# Patient Record
Sex: MALE | Race: WHITE | NOT HISPANIC OR LATINO | Employment: UNEMPLOYED | URBAN - METROPOLITAN AREA
[De-identification: names, ages, dates, MRNs, and addresses within clinical notes are randomized per-mention and may not be internally consistent; named-entity substitution may affect disease eponyms.]

---

## 2017-01-09 ENCOUNTER — RECORDS - HEALTHEAST (OUTPATIENT)
Dept: LAB | Facility: HOSPITAL | Age: 52
End: 2017-01-09

## 2017-01-09 ENCOUNTER — RECORDS - HEALTHEAST (OUTPATIENT)
Dept: ADMINISTRATIVE | Facility: OTHER | Age: 52
End: 2017-01-09

## 2017-01-16 LAB
ACHR BIND IGG+IGM SER IA-SCNC: 1.13 NMOL/L
ACHR MOD AB/ACHR TOTAL SFR SER: 82 %
IMMUNOLOGIST REVIEW: ABNORMAL
STRIA MUS AB TITR SER: ABNORMAL TITER

## 2017-01-28 ENCOUNTER — COMMUNICATION - HEALTHEAST (OUTPATIENT)
Dept: FAMILY MEDICINE | Facility: CLINIC | Age: 52
End: 2017-01-28

## 2017-01-28 DIAGNOSIS — E78.5 HYPERLIPIDEMIA: ICD-10-CM

## 2017-04-20 ENCOUNTER — COMMUNICATION - HEALTHEAST (OUTPATIENT)
Dept: FAMILY MEDICINE | Facility: CLINIC | Age: 52
End: 2017-04-20

## 2017-04-20 DIAGNOSIS — E11.9 TYPE 2 DIABETES MELLITUS (H): ICD-10-CM

## 2017-07-21 ENCOUNTER — COMMUNICATION - HEALTHEAST (OUTPATIENT)
Dept: FAMILY MEDICINE | Facility: CLINIC | Age: 52
End: 2017-07-21

## 2017-07-21 DIAGNOSIS — E78.5 HYPERLIPIDEMIA: ICD-10-CM

## 2017-07-22 ENCOUNTER — COMMUNICATION - HEALTHEAST (OUTPATIENT)
Dept: FAMILY MEDICINE | Facility: CLINIC | Age: 52
End: 2017-07-22

## 2017-07-22 DIAGNOSIS — E11.9 TYPE 2 DIABETES MELLITUS (H): ICD-10-CM

## 2017-08-19 ENCOUNTER — COMMUNICATION - HEALTHEAST (OUTPATIENT)
Dept: FAMILY MEDICINE | Facility: CLINIC | Age: 52
End: 2017-08-19

## 2017-08-19 DIAGNOSIS — E11.9 DIABETES (H): ICD-10-CM

## 2017-08-20 ENCOUNTER — COMMUNICATION - HEALTHEAST (OUTPATIENT)
Dept: FAMILY MEDICINE | Facility: CLINIC | Age: 52
End: 2017-08-20

## 2017-08-20 DIAGNOSIS — E11.9 TYPE 2 DIABETES MELLITUS (H): ICD-10-CM

## 2017-08-28 ENCOUNTER — RECORDS - HEALTHEAST (OUTPATIENT)
Dept: ADMINISTRATIVE | Facility: OTHER | Age: 52
End: 2017-08-28

## 2017-08-28 ENCOUNTER — OFFICE VISIT - HEALTHEAST (OUTPATIENT)
Dept: FAMILY MEDICINE | Facility: CLINIC | Age: 52
End: 2017-08-28

## 2017-08-28 DIAGNOSIS — E78.5 HYPERLIPIDEMIA: ICD-10-CM

## 2017-08-28 DIAGNOSIS — Z12.11 COLON CANCER SCREENING: ICD-10-CM

## 2017-08-28 DIAGNOSIS — E11.9 TYPE 2 DIABETES MELLITUS (H): ICD-10-CM

## 2017-08-28 DIAGNOSIS — G70.00 MYASTHENIA GRAVIS (H): ICD-10-CM

## 2017-08-28 LAB — HBA1C MFR BLD: 6.6 % (ref 3.5–6)

## 2017-08-28 ASSESSMENT — MIFFLIN-ST. JEOR: SCORE: 1885.19

## 2017-09-20 ENCOUNTER — COMMUNICATION - HEALTHEAST (OUTPATIENT)
Dept: FAMILY MEDICINE | Facility: CLINIC | Age: 52
End: 2017-09-20

## 2017-09-20 DIAGNOSIS — E11.9 DIABETES (H): ICD-10-CM

## 2017-10-18 ENCOUNTER — COMMUNICATION - HEALTHEAST (OUTPATIENT)
Dept: FAMILY MEDICINE | Facility: CLINIC | Age: 52
End: 2017-10-18

## 2017-10-18 DIAGNOSIS — E78.5 HYPERLIPIDEMIA: ICD-10-CM

## 2017-12-21 ENCOUNTER — COMMUNICATION - HEALTHEAST (OUTPATIENT)
Dept: FAMILY MEDICINE | Facility: CLINIC | Age: 52
End: 2017-12-21

## 2017-12-21 DIAGNOSIS — E11.9 TYPE 2 DIABETES MELLITUS (H): ICD-10-CM

## 2017-12-28 ENCOUNTER — COMMUNICATION - HEALTHEAST (OUTPATIENT)
Dept: FAMILY MEDICINE | Facility: CLINIC | Age: 52
End: 2017-12-28

## 2017-12-29 ENCOUNTER — COMMUNICATION - HEALTHEAST (OUTPATIENT)
Dept: FAMILY MEDICINE | Facility: CLINIC | Age: 52
End: 2017-12-29

## 2017-12-29 DIAGNOSIS — E11.9 TYPE 2 DIABETES MELLITUS (H): ICD-10-CM

## 2018-01-24 ENCOUNTER — COMMUNICATION - HEALTHEAST (OUTPATIENT)
Dept: FAMILY MEDICINE | Facility: CLINIC | Age: 53
End: 2018-01-24

## 2018-01-24 DIAGNOSIS — E11.9 TYPE 2 DIABETES MELLITUS (H): ICD-10-CM

## 2018-02-16 ENCOUNTER — COMMUNICATION - HEALTHEAST (OUTPATIENT)
Dept: FAMILY MEDICINE | Facility: CLINIC | Age: 53
End: 2018-02-16

## 2018-02-16 DIAGNOSIS — E11.9 DIABETES (H): ICD-10-CM

## 2018-03-05 ENCOUNTER — COMMUNICATION - HEALTHEAST (OUTPATIENT)
Dept: FAMILY MEDICINE | Facility: CLINIC | Age: 53
End: 2018-03-05

## 2018-03-05 ENCOUNTER — OFFICE VISIT - HEALTHEAST (OUTPATIENT)
Dept: FAMILY MEDICINE | Facility: CLINIC | Age: 53
End: 2018-03-05

## 2018-03-05 DIAGNOSIS — E11.9 TYPE 2 DIABETES MELLITUS (H): ICD-10-CM

## 2018-03-05 DIAGNOSIS — E78.00 PURE HYPERCHOLESTEROLEMIA: ICD-10-CM

## 2018-03-05 DIAGNOSIS — Z00.00 HEALTH CARE MAINTENANCE: ICD-10-CM

## 2018-03-05 DIAGNOSIS — G70.00 MYASTHENIA GRAVIS (H): ICD-10-CM

## 2018-03-05 LAB
ALT SERPL W P-5'-P-CCNC: 43 U/L (ref 0–45)
HBA1C MFR BLD: 6.5 % (ref 3.5–6)
LDLC SERPL CALC-MCNC: 34 MG/DL

## 2018-05-29 ENCOUNTER — RECORDS - HEALTHEAST (OUTPATIENT)
Dept: LAB | Facility: HOSPITAL | Age: 53
End: 2018-05-29

## 2018-05-29 LAB
ALBUMIN SERPL-MCNC: 3.6 G/DL (ref 3.5–5)
ALP SERPL-CCNC: 100 U/L (ref 45–120)
ALT SERPL W P-5'-P-CCNC: 46 U/L (ref 0–45)
ANION GAP SERPL CALCULATED.3IONS-SCNC: 9 MMOL/L (ref 5–18)
AST SERPL W P-5'-P-CCNC: 45 U/L (ref 0–40)
BASOPHILS # BLD AUTO: 0 THOU/UL (ref 0–0.2)
BASOPHILS NFR BLD AUTO: 0 % (ref 0–2)
BILIRUB SERPL-MCNC: 1.6 MG/DL (ref 0–1)
BUN SERPL-MCNC: 11 MG/DL (ref 8–22)
CALCIUM SERPL-MCNC: 9.3 MG/DL (ref 8.5–10.5)
CHLORIDE BLD-SCNC: 107 MMOL/L (ref 98–107)
CO2 SERPL-SCNC: 24 MMOL/L (ref 22–31)
CREAT SERPL-MCNC: 0.62 MG/DL (ref 0.7–1.3)
EOSINOPHIL # BLD AUTO: 0.1 THOU/UL (ref 0–0.4)
EOSINOPHIL NFR BLD AUTO: 2 % (ref 0–6)
ERYTHROCYTE [DISTWIDTH] IN BLOOD BY AUTOMATED COUNT: 13.7 % (ref 11–14.5)
GFR SERPL CREATININE-BSD FRML MDRD: >60 ML/MIN/1.73M2
GLUCOSE BLD-MCNC: 180 MG/DL (ref 70–125)
HCT VFR BLD AUTO: 38 % (ref 40–54)
HGB BLD-MCNC: 13.9 G/DL (ref 14–18)
LYMPHOCYTES # BLD AUTO: 0.6 THOU/UL (ref 0.8–4.4)
LYMPHOCYTES NFR BLD AUTO: 10 % (ref 20–40)
MCH RBC QN AUTO: 36 PG (ref 27–34)
MCHC RBC AUTO-ENTMCNC: 36.6 G/DL (ref 32–36)
MCV RBC AUTO: 98 FL (ref 80–100)
MONOCYTES # BLD AUTO: 0.2 THOU/UL (ref 0–0.9)
MONOCYTES NFR BLD AUTO: 4 % (ref 2–10)
NEUTROPHILS # BLD AUTO: 4.5 THOU/UL (ref 2–7.7)
NEUTROPHILS NFR BLD AUTO: 84 % (ref 50–70)
PLATELET # BLD AUTO: 173 THOU/UL (ref 140–440)
PMV BLD AUTO: 9.6 FL (ref 8.5–12.5)
POTASSIUM BLD-SCNC: 4.2 MMOL/L (ref 3.5–5)
PROT SERPL-MCNC: 7.2 G/DL (ref 6–8)
RBC # BLD AUTO: 3.86 MILL/UL (ref 4.4–6.2)
SODIUM SERPL-SCNC: 140 MMOL/L (ref 136–145)
WBC: 5.4 THOU/UL (ref 4–11)

## 2018-07-18 ENCOUNTER — COMMUNICATION - HEALTHEAST (OUTPATIENT)
Dept: FAMILY MEDICINE | Facility: CLINIC | Age: 53
End: 2018-07-18

## 2018-07-18 DIAGNOSIS — E78.5 HYPERLIPIDEMIA: ICD-10-CM

## 2018-11-27 ENCOUNTER — COMMUNICATION - HEALTHEAST (OUTPATIENT)
Dept: FAMILY MEDICINE | Facility: CLINIC | Age: 53
End: 2018-11-27

## 2018-11-27 ENCOUNTER — OFFICE VISIT - HEALTHEAST (OUTPATIENT)
Dept: FAMILY MEDICINE | Facility: CLINIC | Age: 53
End: 2018-11-27

## 2018-11-27 DIAGNOSIS — E66.811 CLASS 1 OBESITY WITHOUT SERIOUS COMORBIDITY IN ADULT, UNSPECIFIED BMI, UNSPECIFIED OBESITY TYPE: ICD-10-CM

## 2018-11-27 DIAGNOSIS — E11.9 TYPE 2 DIABETES MELLITUS WITHOUT COMPLICATION, WITHOUT LONG-TERM CURRENT USE OF INSULIN (H): ICD-10-CM

## 2018-11-27 DIAGNOSIS — Z00.00 HEALTHCARE MAINTENANCE: ICD-10-CM

## 2018-11-27 DIAGNOSIS — R21 RASH AND NONSPECIFIC SKIN ERUPTION: ICD-10-CM

## 2018-11-27 DIAGNOSIS — G70.00 MYASTHENIA GRAVIS (H): ICD-10-CM

## 2018-11-27 DIAGNOSIS — Z12.11 COLON CANCER SCREENING: ICD-10-CM

## 2018-11-27 DIAGNOSIS — E78.00 PURE HYPERCHOLESTEROLEMIA: ICD-10-CM

## 2018-11-27 LAB
CREAT UR-MCNC: 233.6 MG/DL
HBA1C MFR BLD: 5.6 % (ref 3.5–6)
MICROALBUMIN UR-MCNC: 1.24 MG/DL (ref 0–1.99)
MICROALBUMIN/CREAT UR: 5.3 MG/G

## 2018-12-17 ENCOUNTER — RECORDS - HEALTHEAST (OUTPATIENT)
Dept: LAB | Facility: HOSPITAL | Age: 53
End: 2018-12-17

## 2018-12-17 LAB
ALBUMIN SERPL-MCNC: 3.6 G/DL (ref 3.5–5)
ALP SERPL-CCNC: 96 U/L (ref 45–120)
ALT SERPL W P-5'-P-CCNC: 55 U/L (ref 0–45)
ANION GAP SERPL CALCULATED.3IONS-SCNC: 5 MMOL/L (ref 5–18)
AST SERPL W P-5'-P-CCNC: 52 U/L (ref 0–40)
BASOPHILS # BLD AUTO: 0 THOU/UL (ref 0–0.2)
BASOPHILS NFR BLD AUTO: 1 % (ref 0–2)
BILIRUB SERPL-MCNC: 0.9 MG/DL (ref 0–1)
BUN SERPL-MCNC: 13 MG/DL (ref 8–22)
CALCIUM SERPL-MCNC: 9.1 MG/DL (ref 8.5–10.5)
CHLORIDE BLD-SCNC: 107 MMOL/L (ref 98–107)
CO2 SERPL-SCNC: 26 MMOL/L (ref 22–31)
CREAT SERPL-MCNC: 0.73 MG/DL (ref 0.7–1.3)
EOSINOPHIL # BLD AUTO: 0.1 THOU/UL (ref 0–0.4)
EOSINOPHIL NFR BLD AUTO: 3 % (ref 0–6)
ERYTHROCYTE [DISTWIDTH] IN BLOOD BY AUTOMATED COUNT: 13.7 % (ref 11–14.5)
GFR SERPL CREATININE-BSD FRML MDRD: >60 ML/MIN/1.73M2
GLUCOSE BLD-MCNC: 174 MG/DL (ref 70–125)
HCT VFR BLD AUTO: 37.5 % (ref 40–54)
HGB BLD-MCNC: 13.5 G/DL (ref 14–18)
LYMPHOCYTES # BLD AUTO: 0.7 THOU/UL (ref 0.8–4.4)
LYMPHOCYTES NFR BLD AUTO: 16 % (ref 20–40)
MCH RBC QN AUTO: 35.2 PG (ref 27–34)
MCHC RBC AUTO-ENTMCNC: 36 G/DL (ref 32–36)
MCV RBC AUTO: 98 FL (ref 80–100)
MONOCYTES # BLD AUTO: 0.3 THOU/UL (ref 0–0.9)
MONOCYTES NFR BLD AUTO: 8 % (ref 2–10)
NEUTROPHILS # BLD AUTO: 2.9 THOU/UL (ref 2–7.7)
NEUTROPHILS NFR BLD AUTO: 72 % (ref 50–70)
PLATELET # BLD AUTO: 173 THOU/UL (ref 140–440)
PMV BLD AUTO: 9 FL (ref 8.5–12.5)
POTASSIUM BLD-SCNC: 4.3 MMOL/L (ref 3.5–5)
PROT SERPL-MCNC: 7.1 G/DL (ref 6–8)
RBC # BLD AUTO: 3.83 MILL/UL (ref 4.4–6.2)
SODIUM SERPL-SCNC: 138 MMOL/L (ref 136–145)
WBC: 4 THOU/UL (ref 4–11)

## 2019-01-02 ENCOUNTER — COMMUNICATION - HEALTHEAST (OUTPATIENT)
Dept: FAMILY MEDICINE | Facility: CLINIC | Age: 54
End: 2019-01-02

## 2019-01-07 ENCOUNTER — OFFICE VISIT - HEALTHEAST (OUTPATIENT)
Dept: FAMILY MEDICINE | Facility: CLINIC | Age: 54
End: 2019-01-07

## 2019-01-07 DIAGNOSIS — R20.2 NUMBNESS AND TINGLING: ICD-10-CM

## 2019-01-07 DIAGNOSIS — R20.0 NUMBNESS AND TINGLING: ICD-10-CM

## 2019-01-07 LAB
ALBUMIN SERPL-MCNC: 3.8 G/DL (ref 3.5–5)
ALP SERPL-CCNC: 99 U/L (ref 45–120)
ALT SERPL W P-5'-P-CCNC: 36 U/L (ref 0–45)
ANION GAP SERPL CALCULATED.3IONS-SCNC: 10 MMOL/L (ref 5–18)
AST SERPL W P-5'-P-CCNC: 35 U/L (ref 0–40)
BASOPHILS # BLD AUTO: 0 THOU/UL (ref 0–0.2)
BASOPHILS NFR BLD AUTO: 0 % (ref 0–2)
BILIRUB SERPL-MCNC: 0.9 MG/DL (ref 0–1)
BUN SERPL-MCNC: 19 MG/DL (ref 8–22)
CALCIUM SERPL-MCNC: 9.1 MG/DL (ref 8.5–10.5)
CHLORIDE BLD-SCNC: 105 MMOL/L (ref 98–107)
CK SERPL-CCNC: 101 U/L (ref 30–190)
CO2 SERPL-SCNC: 25 MMOL/L (ref 22–31)
CREAT SERPL-MCNC: 0.69 MG/DL (ref 0.7–1.3)
EOSINOPHIL # BLD AUTO: 0.1 THOU/UL (ref 0–0.4)
EOSINOPHIL NFR BLD AUTO: 2 % (ref 0–6)
ERYTHROCYTE [DISTWIDTH] IN BLOOD BY AUTOMATED COUNT: 12.2 % (ref 11–14.5)
FOLATE SERPL-MCNC: 6.5 NG/ML
GFR SERPL CREATININE-BSD FRML MDRD: >60 ML/MIN/1.73M2
GLUCOSE BLD-MCNC: 147 MG/DL (ref 70–125)
HCT VFR BLD AUTO: 40.6 % (ref 40–54)
HGB BLD-MCNC: 13.8 G/DL (ref 14–18)
LYMPHOCYTES # BLD AUTO: 0.5 THOU/UL (ref 0.8–4.4)
LYMPHOCYTES NFR BLD AUTO: 10 % (ref 20–40)
MCH RBC QN AUTO: 34.9 PG (ref 27–34)
MCHC RBC AUTO-ENTMCNC: 34 G/DL (ref 32–36)
MCV RBC AUTO: 102 FL (ref 80–100)
MONOCYTES # BLD AUTO: 0.2 THOU/UL (ref 0–0.9)
MONOCYTES NFR BLD AUTO: 5 % (ref 2–10)
NEUTROPHILS # BLD AUTO: 4 THOU/UL (ref 2–7.7)
NEUTROPHILS NFR BLD AUTO: 83 % (ref 50–70)
PLATELET # BLD AUTO: 150 THOU/UL (ref 140–440)
PMV BLD AUTO: 6.8 FL (ref 7–10)
POTASSIUM BLD-SCNC: 4.4 MMOL/L (ref 3.5–5)
PROT SERPL-MCNC: 7.1 G/DL (ref 6–8)
RBC # BLD AUTO: 3.96 MILL/UL (ref 4.4–6.2)
SODIUM SERPL-SCNC: 140 MMOL/L (ref 136–145)
TSH SERPL DL<=0.005 MIU/L-ACNC: 2.7 UIU/ML (ref 0.3–5)
VIT B12 SERPL-MCNC: 369 PG/ML (ref 213–816)
WBC: 4.9 THOU/UL (ref 4–11)

## 2019-01-07 ASSESSMENT — MIFFLIN-ST. JEOR: SCORE: 1805.35

## 2019-01-17 ENCOUNTER — COMMUNICATION - HEALTHEAST (OUTPATIENT)
Dept: FAMILY MEDICINE | Facility: CLINIC | Age: 54
End: 2019-01-17

## 2019-01-28 ENCOUNTER — HOSPITAL ENCOUNTER (OUTPATIENT)
Dept: MRI IMAGING | Facility: HOSPITAL | Age: 54
Discharge: HOME | End: 2019-01-28
Attending: FAMILY MEDICINE

## 2019-01-28 DIAGNOSIS — R20.0 NUMBNESS AND TINGLING: ICD-10-CM

## 2019-01-28 DIAGNOSIS — R20.2 NUMBNESS AND TINGLING: ICD-10-CM

## 2019-03-08 ENCOUNTER — COMMUNICATION - HEALTHEAST (OUTPATIENT)
Dept: FAMILY MEDICINE | Facility: CLINIC | Age: 54
End: 2019-03-08

## 2019-03-08 DIAGNOSIS — E11.9 TYPE 2 DIABETES MELLITUS (H): ICD-10-CM

## 2019-05-23 ENCOUNTER — COMMUNICATION - HEALTHEAST (OUTPATIENT)
Dept: FAMILY MEDICINE | Facility: CLINIC | Age: 54
End: 2019-05-23

## 2019-05-23 DIAGNOSIS — E11.9 TYPE 2 DIABETES MELLITUS (H): ICD-10-CM

## 2019-06-26 ENCOUNTER — COMMUNICATION - HEALTHEAST (OUTPATIENT)
Dept: FAMILY MEDICINE | Facility: CLINIC | Age: 54
End: 2019-06-26

## 2019-06-26 DIAGNOSIS — E78.00 PURE HYPERCHOLESTEROLEMIA: ICD-10-CM

## 2019-11-05 ENCOUNTER — OFFICE VISIT - HEALTHEAST (OUTPATIENT)
Dept: FAMILY MEDICINE | Facility: CLINIC | Age: 54
End: 2019-11-05

## 2019-11-05 DIAGNOSIS — G70.00 MYASTHENIA GRAVIS (H): ICD-10-CM

## 2019-11-05 DIAGNOSIS — R59.1 LYMPHADENOPATHY: ICD-10-CM

## 2019-11-05 DIAGNOSIS — E11.9 TYPE 2 DIABETES MELLITUS WITHOUT COMPLICATION, WITHOUT LONG-TERM CURRENT USE OF INSULIN (H): ICD-10-CM

## 2019-11-05 LAB
ALT SERPL W P-5'-P-CCNC: 39 U/L (ref 0–45)
ANION GAP SERPL CALCULATED.3IONS-SCNC: 5 MMOL/L (ref 5–18)
BASOPHILS # BLD AUTO: 0 THOU/UL (ref 0–0.2)
BASOPHILS NFR BLD AUTO: 0 % (ref 0–2)
BUN SERPL-MCNC: 15 MG/DL (ref 8–22)
CALCIUM SERPL-MCNC: 9.2 MG/DL (ref 8.5–10.5)
CHLORIDE BLD-SCNC: 108 MMOL/L (ref 98–107)
CO2 SERPL-SCNC: 29 MMOL/L (ref 22–31)
CREAT SERPL-MCNC: 0.68 MG/DL (ref 0.7–1.3)
CREAT UR-MCNC: 174.2 MG/DL
EOSINOPHIL # BLD AUTO: 0.1 THOU/UL (ref 0–0.4)
EOSINOPHIL NFR BLD AUTO: 2 % (ref 0–6)
ERYTHROCYTE [DISTWIDTH] IN BLOOD BY AUTOMATED COUNT: 15.4 % (ref 11–14.5)
GFR SERPL CREATININE-BSD FRML MDRD: >60 ML/MIN/1.73M2
GLUCOSE BLD-MCNC: 146 MG/DL (ref 70–125)
HBA1C MFR BLD: 6.1 % (ref 3.5–6)
HCT VFR BLD AUTO: 36.5 % (ref 40–54)
HGB BLD-MCNC: 12.5 G/DL (ref 14–18)
LDLC SERPL CALC-MCNC: 31 MG/DL
LYMPHOCYTES # BLD AUTO: 0.4 THOU/UL (ref 0.8–4.4)
LYMPHOCYTES NFR BLD AUTO: 10 % (ref 20–40)
MCH RBC QN AUTO: 36.9 PG (ref 27–34)
MCHC RBC AUTO-ENTMCNC: 34.2 G/DL (ref 32–36)
MCV RBC AUTO: 108 FL (ref 80–100)
MICROALBUMIN UR-MCNC: 0.8 MG/DL (ref 0–1.99)
MICROALBUMIN/CREAT UR: 4.6 MG/G
MONOCYTES # BLD AUTO: 0.2 THOU/UL (ref 0–0.9)
MONOCYTES NFR BLD AUTO: 4 % (ref 2–10)
NEUTROPHILS # BLD AUTO: 3.3 THOU/UL (ref 2–7.7)
NEUTROPHILS NFR BLD AUTO: 84 % (ref 50–70)
PLATELET # BLD AUTO: 140 THOU/UL (ref 140–440)
PMV BLD AUTO: 10 FL (ref 8.5–12.5)
POTASSIUM BLD-SCNC: 4.5 MMOL/L (ref 3.5–5)
RBC # BLD AUTO: 3.39 MILL/UL (ref 4.4–6.2)
SODIUM SERPL-SCNC: 142 MMOL/L (ref 136–145)
WBC: 3.9 THOU/UL (ref 4–11)

## 2019-11-05 ASSESSMENT — MIFFLIN-ST. JEOR: SCORE: 1783.81

## 2019-11-07 ENCOUNTER — COMMUNICATION - HEALTHEAST (OUTPATIENT)
Dept: FAMILY MEDICINE | Facility: CLINIC | Age: 54
End: 2019-11-07

## 2019-11-07 DIAGNOSIS — E11.9 TYPE 2 DIABETES MELLITUS (H): ICD-10-CM

## 2019-11-11 ENCOUNTER — HOSPITAL ENCOUNTER (OUTPATIENT)
Dept: CT IMAGING | Facility: HOSPITAL | Age: 54
Discharge: HOME | End: 2019-11-11
Attending: FAMILY MEDICINE

## 2019-11-11 DIAGNOSIS — R59.1 LYMPHADENOPATHY: ICD-10-CM

## 2019-11-12 ENCOUNTER — RECORDS - HEALTHEAST (OUTPATIENT)
Dept: ADMINISTRATIVE | Facility: OTHER | Age: 54
End: 2019-11-12

## 2019-11-21 ENCOUNTER — COMMUNICATION - HEALTHEAST (OUTPATIENT)
Dept: FAMILY MEDICINE | Facility: CLINIC | Age: 54
End: 2019-11-21

## 2019-11-22 ENCOUNTER — COMMUNICATION - HEALTHEAST (OUTPATIENT)
Dept: SCHEDULING | Facility: CLINIC | Age: 54
End: 2019-11-22

## 2019-11-22 ENCOUNTER — RECORDS - HEALTHEAST (OUTPATIENT)
Dept: ADMINISTRATIVE | Facility: OTHER | Age: 54
End: 2019-11-22

## 2019-11-26 ENCOUNTER — OFFICE VISIT - HEALTHEAST (OUTPATIENT)
Dept: FAMILY MEDICINE | Facility: CLINIC | Age: 54
End: 2019-11-26

## 2019-11-26 DIAGNOSIS — C14.0 MALIGNANT NEOPLASM OF PHARYNX (H): ICD-10-CM

## 2019-11-26 DIAGNOSIS — Z01.818 PREOP EXAMINATION: ICD-10-CM

## 2019-11-26 LAB
ATRIAL RATE - MUSE: 70 BPM
DIASTOLIC BLOOD PRESSURE - MUSE: NORMAL
INTERPRETATION ECG - MUSE: NORMAL
P AXIS - MUSE: 43 DEGREES
PR INTERVAL - MUSE: 152 MS
QRS DURATION - MUSE: 132 MS
QT - MUSE: 442 MS
QTC - MUSE: 477 MS
R AXIS - MUSE: -30 DEGREES
SYSTOLIC BLOOD PRESSURE - MUSE: NORMAL
T AXIS - MUSE: -2 DEGREES
VENTRICULAR RATE- MUSE: 70 BPM

## 2019-11-26 ASSESSMENT — MIFFLIN-ST. JEOR: SCORE: 1756.59

## 2019-11-27 ENCOUNTER — COMMUNICATION - HEALTHEAST (OUTPATIENT)
Dept: FAMILY MEDICINE | Facility: CLINIC | Age: 54
End: 2019-11-27

## 2019-11-27 DIAGNOSIS — E11.9 TYPE 2 DIABETES MELLITUS (H): ICD-10-CM

## 2019-12-02 ENCOUNTER — RECORDS - HEALTHEAST (OUTPATIENT)
Dept: ADMINISTRATIVE | Facility: OTHER | Age: 54
End: 2019-12-02

## 2019-12-02 LAB — RETINOPATHY: NEGATIVE

## 2019-12-04 ENCOUNTER — RECORDS - HEALTHEAST (OUTPATIENT)
Dept: HEALTH INFORMATION MANAGEMENT | Facility: CLINIC | Age: 54
End: 2019-12-04

## 2019-12-05 ENCOUNTER — HOSPITAL ENCOUNTER (OUTPATIENT)
Dept: PET IMAGING | Facility: HOSPITAL | Age: 54
Discharge: HOME | End: 2019-12-05
Attending: OTOLARYNGOLOGY

## 2019-12-05 DIAGNOSIS — E11.9 TYPE 2 DIABETES MELLITUS WITHOUT COMPLICATIONS (H): ICD-10-CM

## 2019-12-05 DIAGNOSIS — R22.1 LOCALIZED SWELLING, MASS AND LUMP, NECK: ICD-10-CM

## 2019-12-05 DIAGNOSIS — C77.0 SECONDARY AND UNSPECIFIED MALIGNANT NEOPLASM OF LYMPH NODES OF HEAD, FACE AND NECK (H): ICD-10-CM

## 2019-12-05 DIAGNOSIS — E66.09 OTHER OBESITY DUE TO EXCESS CALORIES: ICD-10-CM

## 2019-12-05 DIAGNOSIS — G70.00 MYASTHENIA GRAVIS (H): ICD-10-CM

## 2019-12-05 LAB — GLUCOSE BLDC GLUCOMTR-MCNC: 147 MG/DL (ref 70–139)

## 2019-12-10 ENCOUNTER — RECORDS - HEALTHEAST (OUTPATIENT)
Dept: ADMINISTRATIVE | Facility: OTHER | Age: 54
End: 2019-12-10

## 2019-12-11 ENCOUNTER — RECORDS - HEALTHEAST (OUTPATIENT)
Dept: ADMINISTRATIVE | Facility: OTHER | Age: 54
End: 2019-12-11

## 2019-12-13 ENCOUNTER — RECORDS - HEALTHEAST (OUTPATIENT)
Dept: ADMINISTRATIVE | Facility: OTHER | Age: 54
End: 2019-12-13

## 2019-12-23 ENCOUNTER — HOSPITAL ENCOUNTER (OUTPATIENT)
Dept: INTERVENTIONAL RADIOLOGY/VASCULAR | Facility: HOSPITAL | Age: 54
Discharge: HOME | End: 2019-12-23
Attending: INTERNAL MEDICINE | Admitting: RADIOLOGY

## 2019-12-23 DIAGNOSIS — C02.9 TONGUE CANCER (H): ICD-10-CM

## 2019-12-23 ASSESSMENT — MIFFLIN-ST. JEOR: SCORE: 1711.23

## 2019-12-30 ENCOUNTER — RECORDS - HEALTHEAST (OUTPATIENT)
Dept: ADMINISTRATIVE | Facility: OTHER | Age: 54
End: 2019-12-30

## 2019-12-30 ENCOUNTER — COMMUNICATION - HEALTHEAST (OUTPATIENT)
Dept: FAMILY MEDICINE | Facility: CLINIC | Age: 54
End: 2019-12-30

## 2019-12-30 DIAGNOSIS — E11.9 TYPE 2 DIABETES MELLITUS WITHOUT COMPLICATION, WITHOUT LONG-TERM CURRENT USE OF INSULIN (H): ICD-10-CM

## 2020-01-06 ENCOUNTER — RECORDS - HEALTHEAST (OUTPATIENT)
Dept: ADMINISTRATIVE | Facility: OTHER | Age: 55
End: 2020-01-06

## 2020-01-13 ENCOUNTER — RECORDS - HEALTHEAST (OUTPATIENT)
Dept: ADMINISTRATIVE | Facility: OTHER | Age: 55
End: 2020-01-13

## 2020-01-24 ENCOUNTER — AMBULATORY - HEALTHEAST (OUTPATIENT)
Dept: LAB | Facility: HOSPITAL | Age: 55
End: 2020-01-24

## 2020-01-27 ENCOUNTER — HOME INFUSION (PRE-WILLOW HOME INFUSION) (OUTPATIENT)
Dept: PHARMACY | Facility: CLINIC | Age: 55
End: 2020-01-27

## 2020-01-28 NOTE — PROGRESS NOTES
This is a recent snapshot of the patient's [facility] medical record.  For current drug dose and complete information and questions, call [phone] or In [facility] pool, fv home infusion ([id number])  CSN Number:  [id number]

## 2020-01-29 ENCOUNTER — COMMUNICATION - HEALTHEAST (OUTPATIENT)
Dept: CARE COORDINATION | Facility: CLINIC | Age: 55
End: 2020-01-29

## 2020-01-29 ENCOUNTER — AMBULATORY - HEALTHEAST (OUTPATIENT)
Dept: NURSING | Facility: CLINIC | Age: 55
End: 2020-01-29

## 2020-01-29 DIAGNOSIS — C14.0 MALIGNANT NEOPLASM OF PHARYNX (H): ICD-10-CM

## 2020-01-29 DIAGNOSIS — E11.9 TYPE 2 DIABETES MELLITUS WITHOUT COMPLICATION, WITHOUT LONG-TERM CURRENT USE OF INSULIN (H): ICD-10-CM

## 2020-01-29 DIAGNOSIS — G70.00 MYASTHENIA GRAVIS (H): ICD-10-CM

## 2020-01-30 ENCOUNTER — COMMUNICATION - HEALTHEAST (OUTPATIENT)
Dept: NURSING | Facility: CLINIC | Age: 55
End: 2020-01-30

## 2020-01-30 ENCOUNTER — AMBULATORY - HEALTHEAST (OUTPATIENT)
Dept: PHARMACY | Facility: CLINIC | Age: 55
End: 2020-01-30

## 2020-01-30 DIAGNOSIS — R13.12 OROPHARYNGEAL DYSPHAGIA: ICD-10-CM

## 2020-01-30 DIAGNOSIS — K21.9 GASTROESOPHAGEAL REFLUX DISEASE, ESOPHAGITIS PRESENCE NOT SPECIFIED: ICD-10-CM

## 2020-01-30 DIAGNOSIS — G70.00 MYASTHENIA GRAVIS (H): ICD-10-CM

## 2020-01-30 DIAGNOSIS — D69.6 THROMBOCYTOPENIA (H): ICD-10-CM

## 2020-01-30 DIAGNOSIS — T45.1X5A CHEMOTHERAPY-INDUCED NEUTROPENIA (H): ICD-10-CM

## 2020-01-30 DIAGNOSIS — E11.9 TYPE 2 DIABETES MELLITUS WITHOUT COMPLICATION, WITHOUT LONG-TERM CURRENT USE OF INSULIN (H): ICD-10-CM

## 2020-01-30 DIAGNOSIS — D70.1 CHEMOTHERAPY-INDUCED NEUTROPENIA (H): ICD-10-CM

## 2020-01-30 DIAGNOSIS — C14.0 MALIGNANT NEOPLASM OF PHARYNX (H): ICD-10-CM

## 2020-02-03 ENCOUNTER — RECORDS - HEALTHEAST (OUTPATIENT)
Dept: ADMINISTRATIVE | Facility: OTHER | Age: 55
End: 2020-02-03

## 2020-02-05 ENCOUNTER — RECORDS - HEALTHEAST (OUTPATIENT)
Dept: ADMINISTRATIVE | Facility: OTHER | Age: 55
End: 2020-02-05

## 2020-02-07 ENCOUNTER — OFFICE VISIT - HEALTHEAST (OUTPATIENT)
Dept: FAMILY MEDICINE | Facility: CLINIC | Age: 55
End: 2020-02-07

## 2020-02-07 ENCOUNTER — HOSPITAL ENCOUNTER (OUTPATIENT)
Dept: INTERVENTIONAL RADIOLOGY/VASCULAR | Facility: HOSPITAL | Age: 55
Discharge: HOME | End: 2020-02-07
Attending: FAMILY MEDICINE | Admitting: RADIOLOGY

## 2020-02-07 ENCOUNTER — COMMUNICATION - HEALTHEAST (OUTPATIENT)
Dept: FAMILY MEDICINE | Facility: CLINIC | Age: 55
End: 2020-02-07

## 2020-02-07 ENCOUNTER — HOSPITAL ENCOUNTER (OUTPATIENT)
Dept: INTERVENTIONAL RADIOLOGY/VASCULAR | Facility: HOSPITAL | Age: 55
Discharge: HOME | End: 2020-02-07
Attending: NURSE PRACTITIONER

## 2020-02-07 DIAGNOSIS — R13.12 OROPHARYNGEAL DYSPHAGIA: ICD-10-CM

## 2020-02-07 DIAGNOSIS — Z93.1 COMPLAINT ASSOCIATED WITH GASTRIC TUBE (H): ICD-10-CM

## 2020-02-07 DIAGNOSIS — R68.89 COMPLAINT ASSOCIATED WITH GASTRIC TUBE (H): ICD-10-CM

## 2020-02-07 DIAGNOSIS — E78.00 PURE HYPERCHOLESTEROLEMIA: ICD-10-CM

## 2020-02-07 ASSESSMENT — MIFFLIN-ST. JEOR: SCORE: 1627.32

## 2020-02-10 ENCOUNTER — RECORDS - HEALTHEAST (OUTPATIENT)
Dept: ADMINISTRATIVE | Facility: OTHER | Age: 55
End: 2020-02-10

## 2020-02-12 ENCOUNTER — RECORDS - HEALTHEAST (OUTPATIENT)
Dept: ADMINISTRATIVE | Facility: OTHER | Age: 55
End: 2020-02-12

## 2020-02-14 ENCOUNTER — OFFICE VISIT - HEALTHEAST (OUTPATIENT)
Dept: FAMILY MEDICINE | Facility: CLINIC | Age: 55
End: 2020-02-14

## 2020-02-14 DIAGNOSIS — G70.00 MYASTHENIA GRAVIS (H): ICD-10-CM

## 2020-02-14 DIAGNOSIS — K12.1 STOMATITIS AND MUCOSITIS: ICD-10-CM

## 2020-02-14 DIAGNOSIS — E11.9 TYPE 2 DIABETES MELLITUS WITHOUT COMPLICATION, WITHOUT LONG-TERM CURRENT USE OF INSULIN (H): ICD-10-CM

## 2020-02-14 DIAGNOSIS — K12.30 STOMATITIS AND MUCOSITIS: ICD-10-CM

## 2020-02-14 DIAGNOSIS — C14.0 MALIGNANT NEOPLASM OF PHARYNX (H): ICD-10-CM

## 2020-02-14 ASSESSMENT — MIFFLIN-ST. JEOR: SCORE: 1643.19

## 2020-02-18 ENCOUNTER — RECORDS - HEALTHEAST (OUTPATIENT)
Dept: ADMINISTRATIVE | Facility: OTHER | Age: 55
End: 2020-02-18

## 2020-02-19 ENCOUNTER — RECORDS - HEALTHEAST (OUTPATIENT)
Dept: ADMINISTRATIVE | Facility: OTHER | Age: 55
End: 2020-02-19

## 2020-02-21 ENCOUNTER — RECORDS - HEALTHEAST (OUTPATIENT)
Dept: ADMINISTRATIVE | Facility: OTHER | Age: 55
End: 2020-02-21

## 2020-02-28 ENCOUNTER — AMBULATORY - HEALTHEAST (OUTPATIENT)
Dept: INFUSION THERAPY | Facility: HOSPITAL | Age: 55
End: 2020-02-28

## 2020-03-02 ENCOUNTER — INFUSION - HEALTHEAST (OUTPATIENT)
Dept: INFUSION THERAPY | Facility: HOSPITAL | Age: 55
End: 2020-03-02

## 2020-03-02 DIAGNOSIS — C14.0 MALIGNANT NEOPLASM OF PHARYNX (H): ICD-10-CM

## 2020-03-02 DIAGNOSIS — D64.9 ANEMIA: ICD-10-CM

## 2020-03-02 LAB
ABO/RH(D): NORMAL
ANTIBODY SCREEN: NEGATIVE

## 2020-03-04 LAB
BLD PROD TYP BPU: NORMAL
BLD PROD TYP BPU: NORMAL
BLOOD TYPE: 1700
BLOOD TYPE: 1700
CODING SYSTEM: NORMAL
CODING SYSTEM: NORMAL
COMPONENT (HISTORICAL CONVERSION): NORMAL
COMPONENT (HISTORICAL CONVERSION): NORMAL
CROSSMATCH: NORMAL
CROSSMATCH: NORMAL
ISSUE DATE AND TIME: NORMAL
ISSUE DATE AND TIME: NORMAL
STATUS (HISTORICAL CONVERSION): NORMAL
STATUS (HISTORICAL CONVERSION): NORMAL
UNIT ABO/RH (HISTORICAL CONVERSION): NORMAL
UNIT ABO/RH (HISTORICAL CONVERSION): NORMAL
UNIT NUMBER: NORMAL
UNIT NUMBER: NORMAL

## 2020-03-06 ENCOUNTER — RECORDS - HEALTHEAST (OUTPATIENT)
Dept: ADMINISTRATIVE | Facility: OTHER | Age: 55
End: 2020-03-06

## 2020-03-09 ENCOUNTER — HOME INFUSION (PRE-WILLOW HOME INFUSION) (OUTPATIENT)
Dept: PHARMACY | Facility: CLINIC | Age: 55
End: 2020-03-09

## 2020-03-10 NOTE — PROGRESS NOTES
This is a recent snapshot of the patient's [facility] medical record.  For current drug dose and complete information and questions, call [phone] or In [facility] pool, fv home infusion ([id number])  CSN Number:  [id number]

## 2020-03-18 ENCOUNTER — AMBULATORY - HEALTHEAST (OUTPATIENT)
Dept: INFUSION THERAPY | Facility: HOSPITAL | Age: 55
End: 2020-03-18

## 2020-03-24 ENCOUNTER — INFUSION - HEALTHEAST (OUTPATIENT)
Dept: INFUSION THERAPY | Facility: HOSPITAL | Age: 55
End: 2020-03-24

## 2020-03-24 DIAGNOSIS — C14.0 MALIGNANT NEOPLASM OF PHARYNX (H): ICD-10-CM

## 2020-03-24 LAB
ABO/RH(D): NORMAL
ANTIBODY SCREEN: NEGATIVE

## 2020-03-25 LAB
BLD PROD TYP BPU: NORMAL
BLOOD TYPE: 1700
CODING SYSTEM: NORMAL
COMPONENT (HISTORICAL CONVERSION): NORMAL
CROSSMATCH: NORMAL
ISSUE DATE AND TIME: NORMAL
STATUS (HISTORICAL CONVERSION): NORMAL
UNIT ABO/RH (HISTORICAL CONVERSION): NORMAL
UNIT NUMBER: NORMAL

## 2020-05-06 ENCOUNTER — RECORDS - HEALTHEAST (OUTPATIENT)
Dept: ADMINISTRATIVE | Facility: OTHER | Age: 55
End: 2020-05-06

## 2020-05-20 ENCOUNTER — HOME INFUSION (PRE-WILLOW HOME INFUSION) (OUTPATIENT)
Dept: PHARMACY | Facility: CLINIC | Age: 55
End: 2020-05-20

## 2020-05-21 ENCOUNTER — RECORDS - HEALTHEAST (OUTPATIENT)
Dept: ADMINISTRATIVE | Facility: OTHER | Age: 55
End: 2020-05-21

## 2020-05-21 NOTE — PROGRESS NOTES
This is a recent snapshot of the patient's [facility] medical record.  For current drug dose and complete information and questions, call [phone] or In Basket pool, fv home infusion ([id number])  CSN Number:  [id number]

## 2020-05-26 ENCOUNTER — RECORDS - HEALTHEAST (OUTPATIENT)
Dept: ADMINISTRATIVE | Facility: OTHER | Age: 55
End: 2020-05-26

## 2020-06-21 ENCOUNTER — RECORDS - HEALTHEAST (OUTPATIENT)
Dept: ADMINISTRATIVE | Facility: OTHER | Age: 55
End: 2020-06-21

## 2020-06-23 ENCOUNTER — AMBULATORY - HEALTHEAST (OUTPATIENT)
Dept: INTERVENTIONAL RADIOLOGY/VASCULAR | Facility: HOSPITAL | Age: 55
End: 2020-06-23

## 2020-06-23 ENCOUNTER — RECORDS - HEALTHEAST (OUTPATIENT)
Dept: ADMINISTRATIVE | Facility: OTHER | Age: 55
End: 2020-06-23

## 2020-06-23 DIAGNOSIS — Z11.59 ENCOUNTER FOR SCREENING FOR OTHER VIRAL DISEASES: ICD-10-CM

## 2020-06-24 ENCOUNTER — AMBULATORY - HEALTHEAST (OUTPATIENT)
Dept: FAMILY MEDICINE | Facility: CLINIC | Age: 55
End: 2020-06-24

## 2020-06-24 DIAGNOSIS — Z11.59 ENCOUNTER FOR SCREENING FOR OTHER VIRAL DISEASES: ICD-10-CM

## 2020-06-25 ENCOUNTER — COMMUNICATION - HEALTHEAST (OUTPATIENT)
Dept: FAMILY MEDICINE | Facility: CLINIC | Age: 55
End: 2020-06-25

## 2020-06-26 ENCOUNTER — HOSPITAL ENCOUNTER (OUTPATIENT)
Dept: INTERVENTIONAL RADIOLOGY/VASCULAR | Facility: HOSPITAL | Age: 55
Discharge: HOME | End: 2020-06-26
Attending: RADIOLOGY

## 2020-06-26 DIAGNOSIS — C01 PRIMARY MALIGNANT NEOPLASM OF BASE OF TONGUE (H): ICD-10-CM

## 2020-07-09 ENCOUNTER — HOSPITAL ENCOUNTER (OUTPATIENT)
Dept: INTERVENTIONAL RADIOLOGY/VASCULAR | Facility: HOSPITAL | Age: 55
Discharge: HOME | End: 2020-07-09
Admitting: PHYSICIAN ASSISTANT

## 2020-07-09 DIAGNOSIS — E46 MALNUTRITION, UNSPECIFIED TYPE (H): ICD-10-CM

## 2020-08-12 ENCOUNTER — RECORDS - HEALTHEAST (OUTPATIENT)
Dept: ADMINISTRATIVE | Facility: OTHER | Age: 55
End: 2020-08-12

## 2020-08-12 ENCOUNTER — AMBULATORY - HEALTHEAST (OUTPATIENT)
Dept: INTERVENTIONAL RADIOLOGY/VASCULAR | Facility: HOSPITAL | Age: 55
End: 2020-08-12

## 2020-08-12 DIAGNOSIS — Z11.59 ENCOUNTER FOR SCREENING FOR OTHER VIRAL DISEASES: ICD-10-CM

## 2020-08-13 ENCOUNTER — AMBULATORY - HEALTHEAST (OUTPATIENT)
Dept: FAMILY MEDICINE | Facility: CLINIC | Age: 55
End: 2020-08-13

## 2020-08-13 ENCOUNTER — RECORDS - HEALTHEAST (OUTPATIENT)
Dept: ADMINISTRATIVE | Facility: OTHER | Age: 55
End: 2020-08-13

## 2020-08-13 DIAGNOSIS — Z11.59 ENCOUNTER FOR SCREENING FOR OTHER VIRAL DISEASES: ICD-10-CM

## 2020-08-14 ENCOUNTER — RECORDS - HEALTHEAST (OUTPATIENT)
Dept: ADMINISTRATIVE | Facility: OTHER | Age: 55
End: 2020-08-14

## 2020-08-15 ENCOUNTER — COMMUNICATION - HEALTHEAST (OUTPATIENT)
Dept: SCHEDULING | Facility: CLINIC | Age: 55
End: 2020-08-15

## 2020-08-17 ENCOUNTER — HOSPITAL ENCOUNTER (OUTPATIENT)
Dept: INTERVENTIONAL RADIOLOGY/VASCULAR | Facility: HOSPITAL | Age: 55
Discharge: HOME | End: 2020-08-17
Attending: INTERNAL MEDICINE | Admitting: RADIOLOGY

## 2020-08-17 DIAGNOSIS — C01 PRIMARY MALIGNANT NEOPLASM OF BASE OF TONGUE (H): ICD-10-CM

## 2020-08-17 DIAGNOSIS — C14.0 MALIGNANT NEOPLASM OF PHARYNX (H): ICD-10-CM

## 2020-09-02 ENCOUNTER — OFFICE VISIT - HEALTHEAST (OUTPATIENT)
Dept: FAMILY MEDICINE | Facility: CLINIC | Age: 55
End: 2020-09-02

## 2020-09-02 DIAGNOSIS — C14.0 MALIGNANT NEOPLASM OF PHARYNX (H): ICD-10-CM

## 2020-09-02 DIAGNOSIS — E11.9 TYPE 2 DIABETES MELLITUS WITHOUT COMPLICATION, WITHOUT LONG-TERM CURRENT USE OF INSULIN (H): ICD-10-CM

## 2020-09-02 DIAGNOSIS — G70.00 MYASTHENIA GRAVIS (H): ICD-10-CM

## 2020-09-02 LAB — HBA1C MFR BLD: 4.7 %

## 2020-09-02 ASSESSMENT — MIFFLIN-ST. JEOR: SCORE: 1561.54

## 2020-09-29 ENCOUNTER — RECORDS - HEALTHEAST (OUTPATIENT)
Dept: ADMINISTRATIVE | Facility: OTHER | Age: 55
End: 2020-09-29

## 2020-10-12 DIAGNOSIS — G70.00 MYASTHENIA GRAVIS (H): Primary | ICD-10-CM

## 2020-10-12 PROBLEM — R25.1 PHYSIOLOGICAL TREMOR: Status: ACTIVE | Noted: 2020-10-12

## 2020-10-12 PROBLEM — R13.10 DYSPHAGIA: Status: ACTIVE | Noted: 2020-01-18

## 2020-10-12 PROBLEM — E86.0 DEHYDRATION: Status: ACTIVE | Noted: 2020-01-18

## 2020-10-12 PROBLEM — R21 RASH AND NONSPECIFIC SKIN ERUPTION: Status: ACTIVE | Noted: 2018-11-27

## 2020-10-12 PROBLEM — E83.42 HYPOMAGNESEMIA: Status: ACTIVE | Noted: 2020-01-25

## 2020-10-12 PROBLEM — G25.0 ESSENTIAL TREMOR: Status: ACTIVE | Noted: 2020-10-12

## 2020-10-12 RX ORDER — PYRIDOSTIGMINE BROMIDE 60 MG/1
60 TABLET ORAL DAILY
COMMUNITY
Start: 2020-02-27 | End: 2020-10-12

## 2020-10-12 NOTE — TELEPHONE ENCOUNTER
Medication T'd for review and signature  Overdue for 6 month follow up   No future appt scheduled   Dominic Mcdonald CMA on 10/12/2020 at 1:12 PM

## 2020-10-13 RX ORDER — PYRIDOSTIGMINE BROMIDE 60 MG/1
60 TABLET ORAL DAILY
Qty: 60 TABLET | Refills: 1 | Status: SHIPPED | OUTPATIENT
Start: 2020-10-13 | End: 2021-01-19

## 2020-11-06 ENCOUNTER — COMMUNICATION - HEALTHEAST (OUTPATIENT)
Dept: FAMILY MEDICINE | Facility: CLINIC | Age: 55
End: 2020-11-06

## 2020-11-06 DIAGNOSIS — E78.00 PURE HYPERCHOLESTEROLEMIA: ICD-10-CM

## 2020-11-06 DIAGNOSIS — E11.9 TYPE 2 DIABETES MELLITUS (H): ICD-10-CM

## 2020-11-13 ENCOUNTER — RECORDS - HEALTHEAST (OUTPATIENT)
Dept: ADMINISTRATIVE | Facility: OTHER | Age: 55
End: 2020-11-13

## 2020-12-14 ENCOUNTER — RECORDS - HEALTHEAST (OUTPATIENT)
Dept: ADMINISTRATIVE | Facility: OTHER | Age: 55
End: 2020-12-14

## 2020-12-14 LAB — RETINOPATHY: NEGATIVE

## 2020-12-16 ENCOUNTER — RECORDS - HEALTHEAST (OUTPATIENT)
Dept: HEALTH INFORMATION MANAGEMENT | Facility: CLINIC | Age: 55
End: 2020-12-16

## 2021-01-19 ENCOUNTER — VIRTUAL VISIT (OUTPATIENT)
Dept: NEUROLOGY | Facility: CLINIC | Age: 56
End: 2021-01-19
Payer: COMMERCIAL

## 2021-01-19 ENCOUNTER — RECORDS - HEALTHEAST (OUTPATIENT)
Dept: ADMINISTRATIVE | Facility: OTHER | Age: 56
End: 2021-01-19

## 2021-01-19 VITALS — BODY MASS INDEX: 26.07 KG/M2 | HEIGHT: 68 IN | WEIGHT: 172 LBS

## 2021-01-19 DIAGNOSIS — G70.00 MYASTHENIA GRAVIS (H): ICD-10-CM

## 2021-01-19 PROCEDURE — 99213 OFFICE O/P EST LOW 20 MIN: CPT | Mod: GT | Performed by: PSYCHIATRY & NEUROLOGY

## 2021-01-19 RX ORDER — PYRIDOSTIGMINE BROMIDE 60 MG/1
60 TABLET ORAL 2 TIMES DAILY
Qty: 180 TABLET | Refills: 2 | Status: SHIPPED | OUTPATIENT
Start: 2021-01-19 | End: 2021-03-01

## 2021-01-19 RX ORDER — GLIPIZIDE 2.5 MG/1
2.5 TABLET, EXTENDED RELEASE ORAL DAILY
COMMUNITY
Start: 2019-11-07 | End: 2022-01-11

## 2021-01-19 RX ORDER — ATORVASTATIN CALCIUM 10 MG/1
1 TABLET, FILM COATED ORAL AT BEDTIME
COMMUNITY
Start: 2019-11-09 | End: 2022-04-14

## 2021-01-19 ASSESSMENT — MIFFLIN-ST. JEOR: SCORE: 1584.69

## 2021-01-19 NOTE — PROGRESS NOTES
[facility]  [place]    Cj Morales MRN# [id number]   Age: 55 year old YOB: 1965               Assessment and Plan:   Assessment:   Myasthenia  Essential tremor        Plan:     I renewed the pyridostigmine prescription for 2 tablets/day.  This way, Cj has the flexibility to go up to twice a day if needed depending on symptoms.  If he needs to go higher than that he can give us a call for further increase in the pyridostigmine prescription.  If we are running into more symptoms then we will need to talk about other immunomodulatory therapy (in lieu of azathioprine).  With his significant weight loss with the cancer he no longer has hyperglycemia.  I will plan to see him back again in 6 months.             Chief Complaint/HPI:     I saw Cj for a video visit today.  He is doing well.  He is finished with chemotherapy, radiation and surgery for the throat cancer.  He is doing well, his voice is good.  He is swallowing fine.  When he first gets up in the morning he might feel a little bit of pressure in his eyes, but otherwise is not having the myasthenia symptoms.  That pressure sensation goes away once he gets moving.  He still takes a Mestinon tablet in the evening before bed as part of his routine.  He has been off of chemotherapy since February 2020.  His diabetes medications are being cut back as his blood sugars have been under much better control since he is lost weight (due to the cancer and treatment).            Past Medical History:    has a past medical history of Diabetes (H) and MG (myasthenia gravis) (H).          Past Surgical History:    has no past surgical history on file.          Social History:     Social History     Tobacco Use     Smoking status: Never Smoker     Smokeless tobacco: Never Used   Substance Use Topics     Alcohol use: Yes     Comment: rare             Family History:     Family History   Problem Relation Age of Onset      Dementia Mother      Hypertension Mother      Tremor Mother      Cancer Father      Diabetes Father      Myocardial Infarction Father      Hyperlipidemia Father      Sleep Apnea Father                 Allergies:     Allergies   Allergen Reactions     Penicillins Hives and Rash     Pen V K                Medications:     Current Outpatient Medications:      atorvastatin (LIPITOR) 40 MG tablet, Take 1 tablet by mouth At Bedtime, Disp: , Rfl:      glipiZIDE (GLUCOTROL XL) 2.5 MG 24 hr tablet, Take 2.5 mg by mouth daily, Disp: , Rfl:      metFORMIN (GLUCOPHAGE) 500 MG tablet, Take 500 mg by mouth 2 times daily (with meals), Disp: , Rfl:      pyridostigmine (MESTINON) 60 MG tablet, Take 1 tablet (60 mg) by mouth daily, Disp: 60 tablet, Rfl: 1                Physical Exam:   Awake and alert with no aphasia no dysarthria  Speech is clear and coherent today  Cranial nerves are fine, no ptosis, no diplopia  I do not see any focal or lateralized weakness or coordination difficulties in the arms  There is very slight tremor in the right hand with posture and action       Video-Visit Details    Type of service:  Video Visit    Video Start Time: 10:33 AM  Video End Time: 10:45 AM    Originating Location (pt. Location): Home  Distant Location (provider location):  [facility]UR[facility] [place]   Platform used for Video Visit: Peyton Stephens MD

## 2021-01-19 NOTE — LETTER
1/19/2021         RE: Cj Morales  [address] [place] [address]        Dear Colleague,    Thank you for referring your patient, Cj Morales, to the [facility] [place]. Please see a copy of my visit note below.    [facility]  [place]    Cj Morales MRN# [id number]   Age: 55 year old YOB: 1965               Assessment and Plan:   Assessment:   Myasthenia  Essential tremor        Plan:     I renewed the pyridostigmine prescription for 2 tablets/day.  This way, Cj has the flexibility to go up to twice a day if needed depending on symptoms.  If he needs to go higher than that he can give us a call for further increase in the pyridostigmine prescription.  If we are running into more symptoms then we will need to talk about other immunomodulatory therapy (in lieu of azathioprine).  With his significant weight loss with the cancer he no longer has hyperglycemia.  I will plan to see him back again in 6 months.             Chief Complaint/HPI:     I saw Cj for a video visit today.  He is doing well.  He is finished with chemotherapy, radiation and surgery for the throat cancer.  He is doing well, his voice is good.  He is swallowing fine.  When he first gets up in the morning he might feel a little bit of pressure in his eyes, but otherwise is not having the myasthenia symptoms.  That pressure sensation goes away once he gets moving.  He still takes a Mestinon tablet in the evening before bed as part of his routine.  He has been off of chemotherapy since February 2020.  His diabetes medications are being cut back as his blood sugars have been under much better control since he is lost weight (due to the cancer and treatment).            Past Medical History:    has a past medical history of Diabetes (H) and MG (myasthenia gravis) (H).          Past Surgical History:    has no past surgical history on  file.          Social History:     Social History     Tobacco Use     Smoking status: Never Smoker     Smokeless tobacco: Never Used   Substance Use Topics     Alcohol use: Yes     Comment: rare             Family History:     Family History   Problem Relation Age of Onset     Dementia Mother      Hypertension Mother      Tremor Mother      Cancer Father      Diabetes Father      Myocardial Infarction Father      Hyperlipidemia Father      Sleep Apnea Father                 Allergies:     Allergies   Allergen Reactions     Penicillins Hives and Rash     Pen V K                Medications:     Current Outpatient Medications:      atorvastatin (LIPITOR) 40 MG tablet, Take 1 tablet by mouth At Bedtime, Disp: , Rfl:      glipiZIDE (GLUCOTROL XL) 2.5 MG 24 hr tablet, Take 2.5 mg by mouth daily, Disp: , Rfl:      metFORMIN (GLUCOPHAGE) 500 MG tablet, Take 500 mg by mouth 2 times daily (with meals), Disp: , Rfl:      pyridostigmine (MESTINON) 60 MG tablet, Take 1 tablet (60 mg) by mouth daily, Disp: 60 tablet, Rfl: 1                Physical Exam:   Awake and alert with no aphasia no dysarthria  Speech is clear and coherent today  Cranial nerves are fine, no ptosis, no diplopia  I do not see any focal or lateralized weakness or coordination difficulties in the arms  There is very slight tremor in the right hand with posture and action       Video-Visit Details    Type of service:  Video Visit    Video Start Time: 10:33 AM  Video End Time: 10:45 AM    Originating Location (pt. Location): Home  Distant Location (provider location):  [facility] NEUROLOGY [place]   Platform used for Video Visit: [facility]           Vinod Stephens MD             Again, thank you for allowing me to participate in the care of your patient.        Sincerely,        Vinod Stephens MD

## 2021-01-19 NOTE — NURSING NOTE
Chief Complaint   Patient presents with     Follow Up     6 month follow up-[facility] has been doing well since last visit.      Video visit Brandi Mcdonald CMA on 1/19/2021 at 10:26 AM

## 2021-01-24 ENCOUNTER — HEALTH MAINTENANCE LETTER (OUTPATIENT)
Age: 56
End: 2021-01-24

## 2021-02-04 ENCOUNTER — RECORDS - HEALTHEAST (OUTPATIENT)
Dept: ADMINISTRATIVE | Facility: OTHER | Age: 56
End: 2021-02-04

## 2021-02-09 ENCOUNTER — RECORDS - HEALTHEAST (OUTPATIENT)
Dept: ADMINISTRATIVE | Facility: OTHER | Age: 56
End: 2021-02-09

## 2021-02-09 ENCOUNTER — TRANSFERRED RECORDS (OUTPATIENT)
Dept: HEALTH INFORMATION MANAGEMENT | Facility: CLINIC | Age: 56
End: 2021-02-09

## 2021-02-27 ENCOUNTER — COMMUNICATION - HEALTHEAST (OUTPATIENT)
Dept: FAMILY MEDICINE | Facility: CLINIC | Age: 56
End: 2021-02-27

## 2021-02-27 DIAGNOSIS — E11.9 TYPE 2 DIABETES MELLITUS WITHOUT COMPLICATION, WITHOUT LONG-TERM CURRENT USE OF INSULIN (H): ICD-10-CM

## 2021-03-01 DIAGNOSIS — G70.00 MYASTHENIA GRAVIS (H): ICD-10-CM

## 2021-03-01 RX ORDER — PYRIDOSTIGMINE BROMIDE 60 MG/1
60 TABLET ORAL 2 TIMES DAILY
Qty: 180 TABLET | Refills: 0 | Status: SHIPPED | OUTPATIENT
Start: 2021-03-01 | End: 2022-02-18

## 2021-04-15 ENCOUNTER — OFFICE VISIT - HEALTHEAST (OUTPATIENT)
Dept: FAMILY MEDICINE | Facility: CLINIC | Age: 56
End: 2021-04-15

## 2021-04-15 DIAGNOSIS — E78.00 PURE HYPERCHOLESTEROLEMIA: ICD-10-CM

## 2021-04-15 DIAGNOSIS — R25.2 MUSCLE CRAMPS: ICD-10-CM

## 2021-04-15 DIAGNOSIS — E11.9 TYPE 2 DIABETES MELLITUS WITHOUT COMPLICATION, WITHOUT LONG-TERM CURRENT USE OF INSULIN (H): ICD-10-CM

## 2021-04-15 DIAGNOSIS — Z00.00 HEALTHCARE MAINTENANCE: ICD-10-CM

## 2021-04-15 LAB
ALT SERPL W P-5'-P-CCNC: 33 U/L (ref 0–45)
ANION GAP SERPL CALCULATED.3IONS-SCNC: 12 MMOL/L (ref 5–18)
BUN SERPL-MCNC: 22 MG/DL (ref 8–22)
CALCIUM SERPL-MCNC: 8.9 MG/DL (ref 8.5–10.5)
CHLORIDE BLD-SCNC: 104 MMOL/L (ref 98–107)
CHOLEST SERPL-MCNC: 101 MG/DL
CK SERPL-CCNC: 90 U/L (ref 30–190)
CO2 SERPL-SCNC: 24 MMOL/L (ref 22–31)
CREAT SERPL-MCNC: 0.83 MG/DL (ref 0.7–1.3)
CREAT UR-MCNC: 93.7 MG/DL
FASTING STATUS PATIENT QL REPORTED: YES
GFR SERPL CREATININE-BSD FRML MDRD: >60 ML/MIN/1.73M2
GLUCOSE BLD-MCNC: 101 MG/DL (ref 70–125)
HBA1C MFR BLD: 4.8 %
HDLC SERPL-MCNC: 27 MG/DL
HIV 1+2 AB+HIV1 P24 AG SERPL QL IA: NEGATIVE
LDLC SERPL CALC-MCNC: 50 MG/DL
MAGNESIUM SERPL-MCNC: 1.8 MG/DL (ref 1.8–2.6)
MICROALBUMIN UR-MCNC: <0.5 MG/DL (ref 0–1.99)
MICROALBUMIN/CREAT UR: NORMAL MG/G{CREAT}
POTASSIUM BLD-SCNC: 4.3 MMOL/L (ref 3.5–5)
SODIUM SERPL-SCNC: 140 MMOL/L (ref 136–145)
TRIGL SERPL-MCNC: 118 MG/DL

## 2021-04-15 ASSESSMENT — MIFFLIN-ST. JEOR: SCORE: 1652.26

## 2021-04-16 ENCOUNTER — COMMUNICATION - HEALTHEAST (OUTPATIENT)
Dept: FAMILY MEDICINE | Facility: CLINIC | Age: 56
End: 2021-04-16

## 2021-04-16 ENCOUNTER — RECORDS - HEALTHEAST (OUTPATIENT)
Dept: ADMINISTRATIVE | Facility: OTHER | Age: 56
End: 2021-04-16

## 2021-04-16 LAB — HCV AB SERPL QL IA: NEGATIVE

## 2021-05-16 ENCOUNTER — HEALTH MAINTENANCE LETTER (OUTPATIENT)
Age: 56
End: 2021-05-16

## 2021-05-27 VITALS
RESPIRATION RATE: 18 BRPM | HEART RATE: 80 BPM | TEMPERATURE: 98 F | SYSTOLIC BLOOD PRESSURE: 107 MMHG | OXYGEN SATURATION: 100 % | DIASTOLIC BLOOD PRESSURE: 59 MMHG

## 2021-05-27 VITALS
OXYGEN SATURATION: 98 % | DIASTOLIC BLOOD PRESSURE: 58 MMHG | TEMPERATURE: 98.2 F | SYSTOLIC BLOOD PRESSURE: 102 MMHG | RESPIRATION RATE: 18 BRPM | HEART RATE: 69 BPM

## 2021-05-29 NOTE — TELEPHONE ENCOUNTER
Refill Approved    Rx renewed per Medication Renewal Policy. Medication was last renewed on 3/5/18.    Sandy Rose, [facility] Connection Triage/Med Refill 5/24/2019     Requested Prescriptions   Pending Prescriptions Disp Refills     glipiZIDE (GLUCOTROL XL) 2.5 MG 24 hr tablet [Pharmacy Med Name: GLIPIZIDE ER 2.5 MG TABLET] 90 tablet 3     Sig: TAKE 1 TABLET (2.5 MG TOTAL) BY MOUTH DAILY.       Oral Hypoglycemics Refill Protocol Passed - 5/23/2019  1:28 AM        Passed - Visit with PCP or prescribing provider visit in last 6 months       Last office visit with prescriber/PCP: 11/27/2018 OR same dept: 1/7/2019 Silvia Lino DO OR same specialty: 1/7/2019 Silvia Lnio DO Last physical: Visit date not found Last MTM visit: Visit date not found         Next appt within 3 mo: Visit date not found  Next physical within 3 mo: Visit date not found  Prescriber OR PCP: Israel Quiroz MD  Last diagnosis associated with med order: 1. Type 2 diabetes mellitus (H)  - glipiZIDE (GLUCOTROL XL) 2.5 MG 24 hr tablet [Pharmacy Med Name: GLIPIZIDE ER 2.5 MG TABLET]; Take 1 tablet (2.5 mg total) by mouth daily.  Dispense: 90 tablet; Refill: 3     If protocol passes may refill for 12 months if within 3 months of last provider visit (or a total of 15 months).           Passed - A1C in last 6 months     Hemoglobin A1c   Date Value Ref Range Status   11/27/2018 5.6 3.5 - 6.0 % Final               Passed - Microalbumin in last year      Microalbumin, Random Urine   Date Value Ref Range Status   11/27/2018 1.24 0.00 - 1.99 mg/dL Final                  Passed - Blood pressure in last year     BP Readings from Last 1 Encounters:   01/07/19 134/66             Passed - Serum creatinine in last year     Creatinine   Date Value Ref Range Status   01/07/2019 0.69 (L) 0.70 - 1.30 mg/dL Final

## 2021-05-30 NOTE — TELEPHONE ENCOUNTER
Refill Approved    Rx renewed per Medication Renewal Policy. Medication was last renewed on 11/27/18.    Sandy Rose, [facility] Connection Triage/Med Refill 6/27/2019     Requested Prescriptions   Pending Prescriptions Disp Refills     atorvastatin (LIPITOR) 40 MG tablet [Pharmacy Med Name: ATORVASTATIN 40 MG TABLET] 90 tablet 2     Sig: TAKE 1 TABLET BY MOUTH EVERYDAY AT BEDTIME       Statins Refill Protocol (Hmg CoA Reductase Inhibitors) Passed - 6/26/2019  2:33 AM        Passed - PCP or prescribing provider visit in past 12 months      Last office visit with prescriber/PCP: 11/27/2018 Israel Quiroz MD OR same dept: 1/7/2019 Silvia Lino DO OR same specialty: 1/7/2019 Silvia Lino DO  Last physical: Visit date not found Last MTM visit: Visit date not found   Next visit within 3 mo: Visit date not found  Next physical within 3 mo: Visit date not found  Prescriber OR PCP: Israel Quiroz MD  Last diagnosis associated with med order: There are no diagnoses linked to this encounter.  If protocol passes may refill for 12 months if within 3 months of last provider visit (or a total of 15 months).

## 2021-05-31 VITALS — HEIGHT: 68 IN | BODY MASS INDEX: 36.37 KG/M2 | WEIGHT: 240 LBS

## 2021-05-31 VITALS — BODY MASS INDEX: 35.95 KG/M2 | WEIGHT: 233 LBS

## 2021-06-01 ENCOUNTER — RECORDS - HEALTHEAST (OUTPATIENT)
Dept: ADMINISTRATIVE | Facility: OTHER | Age: 56
End: 2021-06-01

## 2021-06-02 VITALS — BODY MASS INDEX: 34.45 KG/M2 | WEIGHT: 223.25 LBS

## 2021-06-02 VITALS — HEIGHT: 68 IN | WEIGHT: 223.5 LBS | BODY MASS INDEX: 33.87 KG/M2

## 2021-06-03 VITALS
HEART RATE: 72 BPM | SYSTOLIC BLOOD PRESSURE: 119 MMHG | HEIGHT: 68 IN | WEIGHT: 217 LBS | DIASTOLIC BLOOD PRESSURE: 66 MMHG | RESPIRATION RATE: 18 BRPM | BODY MASS INDEX: 32.89 KG/M2 | TEMPERATURE: 98.3 F

## 2021-06-03 VITALS — WEIGHT: 201 LBS | BODY MASS INDEX: 30.46 KG/M2 | HEIGHT: 68 IN

## 2021-06-03 VITALS
RESPIRATION RATE: 17 BRPM | OXYGEN SATURATION: 98 % | BODY MASS INDEX: 31.98 KG/M2 | HEART RATE: 76 BPM | DIASTOLIC BLOOD PRESSURE: 64 MMHG | WEIGHT: 211 LBS | HEIGHT: 68 IN | TEMPERATURE: 98.5 F | SYSTOLIC BLOOD PRESSURE: 112 MMHG

## 2021-06-03 VITALS — WEIGHT: 209 LBS | BODY MASS INDEX: 31.78 KG/M2

## 2021-06-03 NOTE — TELEPHONE ENCOUNTER
Refill Approved    Rx renewed per Medication Renewal Policy. Medication was last renewed on 3/2/16.    Marcelle Haynes, [facility] Connection Triage/Med Refill 11/28/2019     Requested Prescriptions   Pending Prescriptions Disp Refills     metFORMIN (GLUCOPHAGE) 500 MG tablet [Pharmacy Med Name: METFORMIN  MG TABLET] 360 tablet 2     Sig: TAKE 2 TABLETS (1,000 MG TOTAL) BY MOUTH 2 (TWO) TIMES A DAY WITH MEALS       Metformin Refill Protocol Passed - 11/27/2019  6:27 AM        Passed - Blood pressure in last 12 months     BP Readings from Last 1 Encounters:   11/26/19 112/64             Passed - LFT or AST or ALT in last 12 months     Albumin   Date Value Ref Range Status   01/07/2019 3.8 3.5 - 5.0 g/dL Final     Bilirubin, Total   Date Value Ref Range Status   01/07/2019 0.9 0.0 - 1.0 mg/dL Final     Alkaline Phosphatase   Date Value Ref Range Status   01/07/2019 99 45 - 120 U/L Final     AST   Date Value Ref Range Status   01/07/2019 35 0 - 40 U/L Final     ALT   Date Value Ref Range Status   11/05/2019 39 0 - 45 U/L Final     Protein, Total   Date Value Ref Range Status   01/07/2019 7.1 6.0 - 8.0 g/dL Final                Passed - GFR or Serum Creatinine in last 6 months     GFR MDRD Non Af Amer   Date Value Ref Range Status   11/05/2019 >60 >60 mL/min/1.73m2 Final     GFR MDRD Af Amer   Date Value Ref Range Status   11/05/2019 >60 >60 mL/min/1.73m2 Final             Passed - Visit with PCP or prescribing provider visit in last 6 months or next 3 months     Last office visit with prescriber/PCP: 11/26/2019 OR same dept: 11/26/2019 Israel Quiroz MD OR same specialty: 11/26/2019 Israel Quiroz MD Last physical: Visit date not found Last MTM visit: Visit date not found         Next appt within 3 mo: Visit date not found  Next physical within 3 mo: Visit date not found  Prescriber OR PCP: Israel Quiroz MD  Last diagnosis associated with med order: 1. Type 2 diabetes mellitus (H)  - metFORMIN  (GLUCOPHAGE) 500 MG tablet [Pharmacy Med Name: METFORMIN  MG TABLET]; TAKE 2 TABLETS (1,000 MG TOTAL) BY MOUTH 2 (TWO) TIMES A DAY WITH MEALS  Dispense: 360 tablet; Refill: 2     If protocol passes may refill for 12 months if within 3 months of last provider visit (or a total of 15 months).           Passed - A1C in last 6 months     Hemoglobin A1c   Date Value Ref Range Status   11/05/2019 6.1 (H) 3.5 - 6.0 % Final               Passed - Microalbumin in last year      Microalbumin, Random Urine   Date Value Ref Range Status   11/05/2019 0.80 0.00 - 1.99 mg/dL Final

## 2021-06-03 NOTE — TELEPHONE ENCOUNTER
Finally got a hold of pt. Pt will be coming in 11/26 for pre-op with you. Will discuss CT results at visit.

## 2021-06-03 NOTE — TELEPHONE ENCOUNTER
"Called pt, unable to leave voicemail due to being full, will try again later #2  \" Okay to relay message\"  "

## 2021-06-03 NOTE — TELEPHONE ENCOUNTER
"Called pt, unable to leave voicemail due to being full, will try again later #1 There is another phone message regarding CT results. Please relay that message too up return call.   \" Okay to relay message\"  "

## 2021-06-03 NOTE — TELEPHONE ENCOUNTER
"Called pt, unable to leave voicemail due to being full, will try again later #1  \" Okay to relay message\"      ----- Message from Israel Quiroz MD sent at 11/20/2019  8:13 AM CST -----  This CT scan has some concerning results-which we expected    I tried a number of times to get a hold of Albert last week.  I left a couple of messages, called both his home and cell phone.    Still I have not talked with him    Can you try to arrange a time for him and me to talk?    "

## 2021-06-03 NOTE — TELEPHONE ENCOUNTER
New Appointment Needed  What is the reason for the visit:    Pre-Op Appt Request  When is the surgery? :  11/27  Where is the surgery?:   N/A  Who is the surgeon? :  Dr. Bocanegra  What type of surgery is being done?: Unsure contact patient to clarify.  Provider Preference: PCP only  How soon do you need to be seen?: next week  Waitlist offered?: No  Okay to leave a detailed message:  Yes    Please call patient with availability as soon as able.

## 2021-06-03 NOTE — PROGRESS NOTES
Assessment/ Plan  Problem List Items Addressed This Visit        Unprioritized    Type 2 diabetes mellitus (H) - Primary     Complications of Diabetes:  none  Assessment of blood sugar control: Excellent  Lab Results   Component Value Date    HGBA1C 6.1 (H) 11/05/2019     Diabetes medication: Metformin 1000 twice daily, glipizide 2.5 extended release,   Statin medication (>40 or ^CVD Risk)-atorvastatin 40  At blood pressure goal (JNC 8 <140/90 all ages): Yes  Lab Results   Component Value Date    MICROALBUR 1.24 11/27/2018     Ace/arb indicated and taking?  No  Low dose ASA? (indicated for most men> 50, most women > 60 if any other card RF no  Up to date with yearly dilated retina eval?  Yes, per patient, due and referral made    Plan: Trial of stopping glipizide, continue metformin.  Patient is lost weight and may be able to get by without this.  Discussed checking blood sugars occasionally.  If blood sugars remain less than 1:30 in the morning, less than 150 later in the day, okay to hold glipizide.  Follow-up: 6 months           Relevant Orders    Glycosylated Hemoglobin A1c (Completed)    ALT (SGPT)    Ambulatory referral to Ophthalmology    Basic Metabolic Panel    Custom LDL Cholesterol, Direct    Microalbumin, Random Urine    Myasthenia gravis (H)     Doing well, on Imuran         Lymphadenopathy     Concerning, firm, slow-growing, until recently painless pathologic lymph node right anterior cervical region.  Immune suppressed due to myasthenia gravis with Imuran  Soft tissue CT, referral to ENT  CBC done.  Labs done before I saw patient, did not do QuantiFERON HIV etc.         Relevant Orders    HM1(CBC and Differential)    CT Soft Tissue Neck With and Without Contrast    Ambulatory referral to ENT    HM1 (CBC with Diff)        Subjective  CC:  Chief Complaint   Patient presents with     Diabetes     Adenopathy     HPI:  DMII  Diabetes complications:  none  Lab Results   Component Value Date    HGBA1C 6.1  (H) 11/05/2019     Diabetes medications reviewed- compliance: See above    Additional diabetes objectives reviewed   Statin medication (>40 or ^CVD Risk) Yes: Atorvastatin 40  Blood pressure goal (JNC 8 <140/90 all ages)-Yes  BP Readings from Last 3 Encounters:   11/05/19 119/66   01/07/19 134/66   11/27/18 102/54     Lab Results   Component Value Date    MICROALBUR 1.24 11/27/2018     Ace/ARB if indicated-No  Low dose ASA? (indicated for most men> 50, most women > 60 if any other card RF No  Yearly dilated retina evaluation -Yes    Blood Sugar Control  Testing blood sugars/ frequency?  Has the ability to check but is not currently checking      Wt Readings from Last 3 Encounters:   11/05/19 217 lb (98.4 kg)   01/07/19 (!) 223 lb 8 oz (101.4 kg)   11/27/18 (!) 223 lb 4 oz (101.3 kg)         Sore Throat  Narrative: 6 weeks of R sided throat pain  Before this, 5 months painless gland swelling on the right side  Now right ear pain  On Imuran  No fevers, chills, sweats, other concerns about infection, except current ear pain        PFSH:  Patient Active Problem List   Diagnosis     Type 2 diabetes mellitus (H)     Obesity     Hyperlipidemia     Healthcare maintenance     Myasthenia gravis (H)     Rash and nonspecific skin eruption     Colon cancer screening     Lymphadenopathy     Current medications reviewed as follows:  Current Outpatient Medications on File Prior to Visit   Medication Sig     atorvastatin (LIPITOR) 40 MG tablet TAKE 1 TABLET BY MOUTH EVERYDAY AT BEDTIME     azaTHIOprine (IMURAN) 50 mg tablet Take 50 mg by mouth daily. Use as directed     glipiZIDE (GLUCOTROL XL) 2.5 MG 24 hr tablet TAKE 1 TABLET (2.5 MG TOTAL) BY MOUTH DAILY.     metFORMIN (GLUCOPHAGE) 500 MG tablet Take 2 tablets (1,000 mg total) by mouth 2 (two) times a day with meals.     pyridostigmine (MESTINON) 60 mg tablet Take 60 mg by mouth. Use as directed     [DISCONTINUED] atorvastatin (LIPITOR) 80 MG tablet TAKE 1 TABLET (80 MG TOTAL) BY  "MOUTH DAILY.     [DISCONTINUED] metFORMIN (GLUCOPHAGE) 500 MG tablet TAKE 2 TABLETS (1,000 MG TOTAL) BY MOUTH 2 (TWO) TIMES A DAY WITH MEALS     No current facility-administered medications on file prior to visit.      Social History     Tobacco Use   Smoking Status Never Smoker   Smokeless Tobacco Never Used   Tobacco Comment    no passive exposure     Social History     Patient does not qualify to have social determinant information on file (likely too young).   Social History Narrative     Not on file     Patient Care Team:  Israel Quiroz MD as PCP - General (Family Medicine)  Israel Quiroz MD as Assigned PCP  [facility]  Full 10 system review including constitutional, respiratory, cardiac, gi, urinary, rheumatologic, neurologic, reproductive, dermatologic psychiatric is  performed  and is otherwise negative         Objective  Physical Exam  Vitals:    11/05/19 0832   BP: 119/66   Patient Site: Left Arm   Patient Position: Sitting   Cuff Size: Adult Large   Pulse: 72   Resp: 18   Temp: 98.3  F (36.8  C)   TempSrc: Oral   Weight: 217 lb (98.4 kg)   Height: 5' 8\" (1.727 m)     Body mass index is 32.99 kg/m .  Head- normocephalic atraumatic.  Normal conjunctiva, pupils equal.  No lid abnormalities apparent.  Normal auricles.  Ear canals appear normal.  TMs well visualized and both are normal.  Oral cavity without abnormality, no ulcers.  Acceptable dentition.  Pharynx is normal in appearance without erythema or exudate.  Neck exam shows a 2 to 3 mm anterior cervical node, firm, painless  Diagnostics:   Results for orders placed or performed in visit on 11/05/19   Glycosylated Hemoglobin A1c   Result Value Ref Range    Hemoglobin A1c 6.1 (H) 3.5 - 6.0 %           Please note: Voice recognition software was used in this dictation.  It may therefore contain typographical errors.            "

## 2021-06-03 NOTE — PROGRESS NOTES
[facility] PRE-OPERATIVE VISIT FOR:    Cj Morales,  1965, MRN [id number]    Upcoming surgery date:   Surgeon: Daljit  Surgery Facility: [facility]    Chief Complaint: Preop pharyngeal biopsy of mass/direct laryngoscopy    PCP: Israel Quiroz MD, [phone]    SUBJECTIVE:  History of Present Illness:   Cj Morales is a 54 y.o. male with history of right neck and pharyngeal pain and mass, gradually worsening over the last few months.    Patient presented to my office 2019 with painless lymphadenopathy on the right anterior cervical region, right-sided otalgia and throat pain.    CT scan revealed  IMPRESSION:   1.  Large right oropharyngeal mass with adjacent trans-spatial necrotic conglomerate lymph node mass as described, worrisome for head and neck carcinoma such as squamous cell cancer. No convincing remote pathologic lymphadenopathy.  2.  Dental abnormalities with periapical lucencies, heterotopic bone along the anterior mandible margin and probable dental caries.  3.  Calcified hilar lymph nodes.  4.  Results discussed with Dr. Quiroz at 0917 hours, 2019.    Subsequently, fine-needle aspiration revealed squamous cell carcinoma.  Dr. Nascimento reports that tumor markers were equivocal and has recommended direct biopsy of pharyngeal mass.    Patient has a history of myasthenia gravis and is on long-term azathioprine.  Also has type 2 diabetes which is well controlled with metformin 1000 twice daily.  Past Medical History:  Patient Active Problem List   Diagnosis     Type 2 diabetes mellitus (H)     Obesity     Hyperlipidemia     Healthcare maintenance     Myasthenia gravis (H)     Rash and nonspecific skin eruption     Colon cancer screening     Lymphadenopathy     Malignant neoplasm of pharynx (H)     No past surgical history on file.  Any history of anesthesia reaction no  Do you have difficulty breathing or chest pain after walking up a flight of stairs:  No  History of obstructive sleep apnea: No  Steroid use in the last 6 months: No  Frequent Aspirin/NSAID use: No  Prior Blood Transfusion: No  Prior Blood Transfusion Reaction: No  If for some reason prior to, during or after the procedure, if it is medically indicated, would you be willing to have a blood transfusion?:  There is no transfusion refusal.    Social History:  he  reports that he has never smoked. He has never used smokeless tobacco.    Family History:  No family history on file.    Current Medications:  Current Outpatient Medications   Medication Sig Dispense Refill     atorvastatin (LIPITOR) 40 MG tablet TAKE 1 TABLET BY MOUTH EVERYDAY AT BEDTIME 90 tablet 1     azaTHIOprine (IMURAN) 50 mg tablet Take 50 mg by mouth daily. Use as directed       glipiZIDE (GLUCOTROL XL) 2.5 MG 24 hr tablet TAKE 1 TABLET (2.5 MG TOTAL) BY MOUTH DAILY. 90 tablet 3     metFORMIN (GLUCOPHAGE) 500 MG tablet Take 2 tablets (1,000 mg total) by mouth 2 (two) times a day with meals. 120 tablet 3     pyridostigmine (MESTINON) 60 mg tablet Take 60 mg by mouth. Use as directed       No current facility-administered medications for this visit.        Allergies:  Penicillins    Review or Systems:  Constitution: normal  HEENT: normal  Pulmonary: normal  Cardiovascular: normal  GI: normal  : normal  Musculoskeletal: normal  Neuro: normal  Endocrine: normal  Psych: normal  Lymph/Heme: normal    OBJECTIVE:  Vitals:    11/26/19 0959   BP: 112/64   Pulse: 76   Resp: 17   Temp: 98.5  F (36.9  C)   SpO2: 98%     General Appearance:    Alert, cooperative, no distress, appears stated age   Head:    Normocephalic, without obvious abnormality, atraumatic   Eyes:    PERRL, conjunctiva/corneas clear, EOM's intact   Nose:   Mucosa moist, normal    Throat:   Lips, mucosa, and tongue normal; ora phaynx clear   Neck:   Supple, symmetrical, trachea midline, no adenopathy;     thyroid:  no enlargement/tenderness/nodules; no carotid    bruit or JVD    Lungs:     Clear to auscultation bilaterally, respirations unlabored    Heart:    Regular rate and rhythm, S1 and S2 normal, no murmur, rub   or gallop   Abdomen:     Soft, non-tender, bowel sounds active all four quadrants,     no masses, no organomegaly   Extremities:   Extremities normal, atraumatic, no cyanosis or edema   Pulses:   2+ and symmetric all extremities   Skin:   Skin color, texture, turgor normal, no rashes or lesions   Neurologic:   No focal deficits         Labs:  Results for orders placed or performed in visit on 11/26/19   Electrocardiogram Perform - Clinic   Result Value Ref Range    SYSTOLIC BLOOD PRESSURE      DIASTOLIC BLOOD PRESSURE      VENTRICULAR RATE 70 BPM    ATRIAL RATE 70 BPM    P-R INTERVAL 152 ms    QRS DURATION 132 ms    Q-T INTERVAL 442 ms    QTC CALCULATION (BEZET) 477 ms    P Axis 43 degrees    R AXIS -30 degrees    T AXIS -2 degrees    MUSE DIAGNOSIS       Normal sinus rhythm  Left axis deviation  Right bundle branch block  Abnormal ECG  No previous ECGs available     Personally reviewed and agree.  Previous EKGs for comparison.  On 11/5/2019, hemoglobin was 12.5, white blood cell count 3.9, platelets 140,000.  MCV was 108.  A1c on that date was 6.1.  Basic metabolic profile was normal except slightly elevated glucose at 146, chloride 108, ALT was 39.  ASSESSMENT/PLAN:  1. Preop examination  Electrocardiogram Perform - Clinic   2. Malignant neoplasm of pharynx (H)  Electrocardiogram Perform - Clinic     Low intermediate risk surgery  No known cardiac disease  Acceptable functional capacity    Acceptable risk for surgery  No special recommendations:  Pyridostigmine alone.          Israel Quiroz MD

## 2021-06-03 NOTE — TELEPHONE ENCOUNTER
Refill Approved    Rx renewed per Medication Renewal Policy. Medication was last renewed on 5/24/19.    Aline Ogden, [facility] Connection Triage/Med Refill 11/7/2019     Requested Prescriptions   Pending Prescriptions Disp Refills     glipiZIDE (GLUCOTROL XL) 2.5 MG 24 hr tablet [Pharmacy Med Name: GLIPIZIDE ER 2.5 MG TABLET] 90 tablet 1     Sig: TAKE 1 TABLET (2.5 MG TOTAL) BY MOUTH DAILY.       Oral Hypoglycemics Refill Protocol Passed - 11/7/2019  1:23 AM        Passed - Visit with PCP or prescribing provider visit in last 6 months       Last office visit with prescriber/PCP: 11/5/2019 OR same dept: 11/5/2019 Israel Quiroz MD OR same specialty: 11/5/2019 Israel Quiroz MD Last physical: Visit date not found Last MTM visit: Visit date not found         Next appt within 3 mo: Visit date not found  Next physical within 3 mo: Visit date not found  Prescriber OR PCP: Irsael Quiroz MD  Last diagnosis associated with med order: 1. Type 2 diabetes mellitus (H)  - glipiZIDE (GLUCOTROL XL) 2.5 MG 24 hr tablet [Pharmacy Med Name: GLIPIZIDE ER 2.5 MG TABLET]; TAKE 1 TABLET (2.5 MG TOTAL) BY MOUTH DAILY.  Dispense: 90 tablet; Refill: 1     If protocol passes may refill for 12 months if within 3 months of last provider visit (or a total of 15 months).           Passed - A1C in last 6 months     Hemoglobin A1c   Date Value Ref Range Status   11/05/2019 6.1 (H) 3.5 - 6.0 % Final               Passed - Microalbumin in last year      Microalbumin, Random Urine   Date Value Ref Range Status   11/05/2019 0.80 0.00 - 1.99 mg/dL Final                  Passed - Blood pressure in last year     BP Readings from Last 1 Encounters:   11/05/19 119/66             Passed - Serum creatinine in last year     Creatinine   Date Value Ref Range Status   11/05/2019 0.68 (L) 0.70 - 1.30 mg/dL Final

## 2021-06-04 ENCOUNTER — RECORDS - HEALTHEAST (OUTPATIENT)
Dept: ADMINISTRATIVE | Facility: OTHER | Age: 56
End: 2021-06-04

## 2021-06-04 VITALS
HEART RATE: 86 BPM | BODY MASS INDEX: 28.19 KG/M2 | DIASTOLIC BLOOD PRESSURE: 61 MMHG | HEIGHT: 68 IN | SYSTOLIC BLOOD PRESSURE: 99 MMHG | WEIGHT: 186 LBS | RESPIRATION RATE: 20 BRPM

## 2021-06-04 VITALS
DIASTOLIC BLOOD PRESSURE: 65 MMHG | BODY MASS INDEX: 25.46 KG/M2 | RESPIRATION RATE: 16 BRPM | HEART RATE: 84 BPM | WEIGHT: 168 LBS | SYSTOLIC BLOOD PRESSURE: 104 MMHG | HEIGHT: 68 IN

## 2021-06-04 VITALS — BODY MASS INDEX: 24.33 KG/M2 | WEIGHT: 160 LBS

## 2021-06-04 VITALS
BODY MASS INDEX: 29.19 KG/M2 | RESPIRATION RATE: 16 BRPM | TEMPERATURE: 97.6 F | SYSTOLIC BLOOD PRESSURE: 110 MMHG | HEIGHT: 67 IN | WEIGHT: 186 LBS | DIASTOLIC BLOOD PRESSURE: 48 MMHG | HEART RATE: 88 BPM

## 2021-06-04 VITALS — WEIGHT: 162 LBS | BODY MASS INDEX: 24.63 KG/M2

## 2021-06-04 NOTE — BRIEF OP NOTE
Interventional Radiology Post-Procedure Note   [facility]    Patient name: Cj Morales  Pt MRN:[id number]   Date of procedure: 12/23/2019     Procedure: Venous Port Placement    Sedation method: Moderate Sedation    Pre Procedure Diagnosis: Tongue Cancer; Need for long term central venous access  Post Procedure Diagnosis: Same    Contrast: None    Medications:  Midazolam 2 mg IV  Fentanyl 100 mcg IV  Clindamycin 900 mg IV    Sedation time: 30 min    Estimated Blood Loss: Minimal    Complications: None    Findings/Plan:  1. Successful placement of right internal jugular port.   2. Catheter tip lies at the superior cavo-atrial junction.   3. Catheter is flushed with heparin and ready for immediate use.   ?   Please see dictation in PACS or under the Imaging tab in [facility] for detailed procedure note.     Abiodun Arevalo M.D.   Vascular and Interventional Radiology   Pager: [phone]  After Hours / Scheduling: [phone]     12/23/2019  8:50 AM

## 2021-06-04 NOTE — PRE-PROCEDURE
Procedure Name: port placement  Date/Time: 12/23/2019 7:56 AM  Written consent obtained?: Yes  Risks and benefits: Risks, benefits and alternatives were discussed  Consent given by: patient  Expected level of sedation: moderate  ASA Class: Class 3- Severe systemic disease, definite functional limitations  Mallampati: Grade 3- soft palate visible, posterior pharyngeal wall not visible  Patient states understanding of procedure being performed: Yes  Patient's understanding of procedure matches consent: Yes  Procedure consent matches procedure scheduled: Yes  Appropriately NPO: yes  Lungs: lungs clear with good breath sounds bilaterally  Heart: normal heart sounds and rate  History & Physical reviewed: History and physical reviewed and no updates needed  Statement of review: I have reviewed the lab findings, diagnostic data, medications, and the plan for sedation

## 2021-06-04 NOTE — PROCEDURES
[facility]    Procedure: Port Placement  Date/Time: 12/23/2019 8:49 AM  Performed by: Abiodun Arevalo MD  Authorized by: Abiodun Arevalo MD       Universal Protocol    Site marked: NA    Prior images obtained and reviewed: Yes    Required items: required blood products, implants, devices, and special equipment available    Patient identity confirmed: verbally with patient, arm band, provided demographic data and hospital-assigned identification number    Reevaluation: Patient was reevaluated immediately before administering moderate or deep sedation or anesthesia    Confirmation checklist: relevant allergies, patient's identity using two indicators, procedure was appropriate and matched the consent or emergent situation and correct equipment/implants were available    Time out: Immediately prior to procedure a time out was called to verify the correct patient, procedure, equipment, support staff and site/side marked as required    Universal Protocol: Joint Commission Universal Protocol was followed    Preparation: Patient was prepped and draped in the usual sterile fashion    ESBL (mL): 5    Anesthesia    Local anesthesia used?: Yes    Anesthesia: local infiltration    Local anesthetic: lidocaine 1% without epinephrine    Anesthetic total (mL): 15    Sedation    Patient sedation: Yes    Sedation type: moderate (conscious) sedation    Sedation: fentanyl, midazolam and see MAR for details    Vital signs: Vital signs monitored during sedation    Post-procedure    Description of procedure: Right IJV Port Palced    Patient tolerance: Patient tolerated the procedure well with no immediate complications   Length of time physician present for 1:1 monitoring during sedation: 30

## 2021-06-05 VITALS
TEMPERATURE: 97.5 F | HEART RATE: 85 BPM | RESPIRATION RATE: 14 BRPM | BODY MASS INDEX: 28.49 KG/M2 | WEIGHT: 188 LBS | SYSTOLIC BLOOD PRESSURE: 119 MMHG | HEIGHT: 68 IN | DIASTOLIC BLOOD PRESSURE: 70 MMHG

## 2021-06-05 NOTE — PROGRESS NOTES
TCM DISCHARGE FOLLOW UP CALL    Discharge Date:  1/27/2020  Reason for hospital stay (discharge diagnosis)::  Dehydration, dysphagia  Are you feeling better, the same or worse since your discharge?:  Patient is feeling better (TF and flushes are going well. Mouth is sore, throat isn't. Is using mouthwash and lidocaine.  Is taking some p.o. )  Do you feel like you have a plan in the event of a health emergency?: Yes (wife)    As part of your discharge plan, were  home care services ordered for you?: Yes    Have you seen them yet, or are they scheduled to visit?: No    Did you receive any new medications, or was there a change to your medications?: Yes    Are you taking those medications, or do you have any established regiment?:  Pt finished cefdinir yesterday.  Do you have any follow up visits scheduled with your PCP or Specialist?:  No  I'm glad to hear you're doing well and we want you to continue to do well. Your PCP would like to see you for a follow-up visit. Can we help set that up for your today?: No    (RN) Provided patient the PCP's phone number to call if they have any questions or concerns?: No (Pt has c;thanh phone number. He plan to schedule appt through [facility] [employer] [facility])

## 2021-06-05 NOTE — TELEPHONE ENCOUNTER
Refill Approved    Rx renewed per Medication Renewal Policy. Medication was last renewed on 6/27/19.    Sandy Rose, [facility] Connection Triage/Med Refill 2/7/2020     Requested Prescriptions   Pending Prescriptions Disp Refills     atorvastatin (LIPITOR) 40 MG tablet [Pharmacy Med Name: ATORVASTATIN 40 MG TABLET] 90 tablet 1     Sig: TAKE 1 TABLET BY MOUTH EVERYDAY AT BEDTIME       Statins Refill Protocol (Hmg CoA Reductase Inhibitors) Passed - 2/7/2020  2:34 AM        Passed - PCP or prescribing provider visit in past 12 months      Last office visit with prescriber/PCP: 11/26/2019 Israel Quiroz MD OR same dept: 11/26/2019 Israel Quiroz MD OR same specialty: 11/26/2019 Israel Quiroz MD  Last physical: Visit date not found Last MTM visit: Visit date not found   Next visit within 3 mo: Visit date not found  Next physical within 3 mo: Visit date not found  Prescriber OR PCP: Israel Quiroz MD  Last diagnosis associated with med order: 1. Pure hypercholesterolemia  - atorvastatin (LIPITOR) 40 MG tablet [Pharmacy Med Name: ATORVASTATIN 40 MG TABLET]; TAKE 1 TABLET BY MOUTH EVERYDAY AT BEDTIME  Dispense: 90 tablet; Refill: 1    If protocol passes may refill for 12 months if within 3 months of last provider visit (or a total of 15 months).                         
32.5

## 2021-06-05 NOTE — PROGRESS NOTES
The Clinic Care Guide called the patient  today at the request of the PCP to discuss possible clinic care coordination enrollment. Patient declined enrollment into CCC. At this time Care guide will discontinue outreach at this time. If they would like to enroll into CCC at a later date an order can placed, and Care guide will outreach to the patient again.

## 2021-06-05 NOTE — PROGRESS NOTES
"[facility] CLINIC EXAM NOTE      Chief Complaint   Patient presents with     Concerns     feeding tube, liquid is coming out along the edge, concerns balloon isn't as inflated, a suture button is still there       ASSESSMENT & PLAN    Cj was seen today for concerns.    Diagnoses and all orders for this visit:    Oropharyngeal dysphagia  -     Cancel: IR Site Check and Evaluation; Future  -     IR Site Check and Evaluation; Future    Complaint associated with gastric tube (H):  No s/s of infection. At this time I would like IR to evaluate and see what needs to be done. Appears quite loose. We were able to get him scheduled with IR today at 1PM. Please see their note for further information. Instructed patient to f/u with PCP next week   -     IR Site Check and Evaluation; Future        HISTORY    Cj Morales is a 54 y.o.  male who presents for the following issues:    Diagnosed with oropharyngeal CA. Undergoing radiation treatment. Recently hospitalized for 10 days end of January for several concerns including neutropenia. Had oropharyngeal dysphagia and G-tube was placed by IR on 1/24. He reports he was doing well. Had home care f/u. Has not been seen in clinic yet for f/u. This morning started to notice water coming out around the G-tube. Took the gauze out and made the end of the tube flush with the skin and re taped. Made appt. Home care RN thought maybe he could be seen to inflate the balloon as this is likely the issue.   No pus-like drainage. No surrounding redness. No fevers. \"besides having cancer Im doing okay\"      MEDICATIONS    Current Outpatient Medications on File Prior to Visit   Medication Sig Dispense Refill     acetaminophen (TYLENOL) 325 mg/10.15 mL Soln Take 20.3 mL (650 mg total) by mouth every 4 (four) hours as needed.  0     alum/mag hydrox-simethicone-diphenhydramine-lidocaine (MAGIC MOUTHWASH) suspension Swish and spit 15 mL every 2 (two) hours as needed (Stomatitis). " 120 mL 0     azaTHIOprine (IMURAN) 50 mg tablet Take 3 tablets (150 mg total) by mouth 2 (two) times a day. 3 tablets twice daily  0     blood glucose test strips Use 1 each As Directed as needed. Dispense brand per patient's insurance at pharmacy discretion. 100 strip 0     glipiZIDE (GLUCOTROL XL) 2.5 MG 24 hr tablet TAKE 1 TABLET (2.5 MG TOTAL) BY MOUTH DAILY. 90 tablet 3     lidocaine-prilocaine (EMLA) cream Apply topically as needed. Port       LORazepam (ATIVAN) 0.5 MG tablet Take 0.5 mg by mouth every 4 (four) hours as needed for anxiety. Nausea       metFORMIN (GLUCOPHAGE) 500 MG tablet Take 2 tablets (1,000 mg total) by mouth 2 (two) times a day with meals. 120 tablet 3     OLANZapine (ZYPREXA) 5 MG tablet Take 5 mg by mouth at bedtime.       omeprazole (PRILOSEC) 20 MG capsule Take 20 mg by mouth daily before breakfast.       prochlorperazine (COMPAZINE) 10 MG tablet Take 10 mg by mouth every 6 (six) hours as needed for nausea.       pyridostigmine (MESTINON) 60 mg tablet Take 60 mg by mouth daily.        sodium bicarbonate 325 MG tablet 1 tablet (325 mg total) by Enteral Tube route as needed (For clogged feeding tube). OTC product 30 tablet 0     viscous lidocaine HC 2 % Soln viscous solution Take 15 mL by mouth 4 (four) times a day before meals and at bedtime. 100 mL 0     atorvastatin (LIPITOR) 40 MG tablet TAKE 1 TABLET BY MOUTH EVERYDAY AT BEDTIME 90 tablet 2     No current facility-administered medications on file prior to visit.        Pertinent past medical, surgical, social and family history reviewed and updated in [facility].    Social History     Socioeconomic History     Marital status:      Spouse name: Not on file     Number of children: Not on file     Years of education: Not on file     Highest education level: Not on file   Occupational History     Not on file   Social Needs     Financial resource strain: Not on file     Food insecurity:     Worry: Not on file     Inability: Not on file  "    Transportation needs:     Medical: Not on file     Non-medical: Not on file   Tobacco Use     Smoking status: Never Smoker     Smokeless tobacco: Never Used     Tobacco comment: no passive exposure   Substance and Sexual Activity     Alcohol use: Not Currently     Drug use: Never     Sexual activity: Not on file   Lifestyle     Physical activity:     Days per week: Not on file     Minutes per session: Not on file     Stress: Not on file   Relationships     Social connections:     Talks on phone: Not on file     Gets together: Not on file     Attends Islam service: Not on file     Active member of club or organization: Not on file     Attends meetings of clubs or organizations: Not on file     Relationship status: Not on file     Intimate partner violence:     Fear of current or ex partner: Not on file     Emotionally abused: Not on file     Physically abused: Not on file     Forced sexual activity: Not on file   Other Topics Concern     Not on file   Social History Narrative     Not on file         REVIEW OF SYSTEMS    ROS: 10 pt review of systems preformed and all negative except noted in HPI.     PHYSICAL EXAM    /48 (Patient Site: Right Arm, Patient Position: Sitting, Cuff Size: Adult Regular)   Pulse 88   Temp 97.6  F (36.4  C) (Oral)   Resp 16   Ht 5' 7\" (1.702 m)   Wt 186 lb (84.4 kg)   BMI 29.13 kg/m    GEN:  54 y.o. male sitting comfortably in no apparent distress.   HEENT: EOMI, no scleral icterus  CHEST/LUNG: No respiratory distress,  ABD:  Soft, non-tender, non distended, no guarding,  No masses. Gtube site has no surrounding erythema. There is yellowish tinged sticky milky mucous coming from the site. Appears consistent with patient's formula. G-tube appears to be quite loose and I didn't want to pull too much for fear it would come out.       At the end of the encounter, I discussed results, diagnosis, medications. Discussed red flags for immediate return to clinic/ER, as well as " indications for follow up if no improvement. Patient and/or caregiver understood and agreed to plan. Patient was stable for discharge.      Silvia Lino

## 2021-06-05 NOTE — PROGRESS NOTES
MTM Transition of Care Encounter  Assessment & Plan                                                     Post Discharge Medication Reconciliation Status: discharge medications reconciled and changed, per note/orders (see AVS)    Dysphagia with need for feeding tube: Patient is feeling better and understands what his medications are for.     Thrombocytopenia: Will recheck labs at future appointments and defer to oncology.     Pancytopenia with neutropenic fever: Will recheck labs at future appointments and defer to oncology.     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     Type 2 Diabetes:  well controlled. A1C at goal of <7%. FBG at goal  mg/dL. Reviewed to monitor for hypoglycemia due to being on glipizide and consider d/cing if low appetite and values <70.     Myasthenia gravis: Patient to continue pyridostigmine at this time. Possible that azathioprine was contributing to his immunosuppression but will defer to oncology and rheum about how long to remain off.     GERD: Stable. Recommended to continue current regimen.     Follow Up  As needed with MTM     Subjective & Objective                                                       Cj Morales is a 54 y.o. male called for a transitions of care visit. he was discharged from [facility] on 1/27/20 for dehydration.    Chief Complaint: better since home. Thrush is gone.     Medication Adherence/Access: Patient is very familiar with his medications. No adherence concerns.     Dysphagia with need for feeding tube: Patient presented with sore throat and treated for dehydration and severe malnutrition. Patient was having dysphagia due to radiation and/or oropharyngeal candidiasis. Oncology consulted and recommend feeding tube for additional nutrition. Discharged home on Magic Mouthwash, viscous lidocaine 2%, and sodium bicarbonate 325 mg PRN. Sodium bicarb is for clogged feeding tube. Has not needed yet. Reports that he does not want to  swallow the Magic Mouthwash and it only lasts 20-25 minutes. Using lido with swab in his mouth which helps a lot. Sores on tongue and throat have cleared since off chemo for a week. They will be decreasing his chemo he reports. Will be doing a genetic test of the biopsy.     Thrombocytopenia: Platelet count (1/23/2020) was 36. 1 unit of irradiated platelet was given on 1/23 none further needed no bleeding. Recommended to repeat labs at PCP. Recommended to stay off aspirin and NSAIDs.     Pancytopenia with neutropenic fever: pancytopenia thought to be mutlifactorial - malnutrition, MG, and chemotherapy-induced. ID consulted and switched patient from IV vanco + IV levaquin + tobramycin to IV cefepime and discharge on Cefdinir 300 mg two times a day x 2 days. Completed cefdinir. s/p 3 units PRBC transfusion as of 1/22/2020. Repeat Hgb late evening on 1/22/2020 was 6.7. One unit of PRBC was ordered for patient and repeat Hgb was 7.8 on morning of 12/23/2020. Monitor Hgb q 12 hours and transfuse to keep Hgb > 7. Per oncology, transfuse if platelet count < 20K or uncontrolled bleeding. Daily CBC to monitor WBC and ANC and platelet count. Neutropenic precautions no longer and off filgrastim   Last Hgb level = 7.5 on 1/27/20    Right pharyngeal SCC: Follows up with Dr Cole. Completed 3 cycles of chemotherapy. 7 cycles planned per patient. 4th cycle to start on Monday. Completed 15/35 sessions of radiation. Gets radiation M-F. Taking compazine 10 mg every 6 hours PRN nausea. Does not get nausea from the chemo. Takes olanzapine and lorazepam at bedtime for chemo to ppx and to help him sleep.     Type 2 Diabetes: Patient was on glipizide XL 2.5 mg daily and metformin 500 mg x2 (1000 mg) two times a day but the pills were difficult to swallow. Now he is taking. Held in hospital and recommended to resume at discharge and if still difficult then change and crush in feeding tube. No swallowing issues.   Reports blood sugars:  .   Last A1c checked 11/5/19 = 6.1%.   Hypoglycemia lows none  Microalbumin checked 11/5/19  Currently taking atorvastatin 40 mg daily. Denies myalgias. Last LDL checked 11/5/19 = 31.    Myasthenia gravis: Taking pyridostigmine 60 mg daily. azathioprine 50 mg x3 (150 mg) two times a day was held and oncology or rheum to determine when safe to resume. Without the aza so far fine. Wondering about too much immunosuppressant.      GERD: Taking omeprazole 20 mg daily. Taking before breakfast, tiny reflux when started chemo but better now.       PMH: reviewed in EPIC   Allergies/ADRs: reviewed in EPIC   Alcohol: reviewed in EPIC  Tobacco:   Social History     Tobacco Use   Smoking Status Never Smoker   Smokeless Tobacco Never Used   Tobacco Comment    no passive exposure     Recent Vitals:   BP Readings from Last 3 Encounters:   01/27/20 108/61   12/23/19 113/62   11/26/19 112/64      Wt Readings from Last 3 Encounters:   01/24/20 182 lb 14.4 oz (83 kg)   12/23/19 201 lb (91.2 kg)   12/05/19 209 lb (94.8 kg)     ----------------    The patient declined an after visit summary    I spent 15 minutes with this patient today;  . All changes were made via collaborative practice agreement with Israel Quiroz MD. A copy of the visit note was provided to the patient's provider.     Mimi Asif, Pharm.D., BCACP  Medication Therapy Management Pharmacist  [facility]     Current Outpatient Medications   Medication Sig Dispense Refill     acetaminophen (TYLENOL) 325 mg/10.15 mL Soln Take 20.3 mL (650 mg total) by mouth every 4 (four) hours as needed.  0     alum/mag hydrox-simethicone-diphenhydramine-lidocaine (MAGIC MOUTHWASH) suspension Swish and spit 15 mL every 2 (two) hours as needed (Stomatitis). 120 mL 0     atorvastatin (LIPITOR) 40 MG tablet TAKE 1 TABLET BY MOUTH EVERYDAY AT BEDTIME 90 tablet 1     azaTHIOprine (IMURAN) 50 mg tablet Take 3 tablets (150 mg total) by mouth 2 (two) times a  day. 3 tablets twice daily  0     blood glucose test strips Use 1 each As Directed as needed. Dispense brand per patient's insurance at pharmacy discretion. 100 strip 0     cefdinir (OMNICEF) 300 MG capsule Take 1 capsule (300 mg total) by mouth 2 times a day at 6:00 am and 4:00 pm for 2 days. 4 capsule 0     glipiZIDE (GLUCOTROL XL) 2.5 MG 24 hr tablet TAKE 1 TABLET (2.5 MG TOTAL) BY MOUTH DAILY. 90 tablet 3     lidocaine-prilocaine (EMLA) cream Apply topically as needed. Port       LORazepam (ATIVAN) 0.5 MG tablet Take 0.5 mg by mouth every 4 (four) hours as needed for anxiety. Nausea       metFORMIN (GLUCOPHAGE) 500 MG tablet Take 2 tablets (1,000 mg total) by mouth 2 (two) times a day with meals. 120 tablet 3     OLANZapine (ZYPREXA) 5 MG tablet Take 5 mg by mouth at bedtime.       omeprazole (PRILOSEC) 20 MG capsule Take 20 mg by mouth daily before breakfast.       prochlorperazine (COMPAZINE) 10 MG tablet Take 10 mg by mouth every 6 (six) hours as needed for nausea.       pyridostigmine (MESTINON) 60 mg tablet Take 60 mg by mouth daily.        sodium bicarbonate 325 MG tablet 1 tablet (325 mg total) by Enteral Tube route as needed (For clogged feeding tube). OTC product 30 tablet 0     viscous lidocaine HC 2 % Soln viscous solution Take 15 mL by mouth 4 (four) times a day before meals and at bedtime. 100 mL 0     No current facility-administered medications for this visit.

## 2021-06-05 NOTE — PROGRESS NOTES
Bone Marrow Biopsy and Aspiration Procedure Note     Informed consent was obtained and potential risks including bleeding, infection and pain were reviewed with the patient.     Posterior iliac crest(s) prepped with Betadine.     Lidocaine 1% local anesthesia infiltrated into the subcutaneous tissue.    Right PIC bone marrow biopsy and bone marrow aspirate was obtained.     The procedure was tolerated well and there were no complications.    Specimens sent for: routine.    Physician: Jae Callaway

## 2021-06-06 NOTE — PROGRESS NOTES
"Cj \"Albert\" arrived A&Ox4 ambulatory and stable, confirms he is here for blood transfusion. Seated in chair #4. Pt has neoplasm base of tongue; anemia. Had radiation to throat, reports thick secretions and unable to swallow, pt has G Tube. Pt reports little symptoms of anemia, tiredness mostly. POC/transfusion education reviewed, pt stated understanding and agreed to plan. No pre meds ordered. Consent verified.  Port accessed with ease, excellent blood return, T&S drawn and port easily flushed NS20mL  No pre meds ordered. Albert was transfused with 2u PRBC as ordered; tolerated w/o sxs transfusion reaction. Line flushed with NS until clear. Monitored for 30min post transfusion and VSS, no sxs transfuion reaction. Dc education/AVS reviewed, pt stated understanding and that his needs were met today. Port [id number], instilled with heparin 6mL 1:100 and gripper needle dc'd, site covered w gauze/paper tape. 1420 Albert cardoza'd A&Ox4 ambulatory and stable.  "

## 2021-06-06 NOTE — PROGRESS NOTES
Wt Readings from Last 3 Encounters:   02/14/20 186 lb (84.4 kg)   02/07/20 186 lb (84.4 kg)   01/24/20 182 lb 14.4 oz (83 kg)     Assessment/ Plan  Problem List Items Addressed This Visit        Unprioritized    Type 2 diabetes mellitus (H)     Blood sugars are generally 90-1 20, on metformin 1000 twice daily and glipizide XL 2.5.  Higher on days with steroid  Has lost a great deal of weight-with cancer treatment.    Discussed importance of prevention of hypoglycemia.  Would drop glipizide should blood sugars begin to go down less than 90.  Also, discussed stopping metformin should he have any renal dysfunction.  For now, no changes    Lab Results   Component Value Date    HGBA1C 6.1 (H) 11/05/2019            Myasthenia gravis (H)     Patient relates that he will follow-up with Dr. Stephens for planning of immune suppression following chemotherapy         Malignant neoplasm of pharynx (H) - Primary      Other Visit Diagnoses     Stomatitis and mucositis        Likely radiation related, recent thrush, no sign of that now though patient is using nystatin        Subjective  CC:  Chief Complaint   Patient presents with     Follow-up     HPI:  Complex recent medical history and Albert.  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  Undergoing chemotherapy plus radiation, eyes to possible resection of the tumor if it is downsized.    History before that of myasthenia gravis.  On azathioprine.  Patient had a leukopenia crisis.  Was in the hospital for a number of days, now has recovered.    On a G-tube, most of his nutrition comes through that.    Generally tolerating things well.  A little bit of discomfort in his mouth with whitish mattering has made him restart his nystatin.  History of diabetes.  Taking his metformin and glipizide  PFSH:  Patient Active Problem List   Diagnosis     Type 2 diabetes mellitus (H)     Obesity     Hyperlipidemia     Healthcare maintenance     Myasthenia gravis  (H)     Rash and nonspecific skin eruption     Colon cancer screening     Lymphadenopathy     Malignant neoplasm of pharynx (H)     Dehydration     Dysphagia     Neutropenia (H)     Hypomagnesemia     Current medications reviewed as follows:  Current Outpatient Medications on File Prior to Visit   Medication Sig     acetaminophen (TYLENOL) 325 mg/10.15 mL Soln Take 20.3 mL (650 mg total) by mouth every 4 (four) hours as needed.     alum/mag hydrox-simethicone-diphenhydramine-lidocaine (MAGIC MOUTHWASH) suspension Swish and spit 15 mL every 2 (two) hours as needed (Stomatitis).     atorvastatin (LIPITOR) 40 MG tablet TAKE 1 TABLET BY MOUTH EVERYDAY AT BEDTIME     azaTHIOprine (IMURAN) 50 mg tablet Take 3 tablets (150 mg total) by mouth 2 (two) times a day. 3 tablets twice daily     blood glucose test strips Use 1 each As Directed as needed. Dispense brand per patient's insurance at pharmacy discretion.     glipiZIDE (GLUCOTROL XL) 2.5 MG 24 hr tablet TAKE 1 TABLET (2.5 MG TOTAL) BY MOUTH DAILY.     lidocaine-prilocaine (EMLA) cream Apply topically as needed. Port     LORazepam (ATIVAN) 0.5 MG tablet Take 0.5 mg by mouth every 4 (four) hours as needed for anxiety. Nausea     metFORMIN (GLUCOPHAGE) 500 MG tablet Take 2 tablets (1,000 mg total) by mouth 2 (two) times a day with meals.     OLANZapine (ZYPREXA) 5 MG tablet Take 5 mg by mouth at bedtime.     omeprazole (PRILOSEC) 20 MG capsule Take 20 mg by mouth daily before breakfast.     prochlorperazine (COMPAZINE) 10 MG tablet Take 10 mg by mouth every 6 (six) hours as needed for nausea.     pyridostigmine (MESTINON) 60 mg tablet Take 60 mg by mouth daily.      sodium bicarbonate 325 MG tablet 1 tablet (325 mg total) by Enteral Tube route as needed (For clogged feeding tube). OTC product     viscous lidocaine HC 2 % Soln viscous solution Take 15 mL by mouth 4 (four) times a day before meals and at bedtime.     No current facility-administered medications on file prior  "to visit.      Social History     Tobacco Use   Smoking Status Never Smoker   Smokeless Tobacco Never Used   Tobacco Comment    no passive exposure     Social History     Social History Narrative     Not on file     Patient Care Team:  Israel Quiroz MD as PCP - General (Family Medicine)  Israel Quiroz MD as Assigned PCP  Mimi Asif, PharmD as Pharmacist (Pharmacist)  ROS  As above      Objective  Physical Exam  Vitals:    02/14/20 1013   BP: 99/61   Patient Site: Right Arm   Patient Position: Sitting   Cuff Size: Adult Regular   Pulse: 86   Resp: 20   Weight: 186 lb (84.4 kg)   Height: 5' 8\" (1.727 m)     Body mass index is 28.28 kg/m .  Oral cavity not normal does not appear to have thrush.  She is alert, oriented, pale, no acute distress.  Diagnostics:   None      Please note: Voice recognition software was used in this dictation.  It may therefore contain typographical errors.      "

## 2021-06-07 NOTE — PROGRESS NOTES
Pt arrived amb for his labs, and 1 unit of blood, vss Went over todays plan of care, questions, Port accessed with good blood return, t&s done double checked and labeled, Pt was infused with with 1 unit of blood leana well, vss IV was flushed with ns then heparin, Needle dcd after Pt left amb at 1155  [place]

## 2021-06-09 NOTE — PROGRESS NOTES
Pt here for g-tube removal. Tube removed without any complications. Site was clean dry. Site covered with gauze and tape.

## 2021-06-09 NOTE — PROGRESS NOTES
Pre-Procedure Unconfirmed COVID Test     Cj Morales    COVID Screening  Due to the inability to confirm the patient's COVID status (positive or negative), the patient was screened for COVID symptoms     Patient reports the following:  Fever? No   Cough? No   Shortness of breath? No   Skin rash? No    If the patient is positive for new symptoms or worsening symptoms, and the procedure is deemed necessary by the ordering provider, notify your manager/supervisor     Patient Information  Patient informed of the no visitor policy  Patient instructed to continue to self-quarantine prior to procedure  Patient informed to contact the ordering provider if the following symptoms develop prior to procedure:   Fever  Cough  Shortness of Breath  Sore throat   Runny or stuffy nose  Muscle or body aches  Headaches  Fatigue  Vomiting or diarrhea   Rash    Nadia Vaughn

## 2021-06-09 NOTE — PRE-PROCEDURE
Pre-Procedure Unconfirmed COVID Test     Cj Morales    COVID Screening  Due to the inability to confirm the patient's COVID status (positive or negative), the patient was screened for COVID symptoms     Patient reports the following:  Fever? No   Cough? No   Shortness of breath? No   Skin rash? No    If the patient is positive for new symptoms or worsening symptoms, and the procedure is deemed necessary by the ordering provider, notify your manager/supervisor     Patient Information  Patient informed of the no visitor policy  Patient instructed to continue to self-quarantine prior to procedure  Patient informed to contact the ordering provider if the following symptoms develop prior to procedure:   Fever  Cough  Shortness of Breath  Sore throat   Runny or stuffy nose  Muscle or body aches  Headaches  Fatigue  Vomiting or diarrhea   Rash    Ervin Hawkins

## 2021-06-10 NOTE — PROCEDURES
[facility]    Procedure: IR Procedure Note    Date/Time: 8/17/2020 12:27 PM  Performed by: Mikael Freitas MD  Authorized by: Mikael Freitas MD       Universal Protocol    Site marked: Yes    Prior images obtained and reviewed: Yes    Required items: required blood products, implants, devices, and special equipment available    Patient identity confirmed: verbally with patient    Reevaluation: Patient was reevaluated immediately before administering moderate or deep sedation or anesthesia    Confirmation checklist: patient's identity using two indicators, relevant allergies, procedure was appropriate and matched the consent or emergent situation and correct equipment/implants were available    Time out: Immediately prior to procedure a time out was called to verify the correct patient, procedure, equipment, support staff and site/side marked as required    Universal Protocol: Joint Commission Universal Protocol was followed    Preparation: Patient was prepped and draped in the usual sterile fashion    ESBL (mL): 3    Anesthesia    Local anesthesia used?: Yes    Local anesthetic: lidocaine 1% without epinephrine and lidocaine 1% with epinephrine    Anesthetic total (mL): 20    Sedation    Patient sedation: Yes    Sedation type: moderate (conscious) sedation    Vital signs: Vital signs monitored during sedation  Specimens: none  Complications: None  Condition: Stable  Plan: Successful removal of right IJ chest port.  OK to discharge in 1 hour.    Post-procedure    Patient tolerance: Patient tolerated the procedure well with no immediate complications   Length of time physician present for 1:1 monitoring during sedation: 15

## 2021-06-10 NOTE — PRE-PROCEDURE
Procedure Name: port removal  Date/Time: 8/17/2020 11:53 AM  Written consent obtained?: Yes  Risks and benefits: Risks, benefits and alternatives were discussed  Consent given by: patient  Expected level of sedation: moderate  ASA Class: Class 3- Severe systemic disease, definite functional limitations  Mallampati: Grade 2- soft palate, base of uvula, tonsillar pillars, and portion of posterior pharyngeal wall visible  Patient states understanding of procedure being performed: Yes  Patient's understanding of procedure matches consent: Yes  Procedure consent matches procedure scheduled: Yes  Appropriately NPO: yes  Lungs: lungs clear with good breath sounds bilaterally  Heart: normal heart sounds and rate  History & Physical reviewed: Abbreviated history and physical done prior to moderate sedation  Statement of review: I have reviewed the lab findings, diagnostic data, medications, and the plan for sedation

## 2021-06-11 NOTE — PROGRESS NOTES
Assessment/ Plan  Problem List Items Addressed This Visit        Unprioritized    Type 2 diabetes mellitus (H) - Primary     A1c 4.7 after massive weight loss associated with malignancy.   Will stop glipizide, continue to follow blood sugars if they remain excellent, cut back on metformin to 500 twice daily.  Discussed even stopping metformin should blood sugars remain unchanged.           Relevant Orders    Glycosylated Hemoglobin A1c (Completed)    Myasthenia gravis (H)     Off of azathioprine, following with neurology, feeling relatively good.         Malignant neoplasm of pharynx (H)     Dr. Bazzi,   Just had biopsy of residual mass which was noncancerous, just scar tissue.               Subjective  CC:  Chief Complaint   Patient presents with     Diabetes     HPI:  Albert presents for follow-up on type 2 diabetes.  Of course recently diagnosed with oropharyngeal cancer, felt to be related to immune suppression for myasthenia gravis.  He is gone through chemotherapy directed by [place] [facility] and radiation therapy.  No active disease is detected on most recent scan.  Reviewed note from radiation therapy earlier this month.  Reviewed note from Dr. Bazzi and recent CT dated 8/10.    Last A1c was 6.1.  Brings in blood sugars daily for the last few days.  117, 97, 132, 104, 85, 94, 97, 95, 131, 91, 98  PFSH:  Patient Active Problem List   Diagnosis     Type 2 diabetes mellitus (H)     Obesity     Hyperlipidemia     Healthcare maintenance     Myasthenia gravis (H)     Rash and nonspecific skin eruption     Colon cancer screening     Lymphadenopathy     Malignant neoplasm of pharynx (H)     Dehydration     Dysphagia     Neutropenia (H)     Hypomagnesemia     Current medications reviewed as follows:  Current Outpatient Medications on File Prior to Visit   Medication Sig     atorvastatin (LIPITOR) 40 MG tablet TAKE 1 TABLET BY MOUTH EVERYDAY AT BEDTIME     blood glucose test strips Use 1 each As Directed as  needed. Dispense brand per patient's insurance at pharmacy discretion.     metFORMIN (GLUCOPHAGE) 500 MG tablet Take 2 tablets (1,000 mg total) by mouth 2 (two) times a day with meals.     pyridostigmine (MESTINON) 60 mg tablet Take 60 mg by mouth daily.      [DISCONTINUED] azaTHIOprine (IMURAN) 50 mg tablet Take 3 tablets (150 mg total) by mouth 2 (two) times a day. 3 tablets twice daily     [DISCONTINUED] glipiZIDE (GLUCOTROL XL) 2.5 MG 24 hr tablet TAKE 1 TABLET (2.5 MG TOTAL) BY MOUTH DAILY.     [DISCONTINUED] acetaminophen (TYLENOL) 325 mg/10.15 mL Soln Take 20.3 mL (650 mg total) by mouth every 4 (four) hours as needed.     [DISCONTINUED] alum/mag hydrox-simethicone-diphenhydramine-lidocaine (MAGIC MOUTHWASH) suspension Swish and spit 15 mL every 2 (two) hours as needed (Stomatitis).     [DISCONTINUED] lidocaine-prilocaine (EMLA) cream Apply topically as needed. Port     [DISCONTINUED] LORazepam (ATIVAN) 0.5 MG tablet Take 0.5 mg by mouth every 4 (four) hours as needed for anxiety. Nausea     [DISCONTINUED] OLANZapine (ZYPREXA) 5 MG tablet Take 5 mg by mouth at bedtime.     [DISCONTINUED] omeprazole (PRILOSEC) 20 MG capsule Take 20 mg by mouth daily before breakfast.     [DISCONTINUED] prochlorperazine (COMPAZINE) 10 MG tablet Take 10 mg by mouth every 6 (six) hours as needed for nausea.     [DISCONTINUED] sodium bicarbonate 325 MG tablet 1 tablet (325 mg total) by Enteral Tube route as needed (For clogged feeding tube). OTC product     [DISCONTINUED] viscous lidocaine HC 2 % Soln viscous solution Take 15 mL by mouth 4 (four) times a day before meals and at bedtime.     No current facility-administered medications on file prior to visit.      Social History     Tobacco Use   Smoking Status Never Smoker   Smokeless Tobacco Never Used   Tobacco Comment    no passive exposure     Social History     Social History Narrative     Not on file     Patient Care Team:  Israel Quiroz MD as PCP - General (Family  "Medicine)  Israel Quiroz MD as Assigned PCP  Mimi Asif, PharmD as Pharmacist (Pharmacist)  ROS  Full 10 system review including constitutional, respiratory, cardiac, gi, urinary, rheumatologic, neurologic, reproductive, dermatologic psychiatric is  performed  and is otherwise negative         Objective  Physical Exam  Vitals:    09/02/20 1431   BP: 104/65   Patient Site: Right Arm   Patient Position: Sitting   Cuff Size: Adult Regular   Pulse: 84   Resp: 16   Weight: 168 lb (76.2 kg)   Height: 5' 8\" (1.727 m)     Body mass index is 25.54 kg/m .  Thin, no acute distress.  Chest clear, cardiac exam normal.  Extremities without edema.  Still needs to step up onto the exam table with a little bit of effort.  Diagnostics:   Results for orders placed or performed in visit on 09/02/20   Glycosylated Hemoglobin A1c   Result Value Ref Range    Hemoglobin A1c 4.7 <=5.6 %           Please note: Voice recognition software was used in this dictation.  It may therefore contain typographical errors.      "

## 2021-06-12 NOTE — PROGRESS NOTES
Assessment/ Plan  Chronic Problems  Type 2 diabetes mellitus    DMII    Assessment of blood sugar control: Excellent  Lab Results   Component Value Date    HGBA1C 7.6 (H) 2016     Diabetes medication: Metformin 1000 twice daily  Statin medication (>40 or ^CVD Risk)-yes, atorvastatin 80 mg a day  At blood pressure goal (JNC 8 <140/90 all ages): Yes,  Lab Results   Component Value Date    MICROALBUR 1.2016     Ace/arb indicated and taking?  No  Low dose ASA? (indicated for most men> 50, most women > 60 if any other card RF yes, recommended taking it daily, not using daily but occasionally  Up to date with yearly dilated retina eval?  Yes    Plan continue metformin alone  Follow-up 3-6 months      Myasthenia gravis  Follow-up with neurology    Hyperlipidemia  Atorvastatin 80 mg, encourage aspirin use.  Encourage healthy diet and exercise      4. Colon cancer screening  Discussed  - Cologuard    Body mass index is 37.03 kg/(m^2).    Subjective  CC:  Chief Complaint   Patient presents with     med check     HPI:  Both parents recently   Started having weakness and lid legging, diagnosed with myasthenia gravis.  Following with Dr. Stephens.  Currently on 2 medications.  Initially on prednisone.  This caused blood sugars to be elevated.  Now on different immune suppressants.  Also on another medication.  meds working pretty well, but symptoms come back whenever he tries to wean off of them    Seeing shane on friday  DMII    Assessment of blood sugar control: Excellent  Lab Results   Component Value Date    HGBA1C 7.6 (H) 2016     Diabetes medication: Metformin 1000 twice daily  Statin medication (>40 or ^CVD Risk)-yes, atorvastatin 80 mg a day  At blood pressure goal (JNC 8 <140/90 all ages): Yes,  Lab Results   Component Value Date    MICROALBUR 1.03 2016     Ace/arb indicated and taking?  No  Low dose ASA? (indicated for most men> 50, most women > 60 if any other card RF yes, recommended  taking it daily, not using daily but occasionally  Up to date with yearly dilated retina eval?  Yes    Plan continue metformin  Follow-up 3-6 months    Testing blood sugars/ frequency?126-140  Taking 2 times per week on average          Taking 2 meds     PFSH:  Current medications reviewed as follows:  Current Outpatient Prescriptions on File Prior to Visit   Medication Sig     atorvastatin (LIPITOR) 80 MG tablet TAKE 1 TABLET EVERY DAY. Overdue for Clinic Visit & Fasting Labs. Please Schedule.     glipiZIDE (GLUCOTROL XL) 2.5 MG 24 hr tablet Take 1 tablet (2.5 mg total) by mouth daily. May ask for further refills at his 8/28 visit     [DISCONTINUED] metFORMIN (GLUCOPHAGE) 500 MG tablet 1 by mouth twice a day for 7 days, 2 every morning and one every afternoon for 7 days, then 2 by mouth twice a day indefinitely     [DISCONTINUED] metFORMIN (GLUCOPHAGE) 500 MG tablet TAKE 2 TABLETS (1,000 MG TOTAL) BY MOUTH 2 (TWO) TIMES A DAY WITH MEALS **NEEDS APPT FOR REFILLS**     glipiZIDE (GLUCOTROL) 2.5 MG 24 hr tablet Take 1 tablet (2.5 mg total) by mouth daily.     metFORMIN (GLUCOPHAGE) 500 MG tablet Take 2 tablets (1,000 mg total) by mouth 2 (two) times a day with meals.     No current facility-administered medications on file prior to visit.      Patient Active Problem List    Diagnosis Date Noted     Myasthenia gravis 08/28/2017     Overview Note:     [place]  On some sort of immune suppressant - no pred  Also on ach reuptake inhib       Health care maintenance 03/16/2016     Hyperlipidemia 01/26/2015     Type 2 diabetes mellitus 12/29/2014     Overview Note:     Diagnosed December 2014 with routine physical.  No symptoms.       Obesity 12/29/2014     History   Smoking Status     Never Smoker   Smokeless Tobacco     Not on file     Social History     Social History Narrative     Patient Care Team:  Israel Quiroz MD as PCP - General (Family Medicine)  ROS  Full 10 system review including constitutional,  "respiratory, cardiac, gi, urinary, rheumatologic, neurologic, reproductive, dermatologic psychiatric is  performed (via questionnaire) and is negative except constipation, erectile dysfunction      Objective  Physical Exam  Vitals:    08/28/17 0901   BP: 110/66   Pulse: 84   Temp: 97  F (36.1  C)   TempSrc: Oral   Weight: (!) 240 lb (108.9 kg)   Height: 5' 7.5\" (1.715 m)     Gen- alert, oriented/ appropriately responsive  HEENT- normal cephalic, atraumatic.   Chest- Normal inspiration and expiration.  Clear to ascultation.  No chest wall deformity or scar.  CV- Heart regular rate and rhythm, normal tones, no murmurs gallops or rubs.  Ext- appear well perfused, no edema  Skin- warm and dry, no visualized rash    Diagnostics  Results for orders placed or performed in visit on 08/28/17   Glycosylated Hemoglobin A1c   Result Value Ref Range    Hemoglobin A1c 6.6 (H) 3.5 - 6.0 %         Please note: Voice recognition software was used in this dictation.  It may therefore contain typographical errors.        "

## 2021-06-15 NOTE — TELEPHONE ENCOUNTER
RN cannot approve Refill Request    RN can NOT refill this medication PCP messaged that patient is overdue for Labs. Last office visit: 9/2/2020 Israel Quiroz MD Last Physical: Visit date not found Last MTM visit: Visit date not found Last visit same specialty: 9/2/2020 Israel Quiroz MD.  Next visit within 3 mo: Visit date not found  Next physical within 3 mo: Visit date not found      Gloria Sepulveda, [facility] Triage/Med Refill 2/27/2021    Requested Prescriptions   Pending Prescriptions Disp Refills     metFORMIN (GLUCOPHAGE) 500 MG tablet [Pharmacy Med Name: METFORMIN  MG TABLET] 360 tablet 3     Sig: TAKE 2 TABLETS (1,000 MG TOTAL) BY MOUTH 2 (TWO) TIMES A DAY WITH MEALS       Metformin Refill Protocol Failed - 2/27/2021  1:45 PM        Failed - LFT or AST or ALT in last 12 months     Albumin   Date Value Ref Range Status   01/27/2020 2.8 (L) 3.5 - 5.0 g/dL Final     Bilirubin, Total   Date Value Ref Range Status   01/27/2020 0.5 0.0 - 1.0 mg/dL Final     Alkaline Phosphatase   Date Value Ref Range Status   01/27/2020 94 45 - 120 U/L Final     AST   Date Value Ref Range Status   01/27/2020 73 (H) 0 - 40 U/L Final     ALT   Date Value Ref Range Status   01/27/2020 76 (H) 0 - 45 U/L Final     Protein, Total   Date Value Ref Range Status   01/27/2020 5.8 (L) 6.0 - 8.0 g/dL Final                Failed - GFR or Serum Creatinine in last 6 months     GFR MDRD Non Af Amer   Date Value Ref Range Status   01/27/2020 >60 >60 mL/min/1.73m2 Final     GFR MDRD Af Amer   Date Value Ref Range Status   01/27/2020 >60 >60 mL/min/1.73m2 Final             Failed - Microalbumin in last year      Microalbumin, Random Urine   Date Value Ref Range Status   11/05/2019 0.80 0.00 - 1.99 mg/dL Final                  Passed - Blood pressure in last 12 months     BP Readings from Last 1 Encounters:   09/02/20 104/65             Passed - Visit with PCP or prescribing provider visit in last 6 months or next 3  months     Last office visit with prescriber/PCP: 9/2/2020 OR same dept: 9/2/2020 Israel Quiroz MD OR same specialty: 9/2/2020 Israel Quiroz MD Last physical: Visit date not found Last MTM visit: Visit date not found         Next appt within 3 mo: Visit date not found  Next physical within 3 mo: Visit date not found  Prescriber OR PCP: Israel Quiroz MD  Last diagnosis associated with med order: There are no diagnoses linked to this encounter.   If protocol passes may refill for 12 months if within 3 months of last provider visit (or a total of 15 months).           Passed - A1C in last 6 months     Hemoglobin A1c   Date Value Ref Range Status   09/02/2020 4.7 <=5.6 % Final     Comment:     Normal <5.7% Prediabete 5.7-6.4% Diabletes 6.5% or higher - adopted from ADA consensus guidelines

## 2021-06-16 NOTE — PROGRESS NOTES
Assessment/ Plan  Problem List Items Addressed This Visit        Unprioritized    Type 2 diabetes mellitus (H) - Primary     A1c 4.8 on Metformin and 5 mg glipizide.  Will stop glipizide.,  Continue 500 mg Metformin.  Like her albumin, lipid, BMP, A1c done as well.         Relevant Orders    Glycosylated Hemoglobin A1c (Completed)    Microalbumin, Random Urine    Lipid Cascade FASTING    ALT (SGPT)    Basic Metabolic Panel    Hepatitis C Antibody (Anti-HCV)    HIV Antigen/Antibody Screening Cascade    Hyperlipidemia     Patient having some toe cramps, 40 mg of atorvastatin.    Lipid and ALT checked today  Instructed the patient to hold atorvastatin for 1 week, then start 10 mg atorvastatin.    Follow-up if toe cramps do not improve         Relevant Medications    atorvastatin (LIPITOR) 10 MG tablet    Healthcare maintenance     Patient has had Covid vaccines.  Due for colorectal cancer screening which was ordered.         Muscle cramps     Holding atorvastatin and then starting lower dose as described elsewhere.  Also check BMP and magnesium         Relevant Orders    CK Total    Magnesium        Subjective  CC:  Chief Complaint   Patient presents with     Diabetes     HPI:  Toe cramps as described.  Doing well.  Appetite returning.  Cancer care going well.  Follows up with ENT tomorrow  PFSH:  Patient Active Problem List   Diagnosis     Type 2 diabetes mellitus (H)     Obesity     Hyperlipidemia     Healthcare maintenance     Myasthenia gravis (H)     Rash and nonspecific skin eruption     Colon cancer screening     Lymphadenopathy     Malignant neoplasm of pharynx (H)     Dehydration     Dysphagia     Neutropenia (H)     Hypomagnesemia     Muscle cramps     Current medications reviewed as follows:  Current Outpatient Medications on File Prior to Visit   Medication Sig     blood glucose test strips Use 1 each As Directed as needed. Dispense brand per patient's insurance at pharmacy discretion.     levothyroxine  "(SYNTHROID, LEVOTHROID) 50 MCG tablet Take 50 mcg by mouth daily.     metFORMIN (GLUCOPHAGE) 500 MG tablet TAKE 2 TABLETS (1,000 MG TOTAL) BY MOUTH 2 (TWO) TIMES A DAY WITH MEALS     pyridostigmine (MESTINON) 60 mg tablet Take 60 mg by mouth daily.      [DISCONTINUED] atorvastatin (LIPITOR) 40 MG tablet TAKE 1 TABLET BY MOUTH EVERYDAY AT BEDTIME     [DISCONTINUED] glipiZIDE (GLUCOTROL XL) 2.5 MG 24 hr tablet TAKE 1 TABLET (2.5 MG TOTAL) BY MOUTH DAILY.     No current facility-administered medications on file prior to visit.      Social History     Tobacco Use   Smoking Status Never Smoker   Smokeless Tobacco Never Used   Tobacco Comment    no passive exposure     Social History     Social History Narrative     Not on file     Patient Care Team:  Israel Quiroz MD as PCP - General (Family Medicine)  Israel Quiroz MD as Assigned PCP  Mimi Asif, PharmD as Pharmacist (Pharmacist)  ROS  As above      Objective  Physical Exam  Vitals:    04/15/21 0749   BP: 119/70   Patient Site: Right Arm   Patient Position: Sitting   Cuff Size: Adult Regular   Pulse: 85   Resp: 14   Temp: 97.5  F (36.4  C)   TempSrc: Temporal   Weight: 188 lb (85.3 kg)   Height: 5' 8\" (1.727 m)     Body mass index is 28.59 kg/m .  Chest clear, cardiac exam normal.  No gross adenopathy in neck.  Good sensation lower extremities.  Diagnostics:   Results for orders placed or performed in visit on 04/15/21   Glycosylated Hemoglobin A1c   Result Value Ref Range    Hemoglobin A1c 4.8 <=5.6 %           Please note: Voice recognition software was used in this dictation.  It may therefore contain typographical errors.      "

## 2021-06-16 NOTE — PROGRESS NOTES
Assessment/ Plan  Problem List Items Addressed This Visit        Unprioritized    Type 2 diabetes mellitus - Primary     Assessment of blood sugar control: Excellent  Lab Results   Component Value Date    HGBA1C 6.5 (H) 03/05/2018     Diabetes medication: Metformin 1000 twice daily, glipizide 2.5 daily,   Statin medication (>40 or ^CVD Risk)-atorvastatin 80  At blood pressure goal (JNC 8 <140/90 all ages): Yes  Lab Results   Component Value Date    MICROALBUR 1.03 03/21/2016     Ace/arb indicated and taking?  No  Low dose ASA? (indicated for most men> 50, most women > 60 if any other card RF yes  Up to date with yearly dilated retina eval?  Did not discuss    Plan no change, follow-up 6 months             Relevant Medications    metFORMIN (GLUCOPHAGE) 500 MG tablet    glipiZIDE (GLUCOTROL XL) 2.5 MG 24 hr tablet    Other Relevant Orders    Glycosylated Hemoglobin A1c (Completed)    Hyperlipidemia     Atorvastatin 80, mindful of diet, check direct LDL and ALT         Relevant Orders    ALT (SGPT)    LDL Cholesterol, Direct    Health care maintenance     There was a problem with Cologuard when  Ordered.  Plans to repeat in the summer         Myasthenia gravis     Followed by neurology, on azathioprine             Subjective  CC:  Chief Complaint   Patient presents with     Follow-up     Diabetes check up     HPI:  DMII    Assessment of blood sugar control: Seems to be doing well  Lab Results   Component Value Date    HGBA1C 6.6 (H) 08/28/2017     Diabetes medications reviewed- compliance: Metformin 1000 twice daily, glipizide 2.5 daily    Additional diabetes objectives reviewed/ comment: yes  Statin medication (>40 or ^CVD Risk)-atorvastatin 80  Blood pressure goal (JNC 8 <140/90 all ages) at goal without medication  Lab Results   Component Value Date    MICROALBUR 1.03 03/21/2016     Ace/ARB if indicated-not indicated  Low dose ASA? (indicated for most men> 50, most women > 60 if any other card RF -takes 81 mg  aspirin  Yearly dilated retina evaluation-did not discuss    Blood Sugar Control  Testing blood sugars/ frequency? occasionall  AM   104-140      Lifestyle   -diet-mindful of this  -exercise/activity - not as much now, but otherwise      See discussion above about cologuard-  willing to do it but not right now because he is busy at work    PFSH:  Patient Active Problem List   Diagnosis     Type 2 diabetes mellitus     Obesity     Hyperlipidemia     Health care maintenance     Myasthenia gravis     Current medications reviewed as follows:  Current Outpatient Prescriptions on File Prior to Visit   Medication Sig     atorvastatin (LIPITOR) 80 MG tablet Take 1 tablet (80 mg total) by mouth daily.     metFORMIN (GLUCOPHAGE) 500 MG tablet Take 2 tablets (1,000 mg total) by mouth 2 (two) times a day with meals.     [DISCONTINUED] glipiZIDE (GLUCOTROL XL) 2.5 MG 24 hr tablet TAKE 1 TABLET BY MOUTH DAILY     [DISCONTINUED] metFORMIN (GLUCOPHAGE) 500 MG tablet TAKE 2 TABLETS (1,000 MG TOTAL) BY MOUTH 2 (TWO) TIMES A DAY WITH MEALS     glipiZIDE (GLUCOTROL) 2.5 MG 24 hr tablet Take 1 tablet (2.5 mg total) by mouth daily.     metFORMIN (GLUCOPHAGE) 500 MG tablet TAKE 2 TABLETS (1,000 MG TOTAL) BY MOUTH 2 (TWO) TIMES A DAY WITH MEALS **NEEDS APPT FOR REFILLS**     No current facility-administered medications on file prior to visit.      History   Smoking Status     Never Smoker   Smokeless Tobacco     Never Used     Social History     Social History Narrative     Patient Care Team:  Israel Quiroz MD as PCP - General (Family Medicine)  ROS  Full 10 system review including constitutional, respiratory, cardiac, gi, urinary, rheumatologic, neurologic, reproductive, dermatologic psychiatric is  performed (via questionnaire) and is negative except change in bowel habits, muscle cramps and difficulty with erections        Objective  Physical Exam  Vitals:    03/05/18 0902   BP: 118/68   Pulse: 68   Resp: 20   Temp: 97  F (36.1   C)   TempSrc: Oral   Weight: (!) 233 lb (105.7 kg)     Body mass index is 35.95 kg/(m^2).  Gen- alert, oriented/ appropriately responsive  HEENT- normal cephalic, atraumatic.   Chest- Normal inspiration and expiration.  Clear to ascultation.  No chest wall deformity or scar.  CV- Heart regular rate and rhythm, normal tones, no murmurs gallops or rubs.  Ext- appear well perfused, no edema  Skin- warm and dry, no visualized rash  Monofilament testing tenderness negative  Diagnostics:   Results for orders placed or performed in visit on 03/05/18   Glycosylated Hemoglobin A1c   Result Value Ref Range    Hemoglobin A1c 6.5 (H) 3.5 - 6.0 %           Please note: Voice recognition software was used in this dictation.  It may therefore contain typographical errors.

## 2021-06-18 NOTE — PATIENT INSTRUCTIONS - HE
Patient Instructions by Evie Luciano RN at 3/2/2020  9:00 AM     Author: Evie Luciano RN Service: -- Author Type: Registered Nurse    Filed: 3/2/2020  2:04 PM Encounter Date: 3/2/2020 Status: Addendum    : Evie Luciano RN (Registered Nurse)    Related Notes: Original Note by Evie Luciano RN (Registered Nurse) filed at 3/2/2020  2:04 PM         Patient Education     When You Need a Blood Transfusion (Adult)  A blood transfusion may be done when you have lost blood because of an injury or during surgery. It can also be done because of diseases or conditions that affect the blood. Blood is made up of several different parts (blood products). You may receive some or all of these blood products during a transfusion. Blood for transfusion is usually donated from another person (donor). Strict measures are taken to make sure that donated blood is safe before it's given to you. This sheet helps you understand how a blood transfusion is done. Your healthcare provider will discuss your condition with you and answer your questions.    The parts of blood  Blood can be broken down into different parts that perform special roles in the body. These parts include:    Red blood cells, which carry oxygen throughout the body.    Platelets, which help stop bleeding.    Plasma (the liquid part of blood), which carries red blood cells and platelets throughout the body. Plasma also helps platelets in stopping bleeding.  Where does donated blood come from?    Volunteer donors. These are people who donate their blood to help others in need of blood. Blood donation can take place at several places, including a hospital, blood bank, or during a blood drive.    Directed donation. If you need a blood transfusion during a planned surgery, family and friends can have their blood tested for compatibility and donate blood for you before the surgery. This needs to be done at least 7 day(s) in advance. This is  because the blood must be tested for safety.    Autologous donation. This is also called self-donation. For planned surgery, you can donate your own blood starting up to 6 weeks before surgery.  Are blood transfusions safe?  Donated blood is tested and processed to make sure that the blood is safe:    The health and medical history of each donor is carefully screened. If a person is considered high-risk for infection or problems, he or she isn't accepted as a blood donor.    Donated blood is tested for infections such as hepatitis, syphilis, and HIV (the virus that causes AIDS). If the tested blood is found to be unsafe, it's destroyed.    Blood is divided into four types: A, B, AB, and O. Blood also has Rh types: positive (+) and negative (-). You can only receive blood products that are compatible with (match) your blood type. A sample of your blood is tested for compatibility with donated blood. This is done before blood products are prepared for a transfusion.  How is a blood transfusion done?  A blood transfusion takes place in a blood center, infusion center, hospital room, or operating room. Your healthcare provider will discuss the blood transfusion with you before it's done. You'll need to give permission for the blood transfusion by signing a consent form.    Two healthcare providers confirm your identity. They also confirm that they have the correct blood product(s) for you.    An intravenous (IV) line is placed in a vein if you do not already have an IV.    The blood product comes in a plastic bag that is hung on an IV pole. The blood product flows from the bag into your IV line. The IV line may be connected to a pump, which controls the transfusion rate. You may receive more than one kind of blood product through the IV.    Your vital signs (blood pressure, heart rate, respiratory rate, and temperature) are checked throughout the transfusion. This is to make sure you are not having a reaction to the  blood product.    The IV line may be removed once the transfusion is complete.  Possible risks and complications of blood transfusions  Most transfusions are problem free. In some cases, reactions occur. These can happen within seconds to minutes during the transfusion or a week to a few months after the transfusion. Call your doctor or nurse right away if you have any of the signs or symptoms in the table below during or after a transfusion:  Reaction Timing Symptoms   Allergic reaction (mild)   Within seconds to minutes during the transfusion    Up to 24 hours after the transfusion Hives or red welts on the skin, mild itching, rash, localized swelling, flushing (red face), wheezing, shortness of breath, or stridor (high-pitched noise or sound)   Anaphylactic reaction   Within seconds to minutes during the transfusion    Up to 24 hours after the transfusion Shortness of breath, flushing (red face), wheezing, labored (working hard) breathing, low blood pressure, localized swelling, chest tightness, or cramps   Febrile nonhemolytic reaction   Within minutes to hours during the transfusion    Within a few hours to 24 hours after the transfusion Fever (increase of 1  C or higher), chills, flushing (red face), nausea, headache, minor discomfort, or mild shortness of breath   Acute immune hemolytic reaction   Within minutes during the transfusion    Up to 24 hours after the transfusion Fever, red or brown urine, back pain, fast heart rate (tachycardia), abdominal pain, low blood pressure, feeling anxious, chills, chest pain, nausea, or fainting spells   Transfusion-related acute lung injury (TRALI)   Within 1 to 2 hours during the transfusion    Up to 6 hours after the transfusion Shortness of breath, trouble breathing, low blood pressure, fever, pulmonary edema   Transfusion-associated circulatory overload   Near the end of the transfusion    Within 6 hours after the transfusion Shortness of breath, fast heart rate  "(tachycardia), problems breathing when lying on back, abnormal blood pressure   Post-transfusion purpura (PUP)   Within 1 week    Up to 48 days after the transfusion Purple spots on skin; nose bleed; bleeding from the urinary tract, abdomen, colon, or rectum; fever; or chills         \"Delayed\" transfusion-related acute lung injury (TRALI)   Within 72 hours (3 days) after the transfusion \"Sudden\" onset of respiratory distress or trouble breathing   \"Delayed\" hemolytic reaction   Within 3 to 7 days    Up to weeks after the transfusion Low-grade fever, mild jaundice (yellowing of the skin and whites of the eyes), decrease in hematocrit, chills, chest pain, back pain, nausea   Date Last Reviewed: 12/1/2016 2000-2017 [employer]. [address] [place] [address], [place] [id number]. All rights reserved. This information is not intended as a substitute for professional medical care. Always follow your healthcare professional's instructions.                "

## 2021-06-18 NOTE — PATIENT INSTRUCTIONS - HE
Patient Instructions by Santa Davis RN at 3/24/2020  8:30 AM     Author: Santa Davis RN Service: -- Author Type: Registered Nurse    Filed: 3/24/2020 11:40 AM Encounter Date: 3/24/2020 Status: Signed    : Santa Davis RN (Registered Nurse)       Call with any concPatient Education     When You Need a Blood Transfusion (Adult)  A blood transfusion may be done when you have lost blood because of an injury or during surgery. It can also be done because of diseases or conditions that affect the blood. Blood is made up of several different parts (blood products). You may receive some or all of these blood products during a transfusion. Blood for transfusion is usually donated from another person (donor). Strict measures are taken to make sure that donated blood is safe before it's given to you. This sheet helps you understand how a blood transfusion is done. Your healthcare provider will discuss your condition with you and answer your questions.    The parts of blood  Blood can be broken down into different parts that perform special roles in the body. These parts include:    Red blood cells, which carry oxygen throughout the body.    Platelets, which help stop bleeding.    Plasma (the liquid part of blood), which carries red blood cells and platelets throughout the body. Plasma also helps platelets in stopping bleeding.  Where does donated blood come from?    Volunteer donors. These are people who donate their blood to help others in need of blood. Blood donation can take place at several places, including a hospital, blood bank, or during a blood drive.    Directed donation. If you need a blood transfusion during a planned surgery, family and friends can have their blood tested for compatibility and donate blood for you before the surgery. This needs to be done at least 7 day(s) in advance. This is because the blood must be tested for safety.    Autologous donation. This is also called self-donation.  For planned surgery, you can donate your own blood starting up to 6 weeks before surgery.  Are blood transfusions safe?  Donated blood is tested and processed to make sure that the blood is safe:    The health and medical history of each donor is carefully screened. If a person is considered high-risk for infection or problems, he or she isn't accepted as a blood donor.    Donated blood is tested for infections such as hepatitis, syphilis, and HIV (the virus that causes AIDS). If the tested blood is found to be unsafe, it's destroyed.    Blood is divided into four types: A, B, AB, and O. Blood also has Rh types: positive (+) and negative (-). You can only receive blood products that are compatible with (match) your blood type. A sample of your blood is tested for compatibility with donated blood. This is done before blood products are prepared for a transfusion.  How is a blood transfusion done?  A blood transfusion takes place in a blood center, infusion center, hospital room, or operating room. Your healthcare provider will discuss the blood transfusion with you before it's done. You'll need to give permission for the blood transfusion by signing a consent form.    Two healthcare providers confirm your identity. They also confirm that they have the correct blood product(s) for you.    An intravenous (IV) line is placed in a vein if you do not already have an IV.    The blood product comes in a plastic bag that is hung on an IV pole. The blood product flows from the bag into your IV line. The IV line may be connected to a pump, which controls the transfusion rate. You may receive more than one kind of blood product through the IV.    Your vital signs (blood pressure, heart rate, respiratory rate, and temperature) are checked throughout the transfusion. This is to make sure you are not having a reaction to the blood product.    The IV line may be removed once the transfusion is complete.  Possible risks and  complications of blood transfusions  Most transfusions are problem free. In some cases, reactions occur. These can happen within seconds to minutes during the transfusion or a week to a few months after the transfusion. Call your doctor or nurse right away if you have any of the signs or symptoms in the table below during or after a transfusion:  Reaction Timing Symptoms   Allergic reaction (mild)   Within seconds to minutes during the transfusion    Up to 24 hours after the transfusion Hives or red welts on the skin, mild itching, rash, localized swelling, flushing (red face), wheezing, shortness of breath, or stridor (high-pitched noise or sound)   Anaphylactic reaction   Within seconds to minutes during the transfusion    Up to 24 hours after the transfusion Shortness of breath, flushing (red face), wheezing, labored (working hard) breathing, low blood pressure, localized swelling, chest tightness, or cramps   Febrile nonhemolytic reaction   Within minutes to hours during the transfusion    Within a few hours to 24 hours after the transfusion Fever (increase of 1  C or higher), chills, flushing (red face), nausea, headache, minor discomfort, or mild shortness of breath   Acute immune hemolytic reaction   Within minutes during the transfusion    Up to 24 hours after the transfusion Fever, red or brown urine, back pain, fast heart rate (tachycardia), abdominal pain, low blood pressure, feeling anxious, chills, chest pain, nausea, or fainting spells   Transfusion-related acute lung injury (TRALI)   Within 1 to 2 hours during the transfusion    Up to 6 hours after the transfusion Shortness of breath, trouble breathing, low blood pressure, fever, pulmonary edema   Transfusion-associated circulatory overload   Near the end of the transfusion    Within 6 hours after the transfusion Shortness of breath, fast heart rate (tachycardia), problems breathing when lying on back, abnormal blood pressure   Post-transfusion  "purpura (PUP)   Within 1 week    Up to 48 days after the transfusion Purple spots on skin; nose bleed; bleeding from the urinary tract, abdomen, colon, or rectum; fever; or chills         \"Delayed\" transfusion-related acute lung injury (TRALI)   Within 72 hours (3 days) after the transfusion \"Sudden\" onset of respiratory distress or trouble breathing   \"Delayed\" hemolytic reaction   Within 3 to 7 days    Up to weeks after the transfusion Low-grade fever, mild jaundice (yellowing of the skin and whites of the eyes), decrease in hematocrit, chills, chest pain, back pain, nausea   Date Last Reviewed: 12/1/2016 2000-2017 [employer]. [address] [place] [address], [place] [address]. All rights reserved. This information is not intended as a substitute for professional medical care. Always follow your healthcare professional's instructions.         parag, alexander Davis       "

## 2021-06-19 NOTE — LETTER
Letter by Israel Quiroz MD at      Author: Israel Quiroz MD Service: -- Author Type: --    Filed:  Encounter Date: 11/7/2019 Status: Signed         Cj Morales  [address] [place] [address]           November 7, 2019         Dear Mr. Morales,    Below are the results from your recent visit:    Resulted Orders   Glycosylated Hemoglobin A1c   Result Value Ref Range    Hemoglobin A1c 6.1 (H) 3.5 - 6.0 %   ALT (SGPT)   Result Value Ref Range    ALT 39 0 - 45 U/L   Basic Metabolic Panel   Result Value Ref Range    Sodium 142 136 - 145 mmol/L    Potassium 4.5 3.5 - 5.0 mmol/L    Chloride 108 (H) 98 - 107 mmol/L    CO2 29 22 - 31 mmol/L    Anion Gap, Calculation 5 5 - 18 mmol/L    Glucose 146 (H) 70 - 125 mg/dL    Calcium 9.2 8.5 - 10.5 mg/dL    BUN 15 8 - 22 mg/dL    Creatinine 0.68 (L) 0.70 - 1.30 mg/dL    GFR MDRD Af Amer >60 >60 mL/min/1.73m2    GFR MDRD Non Af Amer >60 >60 mL/min/1.73m2    Narrative    Fasting Glucose reference range is 70-99 mg/dL per  American Diabetes Association (ADA) guidelines.   Custom LDL Cholesterol, Direct   Result Value Ref Range    Direct LDL 31 <=129 mg/dl   Microalbumin, Random Urine   Result Value Ref Range    Microalbumin, Random Urine 0.80 0.00 - 1.99 mg/dL    Creatinine, Urine 174.2 mg/dL    Microalbumin/Creatinine Ratio Random Urine 4.6 <=19.9 mg/g    Narrative    Microalbumin, Random Urine  <2.0 mg/dL . . . . . . . . Normal  3.0-30.0 mg/dL . . . . . . Microalbuminuria  >30.0 mg/dL . . . . . .  . Clinical Proteinuria    Microalbumin/Creatinine Ratio, Random Urine  <20 mg/g . . . . .. . . . Normal   mg/g . . . . . . . Microalbuminuria  >300 mg/g . . . . . . . . Clinical Proteinuria       HM1 (CBC with Diff)   Result Value Ref Range    WBC 3.9 (L) 4.0 - 11.0 thou/uL    RBC 3.39 (L) 4.40 - 6.20 mill/uL    Hemoglobin 12.5 (L) 14.0 - 18.0 g/dL    Hematocrit 36.5 (L) 40.0 - 54.0 %     (H) 80 - 100 fL    MCH 36.9 (H) 27.0 - 34.0 pg     MCHC 34.2 32.0 - 36.0 g/dL    RDW 15.4 (H) 11.0 - 14.5 %    Platelets 140 140 - 440 thou/uL    MPV 10.0 8.5 - 12.5 fL    Neutrophils % 84 (H) 50 - 70 %    Lymphocytes % 10 (L) 20 - 40 %    Monocytes % 4 2 - 10 %    Eosinophils % 2 0 - 6 %    Basophils % 0 0 - 2 %    Neutrophils Absolute 3.3 2.0 - 7.7 thou/uL    Lymphocytes Absolute 0.4 (L) 0.8 - 4.4 thou/uL    Monocytes Absolute 0.2 0.0 - 0.9 thou/uL    Eosinophils Absolute 0.1 0.0 - 0.4 thou/uL    Basophils Absolute 0.0 0.0 - 0.2 thou/uL       Albert, mildly blood cell count abnormalities are likely related to the immunosuppression.  I do not see any big problems here.      Please call with questions or contact us using MyChart.    Sincerely,        Electronically signed by Israel Quiroz MD

## 2021-06-20 NOTE — LETTER
Letter by Janna Potter RN at      Author: Janna Potter RN Service: -- Author Type: --    Filed:  Encounter Date: 6/25/2020 Status: (Other)       6/25/2020        [address]j [address] [place] [address]    This letter provides a written record that you were tested for COVID-19 on 6/24/20.     Your result was negative. This means that we didnt find the virus that causes COVID-19 in your sample. A test may show negative when you do actually have the virus. This can happen when the virus is in the early stages of infection, before you feel illness symptoms.    If you have symptoms   Stay home and away from others (self-isolate) until you meet ALL of the guidelines below:    Youve had no fever--and no medicine that reduces fever--for 3 full days (72 hours). And ?    Your other symptoms have gotten better. For example, your cough or breathing has improved. And?    At least 10 days have passed since your symptoms started.    During this time:    Stay home. Dont go to work, school or anywhere else.     Stay in your own room, including for meals. Use your own bathroom if you can.    Stay away from others in your home. No hugging, kissing or shaking hands. No visitors.    Clean high touch surfaces often (doorknobs, counters, handles, etc.). Use a household cleaning spray or wipes. You can find a full list on the EPA website at www.epa.gov/pesticide-registration/list-n-disinfectants-use-against-sars-cov-2.    Cover your mouth and nose with a mask, tissue or washcloth to avoid spreading germs.    Wash your hands and face often with soap and water.    Going back to work  Check with your employer for any guidelines to follow for going back to work.    Employers: This document serves as formal notice that your employee tested negative for COVID-19, as of the testing date shown above.

## 2021-06-21 NOTE — PROGRESS NOTES
Assessment/ Plan  Problem List Items Addressed This Visit        Unprioritized    Type 2 diabetes mellitus (H) - Primary     Complications of Diabetes:  none  Assessment of blood sugar control: Excellent, almost too excellent  Lab Results   Component Value Date    HGBA1C 5.6 11/27/2018     Diabetes medication: Metformin 1000 twice daily, glipizide XL 2.5,   Statin medication (>40 or ^CVD Risk)-atorvastatin 80  At blood pressure goal (JNC 8 <140/90 all ages): Yes  Lab Results   Component Value Date    MICROALBUR 1.03 03/21/2016     Ace/arb indicated and taking?  No, microalbumin ordered  Low dose ASA? (indicated for most men> 50, most women > 60 if any other card RF yes  Up to date with yearly dilated retina eval?  No, he is due for this but can make his own appointment.    Plan: We will contact him, plan to stop glipizide with his weight loss and low A1c  Follow-up: 6 months           Relevant Orders    Glycosylated Hemoglobin A1c (Completed)    Microalbumin, Random Urine    Obesity     Patient has lost 10 pounds since the last visit.  Praised him for this         Hyperlipidemia    Relevant Medications    atorvastatin (LIPITOR) 40 MG tablet    Healthcare maintenance     Colonoscopy ordered for colon cancer screening, normal risk         Relevant Orders    Ambulatory referral for Colonoscopy    Myasthenia gravis (H)     Condition followed by: Dr. Stephens, [facility]  Patient reports fair  control of the condition  Next visit (approx): Yearly, as I recall he sees him early next year  Comment: none continues to use azathioprine and pyridostigmine           Rash and nonspecific skin eruption     Ulcer with erythematous border left coccyx, now getting smaller.  Etiology unclear.  Discussed considerations including infection, on known trauma, drug eruption, neoplasm..  Since is getting smaller think it is okay to watch, apply antibiotic ointment.  If not resolved in 6 weeks, or stops getting  smaller or at any time gets larger, needs to be seen and probably this should be biopsied.  Patient voiced understanding         Colon cancer screening    Relevant Orders    Ambulatory referral for Colonoscopy        Subjective  CC:  Chief Complaint   Patient presents with     Follow-up     DM - No concerns      HPI:  DMII  Diabetes complications:  none  Lab Results   Component Value Date    HGBA1C 6.5 (H) 03/05/2018     Diabetes medications reviewed- compliance: Metformin 1000 twice daily, glipizide XL 2.5    Additional diabetes objectives reviewed   Statin medication (>40 or ^CVD Risk) atorvastatin 80  Blood pressure goal (JNC 8 <140/90 all ages)-yes  BP Readings from Last 3 Encounters:   11/27/18 102/54   03/05/18 118/68   08/28/17 110/66     Lab Results   Component Value Date    MICROALBUR 1.03 03/21/2016     Ace/ARB if indicated-to this point not indicated, due for microalbumin check which was ordered today  Low dose ASA? (indicated for most men> 50, most women > 60 if any other card RF yes  Yearly dilated retina evaluation -reminder    Blood Sugar Control  Testing blood sugars/ frequency? Occasionally checked and remains in good control    Hypoglycemia?  None    Comments (diet/exercise, working with diabetes educator etc):   Wt Readings from Last 3 Encounters:   11/27/18 (!) 223 lb 4 oz (101.3 kg)   03/05/18 (!) 233 lb (105.7 kg)   08/28/17 (!) 240 lb (108.9 kg)       Sore on coccyx  3 weeks ago then dissappeared  Big black eschar  No recollection of trauma  Now seems to be getting smaller              PFSH:  Patient Active Problem List   Diagnosis     Type 2 diabetes mellitus (H)     Obesity     Hyperlipidemia     Healthcare maintenance     Myasthenia gravis (H)     Rash and nonspecific skin eruption     Colon cancer screening     Current medications reviewed as follows:  Current Outpatient Medications on File Prior to Visit   Medication Sig     atorvastatin (LIPITOR) 80 MG tablet TAKE 1 TABLET (80 MG TOTAL)  BY MOUTH DAILY.     azaTHIOprine (IMURAN) 50 mg tablet Take 50 mg by mouth daily. Use as directed     glipiZIDE (GLUCOTROL XL) 2.5 MG 24 hr tablet Take 1 tablet (2.5 mg total) by mouth daily.     metFORMIN (GLUCOPHAGE) 500 MG tablet Take 2 tablets (1,000 mg total) by mouth 2 (two) times a day with meals.     pyridostigmine (MESTINON) 60 mg tablet Take 60 mg by mouth. Use as directed     glipiZIDE (GLUCOTROL) 2.5 MG 24 hr tablet Take 1 tablet (2.5 mg total) by mouth daily.     metFORMIN (GLUCOPHAGE) 500 MG tablet TAKE 2 TABLETS (1,000 MG TOTAL) BY MOUTH 2 (TWO) TIMES A DAY WITH MEALS **NEEDS APPT FOR REFILLS**     metFORMIN (GLUCOPHAGE) 500 MG tablet TAKE 2 TABLETS (1,000 MG TOTAL) BY MOUTH 2 (TWO) TIMES A DAY WITH MEALS     No current facility-administered medications on file prior to visit.      Social History     Tobacco Use   Smoking Status Never Smoker   Smokeless Tobacco Never Used     Social History     Social History Narrative     Not on file     Patient Care Team:  Israel Quiroz MD as PCP - General (Family Medicine)  ROS  Full 10 system review including constitutional, respiratory, cardiac, gi, urinary, rheumatologic, neurologic, reproductive, dermatologic psychiatric is  performed (via questionnaire) and is negative        Objective  Physical Exam  Vitals:    11/27/18 0756   BP: 102/54   Patient Site: Left Arm   Patient Position: Sitting   Cuff Size: Adult Large   Pulse: 80   Resp: 20   Weight: (!) 223 lb 4 oz (101.3 kg)     Body mass index is 34.45 kg/m .  Gen- alert, oriented x3  No acute distress     No chest wall deformity or scar.  CV- Heart regular rate and rhythm  normal tones   no murmurs   No gallops or rubs.  Ext- warm and dry   no edema  Skin-ulcerated plaque left coccyx, measuring about 2-1/2 cm in diameter, erythematous/pinkish borders, distinct edge.  Black eschar over a portion of this.  No significant induration, this is dry  Foot monofilament test performed-  Sensation intact       Diagnostics:   Results for orders placed or performed in visit on 11/27/18   Glycosylated Hemoglobin A1c   Result Value Ref Range    Hemoglobin A1c 5.6 3.5 - 6.0 %           Please note: Voice recognition software was used in this dictation.  It may therefore contain typographical errors.

## 2021-06-22 NOTE — PROGRESS NOTES
[facility] clinic EXAM note      Chief Complaint   Patient presents with     Numbness     L leg.  Onset 12/24/18       Assessment & Plan    Problem List Items Addressed This Visit     None      Visit Diagnoses     Numbness and tingling    -  Primary: unclear cause. Numbing sensation starts around left buttocks down the side of lateral and anterior thigh then to knee and all the way below the knee. A little bit in the right foot. However sensation is intact, strength intact 5/5 in lower ext in major muscle groups, patellar and achilles reflexes +2/4 bilaterally. He denies any issues with motor function or sensation too. numbness extends to rectum but no issues with bladder or bowel function. The distribution is not consistent with a nerve distribution and no know injury. Also not consistent with typical vascular symptoms.  Diabetes well controlled although could still have neuropathy but not quite consistent character of neuropathy either. Discussed starting with labs below to rule out some possible causes. He is on metformin and it could be due to B12 deficiency. Reviewed reasons to call or go to ED or return sooner including symptoms worsening, becoming painful, loss of motor function or swelling.     Relevant Orders    Vitamin B12 (Completed)    Folate, Serum (Completed)    Thyroid Stimulating Hormone (TSH) (Completed)    HM1(CBC and Differential) (Completed)    Comprehensive Metabolic Panel (Completed)    CK Total (Completed)    HM1 (CBC with Diff) (Completed)          History    Cj Morales is a 53 y.o.  male who presents for the following issues:    Numbness. feels like wet clothes on. Almost like frost bite  Toes cramp. Almost feels like big toe can't feel the other ones.   Diabetes on metformin. a1c is 6. Well controlled.     Some days worse than others.   Around the hip straight on down .  sometimes affecting the rectal area. Genital area. With wiping. Tingling numbness.   No problems with bowel  or bladder control.    Some days hard to function  Doesn't affect gait or ability to walk.  No weakness  No calf cramping or need to stop walking.  Not a smoker    At night - difficulties falling alseep. So if anything is touching it it feels odd.   Feels odd when he puts the shoe on.   almost feels like its swollen but its not swollen.   A tiny bit on the right as well but mostly on the left.   No injury. No CP, no SOB    Hx of myathesia gravis- shaking in the hands given medication. Worsening symptoms?  Hasn't fallen.     mEDICATIONS    Current Outpatient Medications on File Prior to Visit   Medication Sig Dispense Refill     azaTHIOprine (IMURAN) 50 mg tablet Take 50 mg by mouth daily. Use as directed       glipiZIDE (GLUCOTROL XL) 2.5 MG 24 hr tablet Take 1 tablet (2.5 mg total) by mouth daily. 90 tablet 3     metFORMIN (GLUCOPHAGE) 500 MG tablet Take 2 tablets (1,000 mg total) by mouth 2 (two) times a day with meals. 120 tablet 3     pyridostigmine (MESTINON) 60 mg tablet Take 60 mg by mouth. Use as directed       atorvastatin (LIPITOR) 40 MG tablet Take 1 tablet (40 mg total) by mouth at bedtime. 30 tablet 6     atorvastatin (LIPITOR) 80 MG tablet TAKE 1 TABLET (80 MG TOTAL) BY MOUTH DAILY. 90 tablet 2     No current facility-administered medications on file prior to visit.        Pertinent past medical, surgical, social and family history reviewed and updated in [facility].    Social History     Socioeconomic History     Marital status:      Spouse name: Not on file     Number of children: Not on file     Years of education: Not on file     Highest education level: Not on file   Social Needs     Financial resource strain: Not on file     Food insecurity - worry: Not on file     Food insecurity - inability: Not on file     Transportation needs - medical: Not on file     Transportation needs - non-medical: Not on file   Occupational History     Not on file   Tobacco Use     Smoking status: Never Smoker      "Smokeless tobacco: Never Used     Tobacco comment: no passive exposure   Substance and Sexual Activity     Alcohol use: Not on file     Drug use: Not on file     Sexual activity: Not on file   Other Topics Concern     Not on file   Social History Narrative     Not on file         Review of systems     Pertinent Positives and negatives in HPI.     Physical Exam    /66 (Patient Site: Left Arm, Patient Position: Sitting, Cuff Size: Adult Regular)   Pulse 71   Temp 97.7  F (36.5  C) (Oral)   Ht 5' 7.5\" (1.715 m)   Wt (!) 223 lb 8 oz (101.4 kg)   SpO2 96%   BMI 34.49 kg/m    GEN:  53 y.o. male sitting comfortably in no apparent distress.   HEENT: EOMI, no scleral icterus, buccal mucosa moist  CHEST/LUNG: No respiratory distress, good air flow to bases, CTAB   CV: RRR, S1, S2 normal; no murmurs, rubs or gallops. No edema.   MSK:  Numbing sensation starts around left buttocks down the side of lateral and anterior thigh then to knee and all the way below the knee. A little bit in the right foot. However sensation is intact, strength intact 5/5 in lower ext in major muscle groups, patellar and achilles reflexes +2/4 bilaterally.   SKIN: warm, dry, no rashes or lesions  NEURO: Gait normal, coordination intact  PSYCH:  Mood and affect appropriate      At the end of the encounter, I discussed results, diagnosis, medications. Discussed red flags for immediate return to clinic/ER, as well as indications for follow up if no improvement. Patient and/or caregiver understood and agreed to plan. Patient was stable for discharge.      Silvia Lino     Addendum:   Discussed case with Dr. Quiroz, PCP. Will start with Lumbar MRI and if normal will order EMGs of LE.     "

## 2021-06-23 NOTE — PROGRESS NOTES
Just came to try to find you- and you must be out    Lets talk next week    My general approach to w/u of these localized things.  1a- make sure there is no hyperreflexia   1b) Consider myralgia parasthetica (but below the knee and distrobution too wide)  2) if diagnostic test needed, what is the likelihood of structural back problem- if high check mri and if low EMG-    I would doubt a borderline low B12 would cause localized problem

## 2021-06-24 NOTE — TELEPHONE ENCOUNTER
Refill Approved    Rx renewed per Medication Renewal Policy. Medication was last renewed on 3/2/16.    Ov: 1/7/19    Evie Sanabria, [facility] Triage/Med Refill 3/13/2019     Requested Prescriptions   Pending Prescriptions Disp Refills     metFORMIN (GLUCOPHAGE) 500 MG tablet [Pharmacy Med Name: METFORMIN  MG TABLET] 360 tablet 3     Sig: TAKE 2 TABLETS (1,000 MG TOTAL) BY MOUTH 2 (TWO) TIMES A DAY WITH MEALS    Metformin Refill Protocol Passed - 3/8/2019  2:04 AM       Passed - Blood pressure in last 12 months    BP Readings from Last 1 Encounters:   01/07/19 134/66            Passed - LFT or AST or ALT in last 12 months    Albumin   Date Value Ref Range Status   01/07/2019 3.8 3.5 - 5.0 g/dL Final     Bilirubin, Total   Date Value Ref Range Status   01/07/2019 0.9 0.0 - 1.0 mg/dL Final     Alkaline Phosphatase   Date Value Ref Range Status   01/07/2019 99 45 - 120 U/L Final     AST   Date Value Ref Range Status   01/07/2019 35 0 - 40 U/L Final     ALT   Date Value Ref Range Status   01/07/2019 36 0 - 45 U/L Final     Protein, Total   Date Value Ref Range Status   01/07/2019 7.1 6.0 - 8.0 g/dL Final               Passed - GFR or Serum Creatinine in last 6 months    GFR MDRD Non Af Amer   Date Value Ref Range Status   01/07/2019 >60 >60 mL/min/1.73m2 Final     GFR MDRD Af Amer   Date Value Ref Range Status   01/07/2019 >60 >60 mL/min/1.73m2 Final            Passed - Visit with PCP or prescribing provider visit in last 6 months or next 3 months    Last office visit with prescriber/PCP: 11/27/2018 OR same dept: 1/7/2019 Silvia Lino DO OR same specialty: 1/7/2019 Silvia Lino DO Last physical: Visit date not found Last MTM visit: Visit date not found         Next appt within 3 mo: Visit date not found  Next physical within 3 mo: Visit date not found  Prescriber OR PCP: Israel Quiroz MD  Last diagnosis associated with med order: 1. Type 2 diabetes mellitus (H)  - metFORMIN (GLUCOPHAGE) 500 MG  tablet [Pharmacy Med Name: METFORMIN  MG TABLET]; TAKE 2 TABLETS (1,000 MG TOTAL) BY MOUTH 2 (TWO) TIMES A DAY WITH MEALS  Dispense: 360 tablet; Refill: 3     If protocol passes may refill for 12 months if within 3 months of last provider visit (or a total of 15 months).          Passed - A1C in last 6 months    Hemoglobin A1c   Date Value Ref Range Status   11/27/2018 5.6 3.5 - 6.0 % Final              Passed - Microalbumin in last year     Microalbumin, Random Urine   Date Value Ref Range Status   11/27/2018 1.24 0.00 - 1.99 mg/dL Final

## 2021-06-28 NOTE — PROGRESS NOTES
Progress Notes by Kate Montanez NP at 2/7/2020  1:30 PM     Author: Kate Montanez NP Service: Interventional Radiology Author Type: Nurse Practitioner    Filed: 2/7/2020  1:11 PM Date of Service: 2/7/2020  1:30 PM Status: Signed    : Kate Montanez NP (Nurse Practitioner)         Interventional Radiology  2/7/2020    Patient presents for G tube site check due to leaking, water flushes coming out the track.  He is s/p G tube placement 1/24/20.  Upon evaluation of the tube, the disc was noted to be not securely snug to the skin.  I gently pulled up on the tube for traction in order to snug the disc down, and the gastrostomy tube pulled completely out.  The balloon was found to be deflated.      ASSESSMENT/PLAN:   Gastrostomy tube fell out.    Will plan for urgent IR gastrostomy tube replacement with local anesthesia - hopefully we can get a tube back in the existing track.      Case discussed with our IR physician Dr. Freitas.      Procedure, risk/benefits reviewed with pt/family. All questions answered. Consent obtained. OK to proceed with above radiology procedure.     Kate Montanez CNP  Interventional Radiology

## 2021-07-01 NOTE — CONSULTS
Consults by Martina Gomez Chi, PA-C at 2020 11:30 AM     Author: Martina Gomez Chi, PA-C Service: Interventional Radiology Author Type: Physician Assistant    Filed: 2020 12:11 PM Date of Service: 2020 11:30 AM Status: Signed    : Martina Gomez Chi, PA-C (Physician Assistant)         Interventional Radiology - Consultation Note:  Inpatient - [facility]: Interventional Radiology   [phone]  2020    Reason for Consult:  [facility] Request Evaluation of Prior G tube site   Requesting Provider:  Yen Oshea PA-C    HPI:  Cj Morales is a 55 y.o. old male with history of right tongue base SCC, with history of G tube placement on 2020 s/p G Tube removal on 2020 per request of team now presents with small amount of granulation tissue around prior tube site.  Reports it initially leaked through tract but now there has been very minimal leaking. Was referred here by [facility].  He thought it was best to have it looked at.       Denies any abdominal pain, fevers chills.     IMAGIN2020:  G Tube Replacement:  EXAM: G TUBE REPLACEMENT  LOCATION: [facility]  DATE/TIME: 2020 2:08 PM     INDICATION: G-tube just pulled out, balloon was found to be deflated  COMPARISON: None.     FINDINGS: G-tube was replaced with new G-tube. Tube confirmed in satisfactory position in the stomach.     18 Japanese ENFIT G-tube     IMPRESSION:   CONCLUSION:  1.  New G-tube replaced without complication.    2020:  G Tube Placement:  PROCEDURE:   A nasogastric tube was placed into the stomach, confirmed by fluoroscopy. The upper abdomen was prepped and draped in the usual sterile fashion followed by local anesthesia with 1% Xylocaine. The stomach was insufflated with air. Using fluoroscopic   guidance for needle placement, two T-fasteners were deployed into the gastric lumen for gastropexy. An 18 gauge needle was advanced into the stomach. Under fluoroscopy, an  Amplatz Superstiff wire was advanced into the gastric lumen followed by placement   of the dilator peel-away sheath. An 18 French gastrostomy tube was then placed into the gastric lumen. The balloon was inflated with 10 mL of saline. The peel-away sheath was removed. Contrast was injected through the G-tube, and a digital spot image was   obtained.     FINDINGS:   Successful placement of new 18 French percutaneous gastrostomy tube.     IMPRESSION:   Successful placement of new 18 French percutaneous gastrostomy tube.    NPO Status:  N/A  Anticoagulation/Antiplatelets/Bleeding tendencies:  Denies   Antibiotics:  None needed    REVIEW OF SYSTEMS:  A comprehensive 10-point review of systems was performed. All systems were reviewed and negative with exception to those reported in the HPI.     PAST MEDICAL HISTORY:  Past Medical History:   Diagnosis Date   ? Cancer (H)    ? Diabetes mellitus (H)    ? Hyperlipidemia    ? Myasthenia gravis (H)        PAST SURGICAL HISTORY:  Past Surgical History:   Procedure Laterality Date   ? G TUBE REPLACEMENT  2/7/2020   ? IR GASTROSTOMY TUBE INSERTION  1/24/2020   ? IR PORT PLACEMENT >5 YEARS  12/23/2019   ? IR TUBE REMOVAL  6/26/2020   ? TONSILLECTOMY AND ADENOIDECTOMY  aged 7   ? WISDOM TOOTH EXTRACTION  age 16       ALLERGIES  Penicillins    MEDICATIONS:  Current Outpatient Medications on File Prior to Encounter   Medication Sig Dispense Refill   ? acetaminophen (TYLENOL) 325 mg/10.15 mL Soln Take 20.3 mL (650 mg total) by mouth every 4 (four) hours as needed.  0   ? alum/mag hydrox-simethicone-diphenhydramine-lidocaine (MAGIC MOUTHWASH) suspension Swish and spit 15 mL every 2 (two) hours as needed (Stomatitis). 120 mL 0   ? atorvastatin (LIPITOR) 40 MG tablet TAKE 1 TABLET BY MOUTH EVERYDAY AT BEDTIME 90 tablet 2   ? azaTHIOprine (IMURAN) 50 mg tablet Take 3 tablets (150 mg total) by mouth 2 (two) times a day. 3 tablets twice daily  0   ? blood glucose test strips Use 1 each As  Directed as needed. Dispense brand per patient's insurance at pharmacy discretion. 100 strip 0   ? glipiZIDE (GLUCOTROL XL) 2.5 MG 24 hr tablet TAKE 1 TABLET (2.5 MG TOTAL) BY MOUTH DAILY. 90 tablet 3   ? lidocaine-prilocaine (EMLA) cream Apply topically as needed. Port     ? LORazepam (ATIVAN) 0.5 MG tablet Take 0.5 mg by mouth every 4 (four) hours as needed for anxiety. Nausea     ? metFORMIN (GLUCOPHAGE) 500 MG tablet Take 2 tablets (1,000 mg total) by mouth 2 (two) times a day with meals. 120 tablet 3   ? OLANZapine (ZYPREXA) 5 MG tablet Take 5 mg by mouth at bedtime.     ? omeprazole (PRILOSEC) 20 MG capsule Take 20 mg by mouth daily before breakfast.     ? prochlorperazine (COMPAZINE) 10 MG tablet Take 10 mg by mouth every 6 (six) hours as needed for nausea.     ? pyridostigmine (MESTINON) 60 mg tablet Take 60 mg by mouth daily.      ? sodium bicarbonate 325 MG tablet 1 tablet (325 mg total) by Enteral Tube route as needed (For clogged feeding tube). OTC product 30 tablet 0   ? viscous lidocaine HC 2 % Soln viscous solution Take 15 mL by mouth 4 (four) times a day before meals and at bedtime. 100 mL 0     No current facility-administered medications on file prior to encounter.         LABS:  INR (no units)   Date Value   01/24/2020 1.22 (H)     Hemoglobin (g/dL)   Date Value   01/27/2020 7.5 (L)     Platelets (thou/uL)   Date Value   01/27/2020 48 (LL)     Creatinine (mg/dL)   Date Value   01/27/2020 0.64 (L)     Potassium (mmol/L)   Date Value   01/27/2020 3.7     EXAM:  There were no vitals taken for this visit.   General:  Stable.  In no acute distress.    Neuro:  A&O x 3. Moves all extremities equally.  Resp:  Lungs clear to auscultation bilaterally.  Cardio:  S1S2 and reg, without murmur, clicks or rubs  Abdomen:  Soft, non-distended, non-tender, positive bowel sounds.    ASSESSMENT:  Granulation tissue at prior G tube site.      PLAN:    Applied silver nitrate to area x3 and neutralized with normal  saline with DSD placed.    -okay to take dressing off next day and apply bandaide  -order placed for another site check potentially for next Tuesday   -patient is amenable to that plan.  -[facility] schedulers will call him on Monday to touch base about coming in on Tuesday    Thank you for kindly for this consultation.     15 minutes were spent on this consultation of which more than 50% of the time was spent face to face with the patient, in reviewing medical record and images and in counseling and coordinating patient's care.    [facility]  Interventional Radiology  [phone]    E/M codes for reference only:  33426

## 2021-07-02 NOTE — H&P
"H&P by Luiza Cooper PA-C at 12/23/2019  8:00 AM     Author: Luiza Cooper PA-C Service: Interventional Radiology Author Type: Physician Assistant    Filed: 12/23/2019  7:56 AM Date of Service: 12/23/2019  8:00 AM Status: Signed    : Luiza Cooper PA-C (Physician Assistant)         Interventional Radiology - Pre-Procedure Note:  12/23/2019    Procedure Requested: port placement  Requested by: Song Cole MD    History and Physical Reviewed: H&P documented within 30 days (by Israel Quiroz MD on 11/26/19).  I have personally reviewed the patient's medical history and have updated the medical record as necessary.    Brief HPI: Cj Morales is a 54 y.o. 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    NPO: midnight.  ANTICOAGULANTS: none  ANTIBIOTICS: clinda ordered for procedure.    ALLERGIES  Penicillins    LABS:  Hemoglobin (g/dL)   Date Value   11/05/2019 12.5 (L)     Platelets (thou/uL)   Date Value   11/05/2019 140     Creatinine (mg/dL)   Date Value   11/05/2019 0.68 (L)     Potassium (mmol/L)   Date Value   11/05/2019 4.5       EXAM:  /62   Pulse 72   Temp 97.8  F (36.6  C) (Oral)   Resp 16   Ht 5' 8\" (1.727 m)   Wt 201 lb (91.2 kg)   SpO2 96%   BMI 30.56 kg/m    General: Stable. In no acute distress.  Neuro: A&O x 3. Moves all extremities equally.  Resp: Lungs clear to auscultation bilaterally.  Cardio: S1S2 and reg, without murmur, clicks or rubs.  Skin: Without excoriations, ecchymosis, erythema, lesions or open sores on upper chest.  Neck: superior right neck with ping pong ball sized mass. FROM of neck      Pre-Sedation Assessment:  Mallampati Airway Classification: Class 3: soft and hard palates clearly visible  Previous reaction to anesthesia/sedation: no  Sedation plan based on assessment: Moderate  ASA Classification: ASA 3 - Patient with " moderate systemic disease with functional limitations    ASSESSMENT/PLAN:   Oropharyngeal carcinoma of the right tongue base    Port placement with sedation.    Procedure, risk/benefits, and sedation reviewed with pt/family. All questions answered. Consent obtained. OK to proceed with above radiology procedure.     Luiza Cooper  Interventional Radiology

## 2021-07-02 NOTE — H&P
H&P by Marilee Mccloud CNP at 8/17/2020 12:30 PM     Author: Marilee Mccloud CNP Service: Interventional Radiology Author Type: Nurse Practitioner    Filed: 8/17/2020 11:52 AM Date of Service: 8/17/2020 12:30 PM Status: Signed    : Marilee Mccloud CNP (Nurse Practitioner)         Interventional Radiology - History and Physical  8/17/2020    Procedure Requested: port removal -RIGHT  Requesting Provider: Song Cole MD    HPI: Cj Morales is a 55 y.o. old male H/O head and neck cancer who no longer needs port. Presents for port removal       NPO Status: [place]  Anticoagulation/Antiplatelets/Bleeding tendencies: NONe   Antibiotics: N/A    Review of Systems: A comprehensive 10-point review of systems was performed. All systems were reviewed and negative with exception to those reported in the HPI.    PMH:  Past Medical History:   Diagnosis Date   ? Cancer (H)    ? Diabetes mellitus (H)    ? Hyperlipidemia    ? Myasthenia gravis (H)        PSH:  Past Surgical History:   Procedure Laterality Date   ? G TUBE REPLACEMENT  2/7/2020   ? IR GASTROSTOMY TUBE INSERTION  1/24/2020   ? IR PORT PLACEMENT >5 YEARS  12/23/2019   ? IR TUBE REMOVAL  6/26/2020   ? TONSILLECTOMY AND ADENOIDECTOMY  aged 7   ? WISDOM TOOTH EXTRACTION  age 16       ALLERGIES  Penicillins    MEDICATIONS:  Current Outpatient Medications on File Prior to Encounter   Medication Sig Dispense Refill   ? acetaminophen (TYLENOL) 325 mg/10.15 mL Soln Take 20.3 mL (650 mg total) by mouth every 4 (four) hours as needed.  0   ? alum/mag hydrox-simethicone-diphenhydramine-lidocaine (MAGIC MOUTHWASH) suspension Swish and spit 15 mL every 2 (two) hours as needed (Stomatitis). 120 mL 0   ? atorvastatin (LIPITOR) 40 MG tablet TAKE 1 TABLET BY MOUTH EVERYDAY AT BEDTIME 90 tablet 2   ? azaTHIOprine (IMURAN) 50 mg tablet Take 3 tablets (150 mg total) by mouth 2 (two) times a day. 3 tablets twice daily  0   ? blood glucose test strips Use 1 each As  Directed as needed. Dispense brand per patient's insurance at pharmacy discretion. 100 strip 0   ? glipiZIDE (GLUCOTROL XL) 2.5 MG 24 hr tablet TAKE 1 TABLET (2.5 MG TOTAL) BY MOUTH DAILY. 90 tablet 3   ? lidocaine-prilocaine (EMLA) cream Apply topically as needed. Port     ? LORazepam (ATIVAN) 0.5 MG tablet Take 0.5 mg by mouth every 4 (four) hours as needed for anxiety. Nausea     ? metFORMIN (GLUCOPHAGE) 500 MG tablet Take 2 tablets (1,000 mg total) by mouth 2 (two) times a day with meals. 120 tablet 3   ? OLANZapine (ZYPREXA) 5 MG tablet Take 5 mg by mouth at bedtime.     ? omeprazole (PRILOSEC) 20 MG capsule Take 20 mg by mouth daily before breakfast.     ? prochlorperazine (COMPAZINE) 10 MG tablet Take 10 mg by mouth every 6 (six) hours as needed for nausea.     ? pyridostigmine (MESTINON) 60 mg tablet Take 60 mg by mouth daily.      ? sodium bicarbonate 325 MG tablet 1 tablet (325 mg total) by Enteral Tube route as needed (For clogged feeding tube). OTC product 30 tablet 0   ? viscous lidocaine HC 2 % Soln viscous solution Take 15 mL by mouth 4 (four) times a day before meals and at bedtime. 100 mL 0     No current facility-administered medications on file prior to encounter.        LABS:NONE     EXAM:  BP 95/57   Pulse 75   Temp 98.4  F (36.9  C) (Oral)   Resp 16   Wt 160 lb (72.6 kg)   SpO2 96%   BMI 24.33 kg/m      General: Stable. In no acute distress  Neuro: A&O x3.    Resp: Lungs CTA bilaterally.  Cardio: S1S2 and reg, without murmur, clicks or rubs.  Abdomen: +BS. Soft, non-distended and non-tender.      Pre-Sedation Assessment:  Mallampati Airway Classification: Class 2: upper half of tonsil fossa visible  Previous reaction to anesthesia/sedation: no  Sedation plan based on assessment: moderate   Sleep Apnea: no  Dentures: no  COPD: no  ASA Classification: ASA 3 - Patient with moderate systemic disease with functional limitations    ASSESSMENT:    Port no longer needed     PLAN:    Port removal      The procedure, risks and moderate sedation were discussed with patient, all questions answered and patient agrees to proceed with the procedure.      Marilee Mccloud, CNP  Interventional Radiology  [phone]

## 2021-07-03 NOTE — ADDENDUM NOTE
Addendum Note by Silvia Lino DO at 1/7/2019  9:00 AM     Author: Silvia Lino DO Service: -- Author Type: Physician    Filed: 1/17/2019  9:34 AM Encounter Date: 1/7/2019 Status: Signed    : Silvia Lino DO (Physician)    Addended by: SILVIA LINO on: 1/17/2019 09:34 AM        Modules accepted: Orders

## 2021-09-05 ENCOUNTER — HEALTH MAINTENANCE LETTER (OUTPATIENT)
Age: 56
End: 2021-09-05

## 2021-10-28 ENCOUNTER — TRANSFERRED RECORDS (OUTPATIENT)
Dept: HEALTH INFORMATION MANAGEMENT | Facility: CLINIC | Age: 56
End: 2021-10-28

## 2021-10-31 ENCOUNTER — HEALTH MAINTENANCE LETTER (OUTPATIENT)
Age: 56
End: 2021-10-31

## 2021-12-07 ENCOUNTER — TRANSFERRED RECORDS (OUTPATIENT)
Dept: HEALTH INFORMATION MANAGEMENT | Facility: CLINIC | Age: 56
End: 2021-12-07
Payer: COMMERCIAL

## 2021-12-08 ENCOUNTER — TRANSFERRED RECORDS (OUTPATIENT)
Dept: HEALTH INFORMATION MANAGEMENT | Facility: CLINIC | Age: 56
End: 2021-12-08
Payer: COMMERCIAL

## 2021-12-10 ENCOUNTER — TRANSFERRED RECORDS (OUTPATIENT)
Dept: HEALTH INFORMATION MANAGEMENT | Facility: CLINIC | Age: 56
End: 2021-12-10
Payer: COMMERCIAL

## 2021-12-13 ENCOUNTER — TRANSFERRED RECORDS (OUTPATIENT)
Dept: HEALTH INFORMATION MANAGEMENT | Facility: CLINIC | Age: 56
End: 2021-12-13
Payer: COMMERCIAL

## 2022-01-05 PROBLEM — R59.1 LYMPHADENOPATHY: Status: ACTIVE | Noted: 2019-11-05

## 2022-01-05 PROBLEM — G70.00 MYASTHENIA GRAVIS (H): Status: ACTIVE | Noted: 2017-08-28

## 2022-01-05 PROBLEM — D70.9 NEUTROPENIA (H): Status: ACTIVE | Noted: 2020-01-19

## 2022-01-05 PROBLEM — C14.0: Status: ACTIVE | Noted: 2019-11-12

## 2022-01-11 ENCOUNTER — OFFICE VISIT (OUTPATIENT)
Dept: FAMILY MEDICINE | Facility: CLINIC | Age: 57
End: 2022-01-11
Payer: COMMERCIAL

## 2022-01-11 ENCOUNTER — MEDICAL CORRESPONDENCE (OUTPATIENT)
Dept: HEALTH INFORMATION MANAGEMENT | Facility: CLINIC | Age: 57
End: 2022-01-11

## 2022-01-11 VITALS
DIASTOLIC BLOOD PRESSURE: 76 MMHG | TEMPERATURE: 96 F | BODY MASS INDEX: 29.57 KG/M2 | WEIGHT: 194.5 LBS | SYSTOLIC BLOOD PRESSURE: 122 MMHG | RESPIRATION RATE: 16 BRPM | HEART RATE: 72 BPM

## 2022-01-11 DIAGNOSIS — G62.0 DRUG-INDUCED POLYNEUROPATHY (H): ICD-10-CM

## 2022-01-11 DIAGNOSIS — Z00.00 HEALTHCARE MAINTENANCE: ICD-10-CM

## 2022-01-11 DIAGNOSIS — C14.0 MALIGNANT NEOPLASM OF PHARYNX (H): ICD-10-CM

## 2022-01-11 DIAGNOSIS — G70.00 MYASTHENIA GRAVIS (H): ICD-10-CM

## 2022-01-11 DIAGNOSIS — E11.9 TYPE 2 DIABETES MELLITUS WITHOUT COMPLICATION, WITHOUT LONG-TERM CURRENT USE OF INSULIN (H): Primary | ICD-10-CM

## 2022-01-11 DIAGNOSIS — Z12.11 COLON CANCER SCREENING: ICD-10-CM

## 2022-01-11 PROBLEM — D70.9 NEUTROPENIA (H): Status: RESOLVED | Noted: 2020-01-19 | Resolved: 2022-01-11

## 2022-01-11 LAB — HBA1C MFR BLD: 5.4 % (ref 0–5.6)

## 2022-01-11 PROCEDURE — 99214 OFFICE O/P EST MOD 30 MIN: CPT | Mod: 25 | Performed by: FAMILY MEDICINE

## 2022-01-11 PROCEDURE — 83036 HEMOGLOBIN GLYCOSYLATED A1C: CPT | Performed by: FAMILY MEDICINE

## 2022-01-11 PROCEDURE — 90472 IMMUNIZATION ADMIN EACH ADD: CPT | Performed by: FAMILY MEDICINE

## 2022-01-11 PROCEDURE — 90471 IMMUNIZATION ADMIN: CPT | Performed by: FAMILY MEDICINE

## 2022-01-11 PROCEDURE — 90732 PPSV23 VACC 2 YRS+ SUBQ/IM: CPT | Performed by: FAMILY MEDICINE

## 2022-01-11 PROCEDURE — 36415 COLL VENOUS BLD VENIPUNCTURE: CPT | Performed by: FAMILY MEDICINE

## 2022-01-11 PROCEDURE — 90750 HZV VACC RECOMBINANT IM: CPT | Performed by: FAMILY MEDICINE

## 2022-01-11 RX ORDER — GABAPENTIN 300 MG/1
300 CAPSULE ORAL 3 TIMES DAILY
Qty: 90 CAPSULE | Refills: 3 | Status: SHIPPED | OUTPATIENT
Start: 2022-01-11 | End: 2022-05-09

## 2022-01-11 NOTE — ASSESSMENT & PLAN NOTE
Pneumovax, Shingrix, colonoscopy referral.  Patient is going to delay this until after school in June.

## 2022-01-11 NOTE — PROGRESS NOTES
Assessment/ Plan  Healthcare maintenance  Pneumovax, Shingrix, colonoscopy referral.  Patient is going to delay this until after school in June.    Malignant neoplasm of pharynx (H)  Scans have been negative.    Myasthenia gravis (H)  Pyridostigmine-helps some with salivary gland dryness as well.    Type 2 diabetes mellitus (H)  Recheck, diabetic, then basically in remission since massive weight loss associated with malignancy.  On metformin alone    Drug-induced polyneuropathy (H)  Tingly at night, bothersome, not painful.  Trial of gabapentin, 300 mg nightly for 1 week, 300 twice daily for 1 week, then 300 3 times daily indefinitely.  Follow-up if not improving after 1 to 2 months.     Subjective  CC:  chief complaint  HPI:  56-year-old  History of type 2 diabetes.  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  He lost a lot of weight  Main problem now is tingling in his feet.  Bothers him particularly at night.  Began when he was going through chemotherapy.  Does not check blood sugars.  Taking metformin alone for diabetes.  PFSH:  Patient Active Problem List   Diagnosis     Dehydration     Dysphagia     Essential tremor     Healthcare maintenance     Hyperlipidemia     Hypomagnesemia     Lymphadenopathy     Malignant neoplasm of pharynx (H)     Myasthenia gravis (H)     Obesity     Physiological tremor     Rash and nonspecific skin eruption     Type 2 diabetes mellitus (H)     Drug-induced polyneuropathy (H)     atorvastatin (LIPITOR) 40 MG tablet, Take 1 tablet by mouth At Bedtime  metFORMIN (GLUCOPHAGE) 500 MG tablet, Take 500 mg by mouth 2 times daily (with meals)  pyridostigmine (MESTINON) 60 MG tablet, Take 1 tablet (60 mg) by mouth 2 times daily    No current facility-administered medications on file prior to visit.       History   Smoking Status     Never Smoker   Smokeless Tobacco      Never Used     Comment: no passive exposure     Social History     Social History Narrative     Not on file     Patient Care Team:  Israel Quiroz MD as PCP - General (Family Medicine)  Mimi Asif, Aryan as Pharmacist (Pharmacist)  Vinod Stephens MD as MD (Neurology)  Vinod Stephens MD as Assigned Neuroscience Provider  Israel Quiroz MD as Assigned PCP  ROS  As above      Objective  Physical Exam  Vitals:    01/11/22 1027   BP: 122/76   BP Location: Left arm   Patient Position: Sitting   Cuff Size: Adult Large   Pulse: 72   Resp: 16   Temp: (!) 96  F (35.6  C)   TempSrc: Temporal   Weight: 88.2 kg (194 lb 8 oz)     Body mass index is 29.57 kg/m .  Gen- alert, oriented/ appropriately responsive  HEENT- normal cephalic, atraumatic.   Chest- Normal inspiration and expiration.    Clear to ascultation.    No chest wall deformity or scar.  CV- Heart regular rate and rhythm  normal tones, no murmurs   No gallops or rubs.  Ext- appear well perfused, no edema  Skin- warm and dry,   no visualized rash    Diagnostics:   .[email or web address]        Please note: Voice recognition software was used in this dictation.  It may therefore contain typographical errors.

## 2022-01-11 NOTE — ASSESSMENT & PLAN NOTE
Recheck, diabetic, then basically in remission since massive weight loss associated with malignancy.  On metformin alone

## 2022-01-11 NOTE — ASSESSMENT & PLAN NOTE
Tingly at night, bothersome, not painful.  Trial of gabapentin, 300 mg nightly for 1 week, 300 twice daily for 1 week, then 300 3 times daily indefinitely.  Follow-up if not improving after 1 to 2 months.

## 2022-02-18 DIAGNOSIS — G70.00 MYASTHENIA GRAVIS (H): ICD-10-CM

## 2022-02-18 RX ORDER — PYRIDOSTIGMINE BROMIDE 60 MG/1
60 TABLET ORAL 2 TIMES DAILY
Qty: 60 TABLET | Refills: 0 | Status: SHIPPED | OUTPATIENT
Start: 2022-02-18 | End: 2022-03-14

## 2022-02-18 NOTE — TELEPHONE ENCOUNTER
Refill request for Mestinon. Pt last seen 1/19/21 by Dr. Stephens and is currently overdue for follow up. Will send in 30 day supply with zero refills. Will also send [employer] message regarding follow up need.    Althea Aguero RN on 2/18/2022 at 8:22 AM

## 2022-02-20 ENCOUNTER — HEALTH MAINTENANCE LETTER (OUTPATIENT)
Age: 57
End: 2022-02-20

## 2022-03-02 NOTE — TELEPHONE ENCOUNTER
RECORDS RECEIVED FROM: Self   REASON FOR VISIT: MG   Date of Appt: 3/23/22   NOTES (FOR ALL VISITS) STATUS DETAILS   OFFICE NOTE from other specialist Internal Dr Vinod Stephens @ [facility] Neurology:  1/19/21   MEDICATION LIST Internal    IMAGING  (FOR ALL VISITS)     MRI (HEAD, NECK, SPINE) Internal [facility]:  MRI Lumbar Spine 1/28/19

## 2022-03-14 DIAGNOSIS — G70.00 MYASTHENIA GRAVIS (H): ICD-10-CM

## 2022-03-14 RX ORDER — PYRIDOSTIGMINE BROMIDE 60 MG/1
TABLET ORAL
Qty: 60 TABLET | Refills: 0 | Status: SHIPPED | OUTPATIENT
Start: 2022-03-14 | End: 2022-03-23

## 2022-03-14 NOTE — TELEPHONE ENCOUNTER
Refill request for Mestinon. Pt last seen 1/19/21 by Dr. Stephens and is following up with Dr. Page on 3/23/22. Will send in 1 refill.     Althea Aguero RN on 3/14/2022 at 11:19 AM

## 2022-03-19 ASSESSMENT — ENCOUNTER SYMPTOMS
EYE IRRITATION: 1
MUSCLE CRAMPS: 1
MYALGIAS: 1

## 2022-03-23 ENCOUNTER — PRE VISIT (OUTPATIENT)
Dept: NEUROLOGY | Facility: CLINIC | Age: 57
End: 2022-03-23

## 2022-03-23 ENCOUNTER — OFFICE VISIT (OUTPATIENT)
Dept: NEUROLOGY | Facility: CLINIC | Age: 57
End: 2022-03-23
Payer: COMMERCIAL

## 2022-03-23 VITALS
HEART RATE: 63 BPM | BODY MASS INDEX: 31.83 KG/M2 | OXYGEN SATURATION: 99 % | HEIGHT: 68 IN | SYSTOLIC BLOOD PRESSURE: 120 MMHG | WEIGHT: 210 LBS | RESPIRATION RATE: 16 BRPM | DIASTOLIC BLOOD PRESSURE: 75 MMHG

## 2022-03-23 DIAGNOSIS — G70.00 OCULAR MYASTHENIA (H): Primary | ICD-10-CM

## 2022-03-23 DIAGNOSIS — G70.00 MYASTHENIA GRAVIS (H): ICD-10-CM

## 2022-03-23 DIAGNOSIS — K29.50 CHRONIC GASTRITIS WITHOUT BLEEDING, UNSPECIFIED GASTRITIS TYPE: ICD-10-CM

## 2022-03-23 DIAGNOSIS — G62.0 DRUG-INDUCED POLYNEUROPATHY (H): ICD-10-CM

## 2022-03-23 PROCEDURE — 99214 OFFICE O/P EST MOD 30 MIN: CPT | Performed by: PSYCHIATRY & NEUROLOGY

## 2022-03-23 RX ORDER — PYRIDOSTIGMINE BROMIDE 60 MG/1
TABLET ORAL
Qty: 180 TABLET | Refills: 1 | OUTPATIENT
Start: 2022-03-23

## 2022-03-23 RX ORDER — LEVOTHYROXINE SODIUM 100 UG/1
TABLET ORAL DAILY
COMMUNITY
Start: 2022-03-20 | End: 2023-02-14

## 2022-03-23 RX ORDER — PYRIDOSTIGMINE BROMIDE 60 MG/1
60 TABLET ORAL 3 TIMES DAILY
Qty: 60 TABLET | Refills: 0 | Status: SHIPPED | OUTPATIENT
Start: 2022-03-23 | End: 2022-04-08

## 2022-03-23 RX ORDER — PREDNISONE 10 MG/1
TABLET ORAL
Qty: 60 TABLET | Refills: 1 | Status: SHIPPED | OUTPATIENT
Start: 2022-03-23 | End: 2022-05-09

## 2022-03-23 ASSESSMENT — PAIN SCALES - GENERAL: PAINLEVEL: NO PAIN (0)

## 2022-03-23 NOTE — PROGRESS NOTES
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 His current treatment consists only of mestinon 60 mg BID. There has been worsening of ocular symptoms over the past two weeks; his MG is currently not adequately controlled and current symptoms are concerning for a flare-up. It is not clear at this point whether neck flexion weakness and discomfort with swallowing/mild dysphagia are secondary to radiation in the head/neck region for the tongue cancer or are part of his myasthenia presentation, but in the absence of any other generalized myasthenia symptoms, I suspect this is still ocular MG. He will likely need to be put back on azathioprine in the future, but we will focus on treating this current flare-up for now.    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 Mr. Morales also describes leg cramps that are most bothersome at night - this could also be from neuropathy but could also be a side effect of mestinon. Cramps can improve with gabapentin, so we can further optimize this medication.     Plan:  - Increase mestinon to 60 mg TID   - Begin 10 mg prednisone daily in the morning for 1 week. If tolerated, increase to 20 mg daily and remain on this dose until follow-up apt with Dr. Page.   - Omeprazole while on prednisone (script sent to pharmacy)   - Increase  "gabapentin to 600 mg TID for leg cramps (patient provided slow taper up schedule in AVS).     RTC in 6 weeks.     Patient was seen and discussed with Dr. Page.     Hernandez Dawson MD  PGY-3 Neurology Resident  [facility]        ATTENDING ADDENDUM: Patient seen and examined with resident Dr Dawson at the Neuromuscular Clinic on 3/23/2022. Agree with her assessment and plan.     Billing MDM level 4 (moderate): 1) Problems assessed: One chronic condition with progression, exacerbation, or side effects of treatment (MG) and 2) Risk: prescription drug management, see above.   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     MG symptoms began in 2017 with lid lag (R>L) and eyelid fluttering. No hx of generalized weakness. No known hx of thymoma. He was initially treated with azathioprine and mestinon (has not required steroids for MG) and was stable on that regimen for some time. However, when he was diagnosed with head/neck cancer in 2019, he was taken off azathioprine while on cisplatin chemotherapy. Decision was made not to restart it due to patient preference. He was previously being followed by Dr. Stephens; last visit with him was on 1/19/21 and at that time, his myasthenia symptoms were felt to be well controlled. Since then, his MG symptoms remained relatively well controlled, until about 2 weeks ago, when he had recurrence of eyelid fluttering and right eyelid ptosis.     He denies choking/coughing on food or thin liquids, but endorses an \"odd " "sensation that food is not moving the way it is supposed to\". He attributes this to radiation damage of the soft tissues in the throat. Last swallow study was over 1 year ago.     Patient endorses muscle cramps/pains in his lower legs ever since chemotherapy. PCP did some workup of this last year (CK, BMP, etc.) which was all unremarkable. There was some thought that his symptoms could have been due to statin intolerance, so atorvastatin was lowered to 10 mg daily and patient noted mild improvement in symptoms. Symptoms are worse at night and can even wake him up from sleep. Endorses rare fasciculations in the arms but not in the leg. Cramps tend to improve with exercise. Stretching and weight bearing helps. Has never occurred in arms/thighs. Cramps occur about 3-4 days/week. Gabapentin was started for tingling neuropathy in the bottom of his feet but has not been noticeably helpful for muscle aches.       MG Activities of Daily Living (MG-ADL) profile    Grade 0 1 2 3   Talking Normal Intermittent slurring/nasal speech Constant slurring/nasal speech, can be understood Difficult to understand   Chewing Normal Fatigue with solid food Fatigue with soft food G tube   Swallowing Normal Rare choking Frequent choking necessitating diet changes G tube   Breathing Normal SOB with exertion SOB at rest Ventilator dependent   Ability to brush teeth/comb hair Normal Extra effort, no rest periods needed Rest periods needed Cannot do one of these   Ability to rise from chair Normal Mild impairment, uses arms sometimes Moderate impairment, always uses arms Severe impairment, requires assistance   Double vision None Present, not daily Daily, not constant Constant   Ptosis None Present, but not daily Daily, not constant Constant       PHYSICAL EXAMINATION:  Vitals: /75   Pulse 63   Resp 16   Ht 1.727 m (5' 8\")   Wt 95.3 kg (210 lb)   SpO2 99%   BMI 31.93 kg/m    General: Appears stated age  Psych: Appropriate mood and " affect  Extremities: No pitting edema  Neurologic:  Mental status: Patient is oriented to person, place and time and able to provide a detailed account of history of illness.     Language: Speech is fluent; comprehension normal.     Cranial nerves: Visual fields are full and extraocular movements are normal. Facial strength and sensation are normal. Hearing is normal to conversation. The palate rises symmetrically and there is no dysarthria. Tongue protrusion is normal. Orbicularis oculi and oris are strong.     Power: Neck flexion 4, neck extension 5. Deltoids, biceps, triceps, hand , hip flexion, hip extension, knee flexion, knee extension, plantarflexion, dorsiflexion 5/5 bilaterally.      Reflexes: Biceps, brachioradialis, triceps 2/4 bilaterally. Plantar responses are flexor bilaterally. Achilles tendon reflexes absent. Patellar tendon reflexes 1+ bilaterally    Motor/cerebellar: There are no tremors, myoclonus, or other abnormal movements. Muscle bulk and tone are normal. Finger-to-nose intact without dysmetria.     Sensation: Diminished pinprick and LT sensation in glove and stocking distribution. Vibration absent at big toes and 3 seconds at right ankle, 4 seconds at left ankle.     Gait: Gait is narrow based and steady and the patient is able to walk on heels, toes and in tandem.       REVIEW OF SYSTEMS: 12-point RoS negative except as per HPI.    ALLERGIES:  Allergies   Allergen Reactions     Penicillins Hives and Rash     Pen V K        MEDICATIONS:  Current Outpatient Medications   Medication Sig Dispense Refill     atorvastatin (LIPITOR) 10 MG tablet Take 1 tablet by mouth At Bedtime        gabapentin (NEURONTIN) 300 MG capsule Take 1 capsule (300 mg) by mouth 3 times daily 90 capsule 3     levothyroxine (SYNTHROID/LEVOTHROID) 100 MCG tablet daily       metFORMIN (GLUCOPHAGE) 500 MG tablet Take 1,000 mg by mouth 2 times daily (with meals)        omeprazole (PRILOSEC) 20 MG DR capsule Take 1 capsule  (20 mg) by mouth daily 30 capsule 1     predniSONE (DELTASONE) 10 MG tablet 1 tablet daily for two weeks, then two tablets daily 60 tablet 1     pyridostigmine (MESTINON) 60 MG tablet Take 1 tablet (60 mg) by mouth 3 times daily 60 tablet 0     PAST MEDICAL HISTORY:  Past Medical History:   Diagnosis Date     Cancer (H)      Diabetes (H)      Diabetes mellitus (H)      Hyperlipidemia      MG (myasthenia gravis) (H)      Myasthenia gravis (H)      PAST SURGICAL HISTORY:  Past Surgical History:   Procedure Laterality Date     G TUBE REPLACEMENT  2/7/2020     IR CHEST PORT PLACEMENT > 5 YRS OF AGE  12/23/2019     IR GASTROSTOMY TUBE CHANGE  2/7/2020     IR GASTROSTOMY TUBE INSERTION  1/24/2020     IR GASTROSTOMY TUBE PERCUTANEOUS PLCMNT  1/24/2020     IR PORT PLACEMENT >5 YEARS  12/23/2019     IR PORT REMOVAL  8/17/2020     IR PORT REMOVAL RIGHT  8/17/2020     IR SITE CHECK/EVALUATION  2/7/2020     IR SITE CHECK/EVALUATION  7/9/2020     IR TUBE REMOVAL  6/26/2020     IR TUBE REMOVAL  6/26/2020     TONSILLECTOMY & ADENOIDECTOMY  aged 7     WISDOM TOOTH EXTRACTION  age 16     SOCIAL HISTORY:  Social History     Socioeconomic History     Marital status:      Spouse name: Not on file     Number of children: Not on file     Years of education: Not on file     Highest education level: Not on file   Occupational History     Not on file   Tobacco Use     Smoking status: Never Smoker     Smokeless tobacco: Never Used     Tobacco comment: no passive exposure   Substance and Sexual Activity     Alcohol use: Not Currently     Comment: rare     Drug use: Never     Sexual activity: Not on file   Other Topics Concern     Parent/sibling w/ CABG, MI or angioplasty before 65F 55M? No   Social History Narrative     Not on file     Social Determinants of Health     Financial Resource Strain: Not on file   Food Insecurity: Not on file   Transportation Needs: Not on file   Physical Activity: Not on file   Stress: Not on file   Social  Connections: Not on file   Intimate Partner Violence: Not on file   Housing Stability: Not on file     FAMILY HISTORY:  Father - thigh cramps since age 30s-40s

## 2022-03-23 NOTE — LETTER
3/23/2022       RE: Cj Morales  [address] [place] [address]     Dear Colleague,    Thank you for referring your patient, Cj Morales, to the [facility] [place] at [facility]. Please see a copy of my visit note below.      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 His current treatment consists only of mestinon 60 mg BID. There has been worsening of ocular symptoms over the past two weeks; his MG is currently not adequately controlled and current symptoms are concerning for a flare-up. It is not clear at this point whether neck flexion weakness and discomfort with swallowing/mild dysphagia are secondary to radiation in the head/neck region for the tongue cancer or are part of his myasthenia presentation, but in the absence of any other generalized myasthenia symptoms, I suspect this is still ocular MG. He will likely need to be put back on azathioprine in the future, but we will focus on treating this current flare-up for now.    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 Mr. Morales also describes leg cramps that are most bothersome at night - this could also be from neuropathy but could also be a side effect of mestinon. Cramps can improve with  gabapentin, so we can further optimize this medication.     Plan:  - Increase mestinon to 60 mg TID   - Begin 10 mg prednisone daily in the morning for 1 week. If tolerated, increase to 20 mg daily and remain on this dose until follow-up apt with Dr. Page.   - Omeprazole while on prednisone (script sent to pharmacy)   - Increase gabapentin to 600 mg TID for leg cramps (patient provided slow taper up schedule in AVS).     RTC in 6 weeks.     Patient was seen and discussed with Dr. Page.     Hernandez Dawson MD  PGY-3 Neurology Resident  [facility]        ATTENDING ADDENDUM: Patient seen and examined with resident Dr Dawson at the Neuromuscular Clinic on 3/23/2022. Agree with her assessment and plan.     Billing MDM level 4 (moderate): 1) Problems assessed: One chronic condition with progression, exacerbation, or side effects of treatment (MG) and 2) Risk: prescription drug management, see above.   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     MG symptoms began in 2017 with lid lag (R>L) and eyelid fluttering. No hx of generalized weakness. No known hx of thymoma. He was initially treated with azathioprine and mestinon (has not required steroids for MG) and was stable on that regimen for some time. However, when he was diagnosed with head/neck cancer in 2019, he was taken off azathioprine while on cisplatin chemotherapy. Decision was made not to restart it due to patient preference. He was previously being followed by Dr. Stephens; last visit with  "him was on 1/19/21 and at that time, his myasthenia symptoms were felt to be well controlled. Since then, his MG symptoms remained relatively well controlled, until about 2 weeks ago, when he had recurrence of eyelid fluttering and right eyelid ptosis.     He denies choking/coughing on food or thin liquids, but endorses an \"odd sensation that food is not moving the way it is supposed to\". He attributes this to radiation damage of the soft tissues in the throat. Last swallow study was over 1 year ago.     Patient endorses muscle cramps/pains in his lower legs ever since chemotherapy. PCP did some workup of this last year (CK, BMP, etc.) which was all unremarkable. There was some thought that his symptoms could have been due to statin intolerance, so atorvastatin was lowered to 10 mg daily and patient noted mild improvement in symptoms. Symptoms are worse at night and can even wake him up from sleep. Endorses rare fasciculations in the arms but not in the leg. Cramps tend to improve with exercise. Stretching and weight bearing helps. Has never occurred in arms/thighs. Cramps occur about 3-4 days/week. Gabapentin was started for tingling neuropathy in the bottom of his feet but has not been noticeably helpful for muscle aches.       MG Activities of Daily Living (MG-ADL) profile    Grade 0 1 2 3   Talking Normal Intermittent slurring/nasal speech Constant slurring/nasal speech, can be understood Difficult to understand   Chewing Normal Fatigue with solid food Fatigue with soft food G tube   Swallowing Normal Rare choking Frequent choking necessitating diet changes G tube   Breathing Normal SOB with exertion SOB at rest Ventilator dependent   Ability to brush teeth/comb hair Normal Extra effort, no rest periods needed Rest periods needed Cannot do one of these   Ability to rise from chair Normal Mild impairment, uses arms sometimes Moderate impairment, always uses arms Severe impairment, requires assistance   Double " "vision None Present, not daily Daily, not constant Constant   Ptosis None Present, but not daily Daily, not constant Constant       PHYSICAL EXAMINATION:  Vitals: /75   Pulse 63   Resp 16   Ht 1.727 m (5' 8\")   Wt 95.3 kg (210 lb)   SpO2 99%   BMI 31.93 kg/m    General: Appears stated age  Psych: Appropriate mood and affect  Extremities: No pitting edema  Neurologic:  Mental status: Patient is oriented to person, place and time and able to provide a detailed account of history of illness.     Language: Speech is fluent; comprehension normal.     Cranial nerves: Visual fields are full and extraocular movements are normal. Facial strength and sensation are normal. Hearing is normal to conversation. The palate rises symmetrically and there is no dysarthria. Tongue protrusion is normal. Orbicularis oculi and oris are strong.     Power: Neck flexion 4, neck extension 5. Deltoids, biceps, triceps, hand , hip flexion, hip extension, knee flexion, knee extension, plantarflexion, dorsiflexion 5/5 bilaterally.      Reflexes: Biceps, brachioradialis, triceps 2/4 bilaterally. Plantar responses are flexor bilaterally. Achilles tendon reflexes absent. Patellar tendon reflexes 1+ bilaterally    Motor/cerebellar: There are no tremors, myoclonus, or other abnormal movements. Muscle bulk and tone are normal. Finger-to-nose intact without dysmetria.     Sensation: Diminished pinprick and LT sensation in glove and stocking distribution. Vibration absent at big toes and 3 seconds at right ankle, 4 seconds at left ankle.     Gait: Gait is narrow based and steady and the patient is able to walk on heels, toes and in tandem.       REVIEW OF SYSTEMS: 12-point RoS negative except as per HPI.    ALLERGIES:  Allergies   Allergen Reactions     Penicillins Hives and Rash     Pen V K        MEDICATIONS:  Current Outpatient Medications   Medication Sig Dispense Refill     atorvastatin (LIPITOR) 10 MG tablet Take 1 tablet by mouth " At Bedtime        gabapentin (NEURONTIN) 300 MG capsule Take 1 capsule (300 mg) by mouth 3 times daily 90 capsule 3     levothyroxine (SYNTHROID/LEVOTHROID) 100 MCG tablet daily       metFORMIN (GLUCOPHAGE) 500 MG tablet Take 1,000 mg by mouth 2 times daily (with meals)        omeprazole (PRILOSEC) 20 MG DR capsule Take 1 capsule (20 mg) by mouth daily 30 capsule 1     predniSONE (DELTASONE) 10 MG tablet 1 tablet daily for two weeks, then two tablets daily 60 tablet 1     pyridostigmine (MESTINON) 60 MG tablet Take 1 tablet (60 mg) by mouth 3 times daily 60 tablet 0     PAST MEDICAL HISTORY:  Past Medical History:   Diagnosis Date     Cancer (H)      Diabetes (H)      Diabetes mellitus (H)      Hyperlipidemia      MG (myasthenia gravis) (H)      Myasthenia gravis (H)      PAST SURGICAL HISTORY:  Past Surgical History:   Procedure Laterality Date     G TUBE REPLACEMENT  2/7/2020     IR CHEST PORT PLACEMENT > 5 YRS OF AGE  12/23/2019     IR GASTROSTOMY TUBE CHANGE  2/7/2020     IR GASTROSTOMY TUBE INSERTION  1/24/2020     IR GASTROSTOMY TUBE PERCUTANEOUS PLCMNT  1/24/2020     IR PORT PLACEMENT >5 YEARS  12/23/2019     IR PORT REMOVAL  8/17/2020     IR PORT REMOVAL RIGHT  8/17/2020     IR SITE CHECK/EVALUATION  2/7/2020     IR SITE CHECK/EVALUATION  7/9/2020     IR TUBE REMOVAL  6/26/2020     IR TUBE REMOVAL  6/26/2020     TONSILLECTOMY & ADENOIDECTOMY  aged 7     WISDOM TOOTH EXTRACTION  age 16     SOCIAL HISTORY:  Social History     Socioeconomic History     Marital status:      Spouse name: Not on file     Number of children: Not on file     Years of education: Not on file     Highest education level: Not on file   Occupational History     Not on file   Tobacco Use     Smoking status: Never Smoker     Smokeless tobacco: Never Used     Tobacco comment: no passive exposure   Substance and Sexual Activity     Alcohol use: Not Currently     Comment: rare     Drug use: Never     Sexual activity: Not on file    Other Topics Concern     Parent/sibling w/ CABG, MI or angioplasty before 65F 55M? No   Social History Narrative     Not on file     Social Determinants of Health     Financial Resource Strain: Not on file   Food Insecurity: Not on file   Transportation Needs: Not on file   Physical Activity: Not on file   Stress: Not on file   Social Connections: Not on file   Intimate Partner Violence: Not on file   Housing Stability: Not on file     FAMILY HISTORY:  Father - thigh cramps since age 30s-40s        Sincerely,  Eliud Page MD

## 2022-03-23 NOTE — PATIENT INSTRUCTIONS
1. We suspect your myasthenia gravis symptoms are returning/flaring up. Thus we recommend increasing mestinon to THREE times/day. We will also begin steroids. Begin taking 10 mg prednisone daily in the morning for 1 week. If tolerated, please increase to 20 mg daily and keep taking 20 mg daily until you see Dr. Page again in 2 weeks. It is very important that you take an H2 blocker or proton pump inhibitor while on steroids, as they can be hard on the stomach. We will prescribe omeprazole and you take this once/day at the same time that you take the steroid.     2. Your muscle cramps may be a side effect of mestinon or from the neuropathy related to chemotherapy. You had blood work last year to look into alternative causes of muscle cramps and those all returned normal. Gabapentin can be helpful for both neuropathy and cramps, so we recommend increasing gabapentin using the following schedule:     300 mg tabs of gabapentin:  March 24 - March 31: 1 pill in AM, 1 pill in afternoon, 2 pills in PM   April 1st - April 8: 1 pill in the AM, 2 pills in the afternoon, 2 pills in the PM  April 9th - indefinitely: 2 pills in AM, 2 pills in afternoon, 2 pills in PM.     Let us know if you experience any side effects (ex. Brain fog) from the increased gabapentin dose.

## 2022-03-23 NOTE — TELEPHONE ENCOUNTER
Pt is requesting 90 day supply of Mestinon. Pt is seeing Dr. Page today. Will have this provider refill as needed.     Althea Aguero RN on 3/23/2022 at 10:32 AM

## 2022-04-05 DIAGNOSIS — E11.9 TYPE 2 DIABETES MELLITUS WITHOUT COMPLICATION, WITHOUT LONG-TERM CURRENT USE OF INSULIN (H): Primary | ICD-10-CM

## 2022-04-06 NOTE — TELEPHONE ENCOUNTER
"Outpatient Medication Detail     Disp Refills Start End ARNOLD   metFORMIN (GLUCOPHAGE) 500 MG tablet 360 tablet 3 3/2/2021  No   Sig - Route: TAKE 2 TABLETS (1,000 MG TOTAL) BY MOUTH 2 (TWO) TIMES A DAY WITH MEALS - Oral   Sent to pharmacy as: metFORMIN 500 mg tablet (GLUCOPHAGE)   Notes to Pharmacy: WOULD LIKE TO REFILL   E-Prescribing Status: Receipt confirmed by pharmacy (3/2/2021 11:29 AM CST)       Last office visit provider:  1/11/22     Requested Prescriptions   Pending Prescriptions Disp Refills     metFORMIN (GLUCOPHAGE) 500 MG tablet 360 tablet 3     Sig: Take 2 tablets (1,000 mg) by mouth 2 times daily (with meals)       Biguanide Agents Passed - 4/6/2022 10:43 AM        Passed - Patient is age 10 or older        Passed - Patient has documented A1c within the specified period of time.     If HgbA1C is 8 or greater, it needs to be on file within the past 3 months.  If less than 8, must be on file within the past 6 months.     Recent Labs   Lab Test 01/11/22  1117   A1C 5.4             Passed - Patient's CR is NOT>1.4 OR Patient's EGFR is NOT<45 within past 12 mos.     Recent Labs   Lab Test 04/15/21  0810   GFRESTIMATED >60   GFRESTBLACK >60       Recent Labs   Lab Test 04/15/21  0810   CR 0.83             Passed - Patient does NOT have a diagnosis of CHF.        Passed - Medication is active on med list        Passed - Recent (6 mo) or future (30 days) visit within the authorizing provider's specialty     Patient had office visit in the last 6 months or has a visit in the next 30 days with authorizing provider or within the authorizing provider's specialty.  See \"Patient Info\" tab in inbasket, or \"Choose Columns\" in Meds & Orders section of the refill encounter.                 Trever Joiner RN 04/06/22 10:43 AM  "

## 2022-04-08 DIAGNOSIS — G70.00 MYASTHENIA GRAVIS (H): ICD-10-CM

## 2022-04-08 RX ORDER — PYRIDOSTIGMINE BROMIDE 60 MG/1
TABLET ORAL
Qty: 60 TABLET | Refills: 0 | Status: SHIPPED | OUTPATIENT
Start: 2022-04-08 | End: 2022-04-25

## 2022-04-11 RX ORDER — ATORVASTATIN CALCIUM 10 MG/1
10 TABLET, FILM COATED ORAL AT BEDTIME
Qty: 90 TABLET | Refills: 0 | Status: CANCELLED | OUTPATIENT
Start: 2022-04-11 | End: 2022-07-10

## 2022-04-14 ENCOUNTER — MYC MEDICAL ADVICE (OUTPATIENT)
Dept: FAMILY MEDICINE | Facility: CLINIC | Age: 57
End: 2022-04-14
Payer: COMMERCIAL

## 2022-04-14 DIAGNOSIS — E78.5 HYPERLIPIDEMIA, UNSPECIFIED HYPERLIPIDEMIA TYPE: Primary | ICD-10-CM

## 2022-04-14 DIAGNOSIS — K29.50 CHRONIC GASTRITIS WITHOUT BLEEDING, UNSPECIFIED GASTRITIS TYPE: ICD-10-CM

## 2022-04-14 RX ORDER — ATORVASTATIN CALCIUM 10 MG/1
10 TABLET, FILM COATED ORAL AT BEDTIME
Qty: 90 TABLET | Refills: 0 | Status: SHIPPED | OUTPATIENT
Start: 2022-04-14 | End: 2022-08-26

## 2022-04-22 DIAGNOSIS — K29.50 CHRONIC GASTRITIS WITHOUT BLEEDING, UNSPECIFIED GASTRITIS TYPE: ICD-10-CM

## 2022-04-24 DIAGNOSIS — G70.00 MYASTHENIA GRAVIS (H): ICD-10-CM

## 2022-04-25 RX ORDER — PYRIDOSTIGMINE BROMIDE 60 MG/1
TABLET ORAL
Qty: 180 TABLET | Refills: 0 | Status: SHIPPED | OUTPATIENT
Start: 2022-04-25 | End: 2022-06-21

## 2022-05-04 ENCOUNTER — LAB (OUTPATIENT)
Dept: LAB | Facility: CLINIC | Age: 57
End: 2022-05-04
Payer: COMMERCIAL

## 2022-05-04 ENCOUNTER — OFFICE VISIT (OUTPATIENT)
Dept: NEUROLOGY | Facility: CLINIC | Age: 57
End: 2022-05-04
Payer: COMMERCIAL

## 2022-05-04 VITALS
WEIGHT: 206.7 LBS | SYSTOLIC BLOOD PRESSURE: 118 MMHG | DIASTOLIC BLOOD PRESSURE: 77 MMHG | OXYGEN SATURATION: 99 % | HEART RATE: 67 BPM | BODY MASS INDEX: 31.43 KG/M2 | RESPIRATION RATE: 16 BRPM

## 2022-05-04 DIAGNOSIS — Z79.52 CURRENT CHRONIC USE OF SYSTEMIC STEROIDS: ICD-10-CM

## 2022-05-04 DIAGNOSIS — G70.00 MYASTHENIA GRAVIS WITHOUT EXACERBATION (H): Primary | ICD-10-CM

## 2022-05-04 DIAGNOSIS — G70.00 MYASTHENIA GRAVIS WITHOUT EXACERBATION (H): ICD-10-CM

## 2022-05-04 LAB
ANION GAP SERPL CALCULATED.3IONS-SCNC: 8 MMOL/L (ref 3–14)
BUN SERPL-MCNC: 22 MG/DL (ref 7–30)
CALCIUM SERPL-MCNC: 9.6 MG/DL (ref 8.5–10.1)
CHLORIDE BLD-SCNC: 105 MMOL/L (ref 94–109)
CO2 SERPL-SCNC: 27 MMOL/L (ref 20–32)
CREAT SERPL-MCNC: 0.94 MG/DL (ref 0.66–1.25)
GFR SERPL CREATININE-BSD FRML MDRD: >90 ML/MIN/1.73M2
GLUCOSE BLD-MCNC: 139 MG/DL (ref 70–99)
HBA1C MFR BLD: 5.8 % (ref 0–5.6)
POTASSIUM BLD-SCNC: 4.4 MMOL/L (ref 3.4–5.3)
SODIUM SERPL-SCNC: 140 MMOL/L (ref 133–144)

## 2022-05-04 PROCEDURE — 83036 HEMOGLOBIN GLYCOSYLATED A1C: CPT | Performed by: PATHOLOGY

## 2022-05-04 PROCEDURE — 99214 OFFICE O/P EST MOD 30 MIN: CPT | Mod: GC | Performed by: PSYCHIATRY & NEUROLOGY

## 2022-05-04 PROCEDURE — 80048 BASIC METABOLIC PNL TOTAL CA: CPT | Performed by: PATHOLOGY

## 2022-05-04 PROCEDURE — 36415 COLL VENOUS BLD VENIPUNCTURE: CPT | Performed by: PATHOLOGY

## 2022-05-04 ASSESSMENT — PAIN SCALES - GENERAL: PAINLEVEL: NO PAIN (1)

## 2022-05-04 NOTE — LETTER
2022       RE: Cj Morales  [address]  [place] [address]     Dear Colleague,    Thank you for referring your patient, Cj Morales, to the [facility]UR[facility] [place] at [facility]. Please see a copy of my visit note below.        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     Regimen:  Gabapentin 300mg TID  Pyridostigmine 60mg TID  Prednisone 20mg daily - about 7-8 weeks on this dose    The most concerning portion for him in follow-up today is continued cramping of the BLEs, usually distal in the feet and calves.  This occasionally wakes him up or keeps him up at nighttime, but this is not significantly changed in severity over time.  The characteristics and quality of this are also quite similar over time.  He notes that this started after he got chemotherapy for his squamous cell carcinoma, but does not think it is significantly worsened since starting medication regimen for myasthenia gravis.  However, he is on medications that may exacerbate this and Prostigmin.    He denies any savanah ptosis that he notices in either eye.  He does notice some intermittent twitching of the left eye, usually later in the day, but this has not changed that much in the recent weeks.  He continues to deny diplopia at baseline, and denies any induced diplopia or  any fatigability of the eyes.  In review of his weakness he also denies any deficits of power or strength in any of his extremities.  He has never noticed this previously and it has not been induced in recent months.    In neurologic review of systems he also endorses tremulousness of the hands, R>L, which has slightly progressed over the course of months to years.  This is almost always present in the setting of doing tasks and is almost never present at rest.  He does not notice any tremulousness of his neck or jaw.      MG Activities of Daily Living (MG-ADL) profile  Grade 0 1 2 3   Talking Normal Intermittent slurring/nasal speech Constant slurring/nasal speech, can be understood Difficult to understand   Chewing Normal Fatigue with solid food Fatigue with soft food G tube   Swallowing Normal Rare choking Frequent choking necessitating diet changes G tube   Breathing Normal SOB with exertion SOB at rest Ventilator dependent   Ability to brush teeth/comb hair Normal Extra effort, no rest periods needed Rest periods needed Cannot do one of these   Ability to rise from chair Normal Mild impairment, uses arms sometimes Moderate impairment, always uses arms Severe impairment, requires assistance   Double vision None Present, not daily Daily, not constant Constant   Ptosis None Present, but not daily Daily, not constant Constant   Total = 2          ROS: A 10-point ROS was performed as per HPI.    PMH:  Past Medical History:   Diagnosis Date     Cancer (H)      Diabetes (H)      Diabetes mellitus (H)      Hyperlipidemia      MG (myasthenia gravis) (H)      Myasthenia gravis (H)      Past Surgical History:   Procedure Laterality Date     G TUBE REPLACEMENT  2/7/2020     IR CHEST PORT PLACEMENT > 5 YRS OF AGE  12/23/2019     IR GASTROSTOMY TUBE CHANGE  2/7/2020     IR GASTROSTOMY TUBE INSERTION  1/24/2020     IR GASTROSTOMY TUBE PERCUTANEOUS PLCMNT  1/24/2020     IR PORT PLACEMENT >5 YEARS  12/23/2019     IR PORT  REMOVAL  8/17/2020     IR PORT REMOVAL RIGHT  8/17/2020     IR SITE CHECK/EVALUATION  2/7/2020     IR SITE CHECK/EVALUATION  7/9/2020     IR TUBE REMOVAL  6/26/2020     IR TUBE REMOVAL  6/26/2020     TONSILLECTOMY & ADENOIDECTOMY  aged 7     WISDOM TOOTH EXTRACTION  age 16       Allergies:  Allergies   Allergen Reactions     Penicillins Hives and Rash     Pen V K          Medications:      Current Outpatient Medications:      atorvastatin (LIPITOR) 10 MG tablet, Take 1 tablet (10 mg) by mouth At Bedtime, Disp: 90 tablet, Rfl: 0     gabapentin (NEURONTIN) 300 MG capsule, Take 1 capsule (300 mg) by mouth 3 times daily, Disp: 90 capsule, Rfl: 3     levothyroxine (SYNTHROID/LEVOTHROID) 100 MCG tablet, daily, Disp: , Rfl:      metFORMIN (GLUCOPHAGE) 500 MG tablet, Take 2 tablets (1,000 mg) by mouth 2 times daily (with meals), Disp: 360 tablet, Rfl: 1     predniSONE (DELTASONE) 10 MG tablet, 1 tablet daily for two weeks, then two tablets daily (Patient taking differently: 2 tablets daily), Disp: 60 tablet, Rfl: 1     pyridostigmine (MESTINON) 60 MG tablet, TAKE 1 TABLET BY MOUTH THREE TIMES A DAY, Disp: 180 tablet, Rfl: 0     omeprazole (PRILOSEC) 20 MG DR capsule, TAKE 1 CAPSULE BY MOUTH EVERY DAY, Disp: 90 capsule, Rfl: 1    Social History:  Social History     Tobacco Use     Smoking status: Never Smoker     Smokeless tobacco: Never Used     Tobacco comment: no passive exposure   Substance Use Topics     Alcohol use: Not Currently     Comment: rare       Family History:    Family History   Problem Relation Age of Onset     Dementia Mother      Hypertension Mother      Tremor Mother      Cancer Father      Diabetes Father      Myocardial Infarction Father      Hyperlipidemia Father      Sleep Apnea Father          Physical Examination  Vitals: /77   Pulse 67   Resp 16   Wt 93.8 kg (206 lb 11.2 oz)   SpO2 99%   BMI 31.43 kg/m      General: Alert, interactive; NAD, cooperative  HEENT: Normocephalic, atraumatic,  nares patent, lower teeth missing due to resection surgery  Resp: No increased work of breathing   Cardio: RRR, S1/S2 appropriate   Extremities: Warm and well perfused, peripheral pulses present   Skin: Not jaundiced, no rash, no ecchymoses  Psych: Normal mood and affect  Neuro:   Mental status: Awake, attentive, interactive; Recalls remote and recent history with accuracy  Speech/Language: Fluent speech without paraphasic errors; Mild dysarthria in the setting of partial edentulousness; naming, repetition, comprehension intact  Cranial nerves: Visual fields intact to confrontation, Eyes conjugate, Pupils 4mm and brisk, EOMI w/ normal and smooth pursuit, face expression symmetric, facial sensation intact and symmetric, hearing intact to conversation, shoulder shrug strong, palate rise symmetric, tongue/uvula midline.  *Strong orbicularis oculi, orbicularis oris, tongue pressure constrict, and cheek puff.  Very mild induced ptosis of right eyelid after 30 seconds looking upward, normal on the left.  Motor:   Bulk: Appropriate  Tone: Appropriate with some occasional paratonia  Spontaneous movements: No myoclonus or asterixis; otherwise appropriate at rest  Power:    Right Left   Arm abduction 5/5 5/5   Elbow Flex 5/5 5/5   Elbow Extension 5/5 5/5   Wrist Extension 5/5 5/5   FDI  5/5 5/5   APB 5/5 5/5     5/5 5/5   Hip Flexion 5/5 5/5   Knee Flexion 5/5 5/5   Knee Extension 5/5 5/5   Dorsiflexion  5/5 5/5   Plantarflexion 5/5 5/5   Reflexes: 2 and symmetric biceps, triceps, brachioradialis, and 1+ patellar, and achilles. No crossed adductors or spread. Toes down-going  Sensory: Vibration approximately 4 seconds at big toes bilaterally; proprioception and light touch intact  Coordination: FNF intact bilaterally without dysmetria; Normal heel-shin test bilaterally  Station/Gait: Normal width, stride length, turn, and arm swing.  Romberg negative.  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  He now has stability of ocular symptoms without noticeable ptosis and has not developed diplopia since his diagnosis.  He thinks the symptoms are quite well controlled at this time.  However, he does have cramping of the distal BLEs that has been persistent and occasionally wakes him up at night.    #Ach-R Ab+ ocular myasthenia gravis  #BLE cramping      - Labs today-check hemoglobin A1c and electrolytes/BMP given ongoing steroid use.     1. Stop the evening pyridostigmine dose. If you are not seeing a difference, you can also stop the noon/mid-day dose next week. So after 2 weeks you should be taking only 60 mg of pyridostigmine in the morning (this will probably help your cramps)    2. Prednisone:   Take 20 mg alternating with 15 mg daily for 1 month (this means: 20 on Monday, 15 Tuesday, 20 Wednesday, 15 Thursday, and so forth) then    20 mg alternating with 10 mg daily for 1 month then    20 mg alternating with 5 mg daily for 1 month, then    20 mg every other day    3. Gabapentin: Increase by 1 pill every week until you take 2 pills (600 mg) three times a day      Follow up 4 months      Patient seen and discussed with attending Dr. Page      ATTENDING ADDENDUM: Patient seen and examined with Resident Dr Mercado at the Neuromuscular Clinic on 5/4/2022. Agree with his impression and plan. Billing MDM level 4 (moderate) based on 1) One chronic disorder with progression, exacerbation, or side effects of treatment (myasthenia gravis, better on medications, but having side effects- cramps possibly related to pyridostigmine) and 2) Risk: prescription  drug management, see discussion above    Luigi Mercado MD  PGY-3 Neurology Resident  05/04/2022

## 2022-05-04 NOTE — PROGRESS NOTES
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     Regimen:  Gabapentin 300mg TID  Pyridostigmine 60mg TID  Prednisone 20mg daily - about 7-8 weeks on this dose    The most concerning portion for him in follow-up today is continued cramping of the BLEs, usually distal in the feet and calves.  This occasionally wakes him up or keeps him up at nighttime, but this is not significantly changed in severity over time.  The characteristics and quality of this are also quite similar over time.  He notes that this started after he got chemotherapy for his squamous cell carcinoma, but does not think it is significantly worsened since starting medication regimen for myasthenia gravis.  However, he is on medications that may exacerbate this and Prostigmin.    He denies any savanah ptosis that he notices in either eye.  He does notice some intermittent twitching of the left eye, usually later in the day, but this has not changed that much in the recent weeks.  He continues to deny diplopia at baseline, and denies any induced diplopia or any fatigability of the eyes.  In review of his weakness he also denies any deficits of power or strength in any of his extremities.  He has never noticed this previously and it has not been induced in recent months.    In neurologic review of systems he also endorses tremulousness of the hands, R>L, which has slightly  progressed over the course of months to years.  This is almost always present in the setting of doing tasks and is almost never present at rest.  He does not notice any tremulousness of his neck or jaw.      MG Activities of Daily Living (MG-ADL) profile  Grade 0 1 2 3   Talking Normal Intermittent slurring/nasal speech Constant slurring/nasal speech, can be understood Difficult to understand   Chewing Normal Fatigue with solid food Fatigue with soft food G tube   Swallowing Normal Rare choking Frequent choking necessitating diet changes G tube   Breathing Normal SOB with exertion SOB at rest Ventilator dependent   Ability to brush teeth/comb hair Normal Extra effort, no rest periods needed Rest periods needed Cannot do one of these   Ability to rise from chair Normal Mild impairment, uses arms sometimes Moderate impairment, always uses arms Severe impairment, requires assistance   Double vision None Present, not daily Daily, not constant Constant   Ptosis None Present, but not daily Daily, not constant Constant   Total = 2          ROS: A 10-point ROS was performed as per HPI.    PMH:  Past Medical History:   Diagnosis Date     Cancer (H)      Diabetes (H)      Diabetes mellitus (H)      Hyperlipidemia      MG (myasthenia gravis) (H)      Myasthenia gravis (H)      Past Surgical History:   Procedure Laterality Date     G TUBE REPLACEMENT  2/7/2020     IR CHEST PORT PLACEMENT > 5 YRS OF AGE  12/23/2019     IR GASTROSTOMY TUBE CHANGE  2/7/2020     IR GASTROSTOMY TUBE INSERTION  1/24/2020     IR GASTROSTOMY TUBE PERCUTANEOUS PLCMNT  1/24/2020     IR PORT PLACEMENT >5 YEARS  12/23/2019     IR PORT REMOVAL  8/17/2020     IR PORT REMOVAL RIGHT  8/17/2020     IR SITE CHECK/EVALUATION  2/7/2020     IR SITE CHECK/EVALUATION  7/9/2020     IR TUBE REMOVAL  6/26/2020     IR TUBE REMOVAL  6/26/2020     TONSILLECTOMY & ADENOIDECTOMY  aged 7     WISDOM TOOTH EXTRACTION  age 16       Allergies:  Allergies   Allergen Reactions      Penicillins Hives and Rash     Pen V K          Medications:      Current Outpatient Medications:      atorvastatin (LIPITOR) 10 MG tablet, Take 1 tablet (10 mg) by mouth At Bedtime, Disp: 90 tablet, Rfl: 0     gabapentin (NEURONTIN) 300 MG capsule, Take 1 capsule (300 mg) by mouth 3 times daily, Disp: 90 capsule, Rfl: 3     levothyroxine (SYNTHROID/LEVOTHROID) 100 MCG tablet, daily, Disp: , Rfl:      metFORMIN (GLUCOPHAGE) 500 MG tablet, Take 2 tablets (1,000 mg) by mouth 2 times daily (with meals), Disp: 360 tablet, Rfl: 1     predniSONE (DELTASONE) 10 MG tablet, 1 tablet daily for two weeks, then two tablets daily (Patient taking differently: 2 tablets daily), Disp: 60 tablet, Rfl: 1     pyridostigmine (MESTINON) 60 MG tablet, TAKE 1 TABLET BY MOUTH THREE TIMES A DAY, Disp: 180 tablet, Rfl: 0     omeprazole (PRILOSEC) 20 MG DR capsule, TAKE 1 CAPSULE BY MOUTH EVERY DAY, Disp: 90 capsule, Rfl: 1    Social History:  Social History     Tobacco Use     Smoking status: Never Smoker     Smokeless tobacco: Never Used     Tobacco comment: no passive exposure   Substance Use Topics     Alcohol use: Not Currently     Comment: rare       Family History:    Family History   Problem Relation Age of Onset     Dementia Mother      Hypertension Mother      Tremor Mother      Cancer Father      Diabetes Father      Myocardial Infarction Father      Hyperlipidemia Father      Sleep Apnea Father          Physical Examination  Vitals: /77   Pulse 67   Resp 16   Wt 93.8 kg (206 lb 11.2 oz)   SpO2 99%   BMI 31.43 kg/m      General: Alert, interactive; NAD, cooperative  HEENT: Normocephalic, atraumatic, nares patent, lower teeth missing due to resection surgery  Resp: No increased work of breathing   Cardio: RRR, S1/S2 appropriate   Extremities: Warm and well perfused, peripheral pulses present   Skin: Not jaundiced, no rash, no ecchymoses  Psych: Normal mood and affect  Neuro:   Mental status: Awake, attentive, interactive;  Recalls remote and recent history with accuracy  Speech/Language: Fluent speech without paraphasic errors; Mild dysarthria in the setting of partial edentulousness; naming, repetition, comprehension intact  Cranial nerves: Visual fields intact to confrontation, Eyes conjugate, Pupils 4mm and brisk, EOMI w/ normal and smooth pursuit, face expression symmetric, facial sensation intact and symmetric, hearing intact to conversation, shoulder shrug strong, palate rise symmetric, tongue/uvula midline.  *Strong orbicularis oculi, orbicularis oris, tongue pressure constrict, and cheek puff.  Very mild induced ptosis of right eyelid after 30 seconds looking upward, normal on the left.  Motor:   Bulk: Appropriate  Tone: Appropriate with some occasional paratonia  Spontaneous movements: No myoclonus or asterixis; otherwise appropriate at rest  Power:    Right Left   Arm abduction 5/5 5/5   Elbow Flex 5/5 5/5   Elbow Extension 5/5 5/5   Wrist Extension 5/5 5/5   FDI  5/5 5/5   APB 5/5 5/5     5/5 5/5   Hip Flexion 5/5 5/5   Knee Flexion 5/5 5/5   Knee Extension 5/5 5/5   Dorsiflexion  5/5 5/5   Plantarflexion 5/5 5/5   Reflexes: 2 and symmetric biceps, triceps, brachioradialis, and 1+ patellar, and achilles. No crossed adductors or spread. Toes down-going  Sensory: Vibration approximately 4 seconds at big toes bilaterally; proprioception and light touch intact  Coordination: FNF intact bilaterally without dysmetria; Normal heel-shin test bilaterally  Station/Gait: Normal width, stride length, turn, and arm swing.  Romberg negative.  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  He now has stability of ocular symptoms without noticeable ptosis and has not developed diplopia since his diagnosis.  He thinks the symptoms are quite well controlled at this time.  However, he does have cramping of the distal BLEs that has been persistent and occasionally wakes him up at night.    #Ach-R Ab+ ocular myasthenia gravis  #BLE cramping      - Labs today-check hemoglobin A1c and electrolytes/BMP given ongoing steroid use.     1. Stop the evening pyridostigmine dose. If you are not seeing a difference, you can also stop the noon/mid-day dose next week. So after 2 weeks you should be taking only 60 mg of pyridostigmine in the morning (this will probably help your cramps)    2. Prednisone:   Take 20 mg alternating with 15 mg daily for 1 month (this means: 20 on Monday, 15 Tuesday, 20 Wednesday, 15 Thursday, and so forth) then    20 mg alternating with 10 mg daily for 1 month then    20 mg alternating with 5 mg daily for 1 month, then    20 mg every other day    3. Gabapentin: Increase by 1 pill every week until you take 2 pills (600 mg) three times a day      Follow up 4 months      Patient seen and discussed with attending Dr. Page      ATTENDING ADDENDUM: Patient seen and examined with Resident Dr Mercado at the Neuromuscular Clinic on 5/4/2022. Agree with his impression and plan. Billing MDM level 4 (moderate) based on 1) One chronic disorder with progression, exacerbation, or side effects of treatment (myasthenia gravis, better on medications, but having side effects- cramps possibly related to pyridostigmine) and 2) Risk: prescription drug management, see discussion above    Luigi Mercado MD  PGY-3 Neurology Resident  05/04/2022

## 2022-05-04 NOTE — PATIENT INSTRUCTIONS
Labs today    Stop the evening pyridostigmine dose. If you are not seeing a difference, you can also stop the noon/mid-day dose next week. So after 2 weeks you should be taking only 60 mg of pyridostigmine in the morning (this will probably help your cramps)    2. Prednisone:   Take 20 mg alternating with 15 mg daily for 1 month (this means: 20 on Monday, 15 Tuesday, 20 Wednesday, 15 Thursday, and so forth) then    20 mg alternating with 10 mg daily for 1 month then    20 mg alternating with 5 mg daily for 1 month, then    20 mg every other day    3. Gabapentin: Increase by 1 pill every week until you take 2 pills (600 mg) three times a day    Follow up 4 months

## 2022-05-04 NOTE — NURSING NOTE
Chief Complaint   Patient presents with     RECHECK     RETURN MYASTHENIA GRAVIS - 6 wk follow up     Sabino Levy

## 2022-05-09 DIAGNOSIS — G70.00 OCULAR MYASTHENIA (H): ICD-10-CM

## 2022-05-09 DIAGNOSIS — G62.0 DRUG-INDUCED POLYNEUROPATHY (H): ICD-10-CM

## 2022-05-09 RX ORDER — PREDNISONE 10 MG/1
TABLET ORAL
Qty: 120 TABLET | Refills: 0 | Status: SHIPPED | OUTPATIENT
Start: 2022-05-09 | End: 2023-01-24

## 2022-05-09 RX ORDER — GABAPENTIN 300 MG/1
CAPSULE ORAL
Qty: 360 CAPSULE | Refills: 0 | Status: SHIPPED | OUTPATIENT
Start: 2022-05-09 | End: 2022-08-26

## 2022-05-20 DIAGNOSIS — G70.00 OCULAR MYASTHENIA (H): ICD-10-CM

## 2022-05-20 RX ORDER — PREDNISONE 10 MG/1
TABLET ORAL
Qty: 60 TABLET | Refills: 1 | OUTPATIENT
Start: 2022-05-20

## 2022-06-20 ENCOUNTER — ALLIED HEALTH/NURSE VISIT (OUTPATIENT)
Dept: FAMILY MEDICINE | Facility: CLINIC | Age: 57
End: 2022-06-20
Payer: COMMERCIAL

## 2022-06-20 DIAGNOSIS — Z23 IMMUNIZATION DUE: ICD-10-CM

## 2022-06-20 DIAGNOSIS — Z23 NEED FOR SHINGLES VACCINE: Primary | ICD-10-CM

## 2022-06-20 PROCEDURE — 90750 HZV VACC RECOMBINANT IM: CPT | Performed by: FAMILY MEDICINE

## 2022-06-20 PROCEDURE — 99207 PR NO CHARGE NURSE ONLY: CPT

## 2022-06-20 PROCEDURE — 90471 IMMUNIZATION ADMIN: CPT | Performed by: FAMILY MEDICINE

## 2022-06-21 DIAGNOSIS — G70.00 MYASTHENIA GRAVIS (H): ICD-10-CM

## 2022-06-21 RX ORDER — PYRIDOSTIGMINE BROMIDE 60 MG/1
TABLET ORAL
Qty: 180 TABLET | Refills: 0 | Status: SHIPPED | OUTPATIENT
Start: 2022-06-21 | End: 2022-07-01

## 2022-06-22 ENCOUNTER — TRANSFERRED RECORDS (OUTPATIENT)
Dept: HEALTH INFORMATION MANAGEMENT | Facility: CLINIC | Age: 57
End: 2022-06-22

## 2022-07-01 DIAGNOSIS — G70.00 MYASTHENIA GRAVIS (H): ICD-10-CM

## 2022-07-05 RX ORDER — PYRIDOSTIGMINE BROMIDE 60 MG/1
TABLET ORAL
Qty: 270 TABLET | Refills: 0 | Status: SHIPPED | OUTPATIENT
Start: 2022-07-05 | End: 2023-03-25

## 2022-07-11 ENCOUNTER — TRANSFERRED RECORDS (OUTPATIENT)
Dept: HEALTH INFORMATION MANAGEMENT | Facility: CLINIC | Age: 57
End: 2022-07-11

## 2022-08-26 ENCOUNTER — MYC REFILL (OUTPATIENT)
Dept: NEUROLOGY | Facility: CLINIC | Age: 57
End: 2022-08-26

## 2022-08-26 DIAGNOSIS — G62.0 DRUG-INDUCED POLYNEUROPATHY (H): ICD-10-CM

## 2022-08-26 RX ORDER — GABAPENTIN 300 MG/1
CAPSULE ORAL
Qty: 360 CAPSULE | Refills: 0 | Status: SHIPPED | OUTPATIENT
Start: 2022-08-26 | End: 2022-10-31

## 2022-10-23 ENCOUNTER — HEALTH MAINTENANCE LETTER (OUTPATIENT)
Age: 57
End: 2022-10-23

## 2022-10-31 DIAGNOSIS — E11.9 TYPE 2 DIABETES MELLITUS WITHOUT COMPLICATION, WITHOUT LONG-TERM CURRENT USE OF INSULIN (H): ICD-10-CM

## 2022-10-31 DIAGNOSIS — G62.0 DRUG-INDUCED POLYNEUROPATHY (H): ICD-10-CM

## 2022-10-31 RX ORDER — GABAPENTIN 300 MG/1
CAPSULE ORAL
Qty: 360 CAPSULE | Refills: 0 | Status: SHIPPED | OUTPATIENT
Start: 2022-10-31 | End: 2023-01-06

## 2022-11-01 NOTE — TELEPHONE ENCOUNTER
"Routing refill request to provider for review/approval because:  Labs not current:  A1C  Patient needs to be seen because it has been more than 6 months since last office visit.    Last Written Prescription Date:  4/6/22  Last Fill Quantity: 360,  # refills: 1   Last office visit provider:  1/11/22     Requested Prescriptions   Pending Prescriptions Disp Refills     metFORMIN (GLUCOPHAGE) 500 MG tablet 360 tablet 1     Sig: Take 2 tablets (1,000 mg) by mouth 2 times daily (with meals)       Biguanide Agents Failed - 11/1/2022  9:22 AM        Failed - Patient has documented A1c within the specified period of time.     If HgbA1C is 8 or greater, it needs to be on file within the past 3 months.  If less than 8, must be on file within the past 6 months.     Recent Labs   Lab Test 05/04/22  1043   A1C 5.8*             Failed - Recent (6 mo) or future (30 days) visit within the authorizing provider's specialty     Patient had office visit in the last 6 months or has a visit in the next 30 days with authorizing provider or within the authorizing provider's specialty.  See \"Patient Info\" tab in inbasket, or \"Choose Columns\" in Meds & Orders section of the refill encounter.            Passed - Patient is age 10 or older        Passed - Patient's CR is NOT>1.4 OR Patient's EGFR is NOT<45 within past 12 mos.     Recent Labs   Lab Test 05/04/22  1044 04/15/21  0810   GFRESTIMATED >90 >60   GFRESTBLACK  --  >60       Recent Labs   Lab Test 05/04/22  1044   CR 0.94             Passed - Patient does NOT have a diagnosis of CHF.        Passed - Medication is active on med list             Trever Joiner RN 11/01/22 9:23 AM  "

## 2022-11-30 DIAGNOSIS — E78.5 HYPERLIPIDEMIA, UNSPECIFIED HYPERLIPIDEMIA TYPE: ICD-10-CM

## 2022-12-01 RX ORDER — ATORVASTATIN CALCIUM 10 MG/1
TABLET, FILM COATED ORAL
Qty: 90 TABLET | Refills: 0 | Status: SHIPPED | OUTPATIENT
Start: 2022-12-01 | End: 2023-03-01

## 2022-12-01 NOTE — TELEPHONE ENCOUNTER
"Routing refill request to provider for review/approval because:  Labs not current:  LDL    Last Written Prescription Date:  8/30/2022  Last Fill Quantity: 90,  # refills: 0   Last office visit provider:  1/11/2022     Requested Prescriptions   Pending Prescriptions Disp Refills     atorvastatin (LIPITOR) 10 MG tablet [Pharmacy Med Name: ATORVASTATIN 10MG TABLETS] 90 tablet 0     Sig: TAKE 1 TABLET(10 MG) BY MOUTH AT BEDTIME       Statins Protocol Failed - 11/30/2022  3:39 AM        Failed - LDL on file in past 12 months     Recent Labs   Lab Test 04/15/21  0810   LDL 50             Passed - No abnormal creatine kinase in past 12 months     Recent Labs   Lab Test 04/15/21  0810   CKT 90                Passed - Recent (12 mo) or future (30 days) visit within the authorizing provider's specialty     Patient has had an office visit with the authorizing provider or a provider within the authorizing providers department within the previous 12 mos or has a future within next 30 days. See \"Patient Info\" tab in inbasket, or \"Choose Columns\" in Meds & Orders section of the refill encounter.              Passed - Medication is active on med list        Passed - Patient is age 18 or older             Evie Olson RN 11/30/22 11:37 PM  "

## 2022-12-10 ENCOUNTER — HEALTH MAINTENANCE LETTER (OUTPATIENT)
Age: 57
End: 2022-12-10

## 2023-01-06 DIAGNOSIS — G62.0 DRUG-INDUCED POLYNEUROPATHY (H): ICD-10-CM

## 2023-01-06 RX ORDER — GABAPENTIN 300 MG/1
CAPSULE ORAL
Qty: 180 CAPSULE | Refills: 0 | Status: SHIPPED | OUTPATIENT
Start: 2023-01-06 | End: 2023-02-13

## 2023-01-16 ENCOUNTER — LAB (OUTPATIENT)
Dept: LAB | Facility: CLINIC | Age: 58
End: 2023-01-16
Payer: COMMERCIAL

## 2023-01-16 ENCOUNTER — OFFICE VISIT (OUTPATIENT)
Dept: NEUROLOGY | Facility: CLINIC | Age: 58
End: 2023-01-16
Payer: COMMERCIAL

## 2023-01-16 VITALS
BODY MASS INDEX: 32.63 KG/M2 | OXYGEN SATURATION: 97 % | WEIGHT: 214.6 LBS | SYSTOLIC BLOOD PRESSURE: 132 MMHG | HEART RATE: 74 BPM | DIASTOLIC BLOOD PRESSURE: 82 MMHG

## 2023-01-16 DIAGNOSIS — T38.0X5A STEROID-INDUCED OSTEOPOROSIS: Primary | ICD-10-CM

## 2023-01-16 DIAGNOSIS — G25.0 ESSENTIAL TREMOR: ICD-10-CM

## 2023-01-16 DIAGNOSIS — G70.00 MYASTHENIA GRAVIS WITHOUT EXACERBATION (H): ICD-10-CM

## 2023-01-16 DIAGNOSIS — M81.8 STEROID-INDUCED OSTEOPOROSIS: Primary | ICD-10-CM

## 2023-01-16 DIAGNOSIS — Z79.52 CURRENT CHRONIC USE OF SYSTEMIC STEROIDS: ICD-10-CM

## 2023-01-16 LAB
ANION GAP SERPL CALCULATED.3IONS-SCNC: 10 MMOL/L (ref 7–15)
BUN SERPL-MCNC: 21.8 MG/DL (ref 6–20)
CALCIUM SERPL-MCNC: 9.8 MG/DL (ref 8.6–10)
CHLORIDE SERPL-SCNC: 106 MMOL/L (ref 98–107)
CREAT SERPL-MCNC: 1.02 MG/DL (ref 0.67–1.17)
DEPRECATED HCO3 PLAS-SCNC: 24 MMOL/L (ref 22–29)
GFR SERPL CREATININE-BSD FRML MDRD: 85 ML/MIN/1.73M2
GLUCOSE SERPL-MCNC: 109 MG/DL (ref 70–99)
HBA1C MFR BLD: 5.9 %
POTASSIUM SERPL-SCNC: 4.5 MMOL/L (ref 3.4–5.3)
SODIUM SERPL-SCNC: 140 MMOL/L (ref 136–145)

## 2023-01-16 PROCEDURE — 99214 OFFICE O/P EST MOD 30 MIN: CPT | Performed by: PSYCHIATRY & NEUROLOGY

## 2023-01-16 PROCEDURE — 83036 HEMOGLOBIN GLYCOSYLATED A1C: CPT | Mod: 90 | Performed by: PATHOLOGY

## 2023-01-16 PROCEDURE — 36415 COLL VENOUS BLD VENIPUNCTURE: CPT | Performed by: PATHOLOGY

## 2023-01-16 PROCEDURE — 80048 BASIC METABOLIC PNL TOTAL CA: CPT | Performed by: PATHOLOGY

## 2023-01-16 PROCEDURE — 99000 SPECIMEN HANDLING OFFICE-LAB: CPT | Performed by: PATHOLOGY

## 2023-01-16 RX ORDER — PREDNISONE 10 MG/1
TABLET ORAL
Qty: 90 TABLET | Refills: 1 | Status: SHIPPED | OUTPATIENT
Start: 2023-01-16 | End: 2023-06-20

## 2023-01-16 ASSESSMENT — PAIN SCALES - GENERAL: PAINLEVEL: NO PAIN (0)

## 2023-01-16 NOTE — LETTER
2023       RE: Cj Morales  [address] [place] [address]     Dear Colleague,    Thank you for referring your patient, Cj Morales, to the [facility]. Please see a copy of my visit note below.    Service Date: 2023    Israel Quiroz MD  [address], [address]    RE:  Cj Morales  MRN:  [id number]  :  1965    Dear Dr. Quiroz:      I had the pleasure to see Mr. 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  He also has type 2 diabetes and a history of squamous cell carcinoma of the tongue, status post chemoradiation with completion in .  There is no recent recurrence of his cancer.  He also has leg cramps for which he is on gabapentin 600 mg t.i.d.  The cramps are much better now.  The pyridostigmine dose has be reduced to 1 a day and this has clearly helped.  The patient was on prednisone 20 mg daily until the last visit with me in 2022, and then I recommended a gradual taper down to 20 mg every other day.  I never recommended stopping the prednisone, but he had an appointment with me in 2022 that had to be rescheduled, and the prednisone was not refilled for unclear reasons, so he stopped it 4 months ago.      He reports mild ptosis later towards the day, which happens occasionally and not daily; it is not much changed from the last visit.  There is still no diplopia.  He has, however, some dysphagia.  We were a little ambivalent whether the dysphagia was a complication of radiation therapy for the cancer or myasthenia related.  This dysphagia is generally worse in the morning and he tends to swallow phlegm quite a bit.  He does have choking that occurs almost on a daily basis and  he has to be careful what he eats and changes his food consistencies to avoid very hard food or large chunks.  He does not have any fatigue of his jaw or difficulty chewing.  He denies slurred speech, although his voice gets a little hoarse and his speech despite denying it subjectively, sounds nasal to me. He denies shortness of breath on exertion.  There is no fatigue of his arms when brushing teeth or combing hair.  No fatigue of his legs and no need to use his arms when getting up from a chair.      His MG-ADL score is 4 today, 1 for the ptosis, 2 for the dysphagia and 1 for the nasal speech, which is somewhat worse from the last visit.  He is not on any other immunosuppression right now.    /82 (BP Location: Right arm, Patient Position: Sitting, Cuff Size: Adult Large)   Pulse 74   Wt 97.3 kg (214 lb 9.6 oz)   SpO2 97%   BMI 32.63 kg/m      On exam, he is awake, alert and oriented x3.  He does have mild right, more than left, eyelid ptosis that is aggravated after 1 minute of sustained upward gaze with slight fatigue of the ptosis.  The same occurs with lateral eye movements.  He also gets mild diplopia after about 20-25 seconds of sustained upward gaze.  No diplopia with horizontal gaze to right, left or downwards and ductions and versions of the eyes are normal.  There is no clear weakness of orbicularis oculi or auris.  Cheek puff is fair.  Strength of jaw opening is normal.  Tongue shows no atrophy or fasciculations and strong lateral movements.  Uvula and palate elevate well at midline.  Neck flexion and extension strength are full.  His speech, however, is hypernasal.  His strength is 5/5 in upper and lower extremities proximally and distally.    He has a fine high frequency postural>kinetic tremor of the right>left hand. No resting tremor and no dysmetria on FTN.    In summary, Mr. Morales has myasthenia gravis, acetylcholine receptor antibody positive.  I don't think this is purely ocular MG,  because he has a nasal quality of his speech and some dysphagia.  It is not entirely clear that those are related to myasthenia versus radiation therapy that he received previously for head/neck cancer, but it is possible that it is myasthenia related.  Therefore, I would like him to restart prednisone.  It was never my plan to completely stop it.  He should try 10 mg every other day for 2 weeks and then 20 mg every other day.  I am not going to increase it further, but I will reassess him in 3 months to see if this has any impact on the symptoms that he is still complaining about.  I will also order a bone density scan to screen for osteoporosis and repeat a lab check for hemoglobin A1c and basic metabolic panel.  I encouraged him to take vitamin D 1000 to 2000 units daily to reduce the risk of steroid-induced osteoporosis.  He will return to clinic for followup in 3 months or sooner if needed.      He has a mild essential tremor. It does not create any major impairment of ADLs at this point, so we will simply observe it as opposed to treating it with beta blockers or primidone.  He agrees with this plan.  Follow up as above.    Billing MDM level 4 (Moderate) based on 1) Problems assessed: Two stable chronic illnesses (MG, and essential tremor), and 2) Risk: prescription drug management (see above discussion).      D: 2023   T: 2023   MT: macie    Name:     MARISOL VILLALOBOS  MRN:      [id number]        Account:      [id number]   :      1965           Service Date: 2023       Document: [id number]        Again, thank you for allowing me to participate in the care of your patient.      Sincerely,    Eliud Page MD

## 2023-01-16 NOTE — PROGRESS NOTES
Service Date: 2023    Israel Quiroz MD  [address], [address]    RE:  Cj Morales  MRN:  [id number]  :  1965    Dear Dr. Quiroz:      I had the pleasure to see Mr. Morales in followup at the 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  He also has type 2 diabetes and a history of squamous cell carcinoma of the tongue, status post chemoradiation with completion in .  There is no recent recurrence of his cancer.  He also has leg cramps for which he is on gabapentin 600 mg t.i.d.  The cramps are much better now.  The pyridostigmine dose has be reduced to 1 a day and this has clearly helped.  The patient was on prednisone 20 mg daily until the last visit with me in 2022, and then I recommended a gradual taper down to 20 mg every other day.  I never recommended stopping the prednisone, but he had an appointment with me in 2022 that had to be rescheduled, and the prednisone was not refilled for unclear reasons, so he stopped it 4 months ago.      He reports mild ptosis later towards the day, which happens occasionally and not daily; it is not much changed from the last visit.  There is still no diplopia.  He has, however, some dysphagia.  We were a little ambivalent whether the dysphagia was a complication of radiation therapy for the cancer or myasthenia related.  This dysphagia is generally worse in the morning and he tends to swallow phlegm quite a bit.  He does have choking that occurs almost on a daily basis and he has to be careful what he eats and changes his food consistencies to avoid very hard food or large chunks.  He does not have any fatigue of his jaw or difficulty chewing.  He denies slurred speech, although his voice gets a little hoarse and his speech despite denying it subjectively, sounds nasal to me. He denies  shortness of breath on exertion.  There is no fatigue of his arms when brushing teeth or combing hair.  No fatigue of his legs and no need to use his arms when getting up from a chair.      His MG-ADL score is 4 today, 1 for the ptosis, 2 for the dysphagia and 1 for the nasal speech, which is somewhat worse from the last visit.  He is not on any other immunosuppression right now.    /82 (BP Location: Right arm, Patient Position: Sitting, Cuff Size: Adult Large)   Pulse 74   Wt 97.3 kg (214 lb 9.6 oz)   SpO2 97%   BMI 32.63 kg/m      On exam, he is awake, alert and oriented x3.  He does have mild right, more than left, eyelid ptosis that is aggravated after 1 minute of sustained upward gaze with slight fatigue of the ptosis.  The same occurs with lateral eye movements.  He also gets mild diplopia after about 20-25 seconds of sustained upward gaze.  No diplopia with horizontal gaze to right, left or downwards and ductions and versions of the eyes are normal.  There is no clear weakness of orbicularis oculi or auris.  Cheek puff is fair.  Strength of jaw opening is normal.  Tongue shows no atrophy or fasciculations and strong lateral movements.  Uvula and palate elevate well at midline.  Neck flexion and extension strength are full.  His speech, however, is hypernasal.  His strength is 5/5 in upper and lower extremities proximally and distally.    He has a fine high frequency postural>kinetic tremor of the right>left hand. No resting tremor and no dysmetria on FTN.    In summary, Mr. Morales has myasthenia gravis, acetylcholine receptor antibody positive.  I don't think this is purely ocular MG, because he has a nasal quality of his speech and some dysphagia.  It is not entirely clear that those are related to myasthenia versus radiation therapy that he received previously for head/neck cancer, but it is possible that it is myasthenia related.  Therefore, I would like him to restart prednisone.  It was never  my plan to completely stop it.  He should try 10 mg every other day for 2 weeks and then 20 mg every other day.  I am not going to increase it further, but I will reassess him in 3 months to see if this has any impact on the symptoms that he is still complaining about.  I will also order a bone density scan to screen for osteoporosis and repeat a lab check for hemoglobin A1c and basic metabolic panel.  I encouraged him to take vitamin D 1000 to 2000 units daily to reduce the risk of steroid-induced osteoporosis.  He will return to clinic for followup in 3 months or sooner if needed.      He has a mild essential tremor. It does not create any major impairment of ADLs at this point, so we will simply observe it as opposed to treating it with beta blockers or primidone.  He agrees with this plan.  Follow up as above.    Billing MDM level 4 (Moderate) based on 1) Problems assessed: Two stable chronic illnesses (MG, and essential tremor), and 2) Risk: prescription drug management (see above discussion).    Sincerely,    Eliud Page MD        D: 2023   T: 2023   MT: macie    Name:     MARISLO VILLALOBOS  MRN:      [id number]        Account:      [id number]   :      1965           Service Date: 2023       Document: [id number]

## 2023-01-16 NOTE — PATIENT INSTRUCTIONS
Restart prednisone 10 mg every other day for two weeks, then 20 mg every other day until you return to Clinic  (Do not stop it)  No changes in pyridostigmine or gabapentin    Your tremor is likely essential- this is a common, benign neurological problem. Let's monitor it for now    Will get some blood tests today and schedule a bone density scan for osteoporosis    Recommend taking vitamin D 9900-7438 units a day over the counter.    Follow up 3 months

## 2023-01-17 ASSESSMENT — ENCOUNTER SYMPTOMS
MYALGIAS: 0
ARTHRALGIAS: 0
COUGH: 0
CONSTIPATION: 0
HEADACHES: 0
NAUSEA: 0
SHORTNESS OF BREATH: 0
PARESTHESIAS: 0
FREQUENCY: 0
DIARRHEA: 0
EYE PAIN: 0
PALPITATIONS: 0
HEARTBURN: 0
FEVER: 0
HEMATURIA: 0
JOINT SWELLING: 0
DYSURIA: 0
CHILLS: 0
WEAKNESS: 0
ABDOMINAL PAIN: 0
DIZZINESS: 0
HEMATOCHEZIA: 0
NERVOUS/ANXIOUS: 0
SORE THROAT: 0

## 2023-01-24 ENCOUNTER — TRANSFERRED RECORDS (OUTPATIENT)
Dept: HEALTH INFORMATION MANAGEMENT | Facility: CLINIC | Age: 58
End: 2023-01-24

## 2023-01-24 ENCOUNTER — OFFICE VISIT (OUTPATIENT)
Dept: FAMILY MEDICINE | Facility: CLINIC | Age: 58
End: 2023-01-24
Payer: COMMERCIAL

## 2023-01-24 VITALS
TEMPERATURE: 98.4 F | HEIGHT: 69 IN | BODY MASS INDEX: 31.84 KG/M2 | SYSTOLIC BLOOD PRESSURE: 126 MMHG | OXYGEN SATURATION: 98 % | WEIGHT: 215 LBS | RESPIRATION RATE: 18 BRPM | DIASTOLIC BLOOD PRESSURE: 64 MMHG | HEART RATE: 70 BPM

## 2023-01-24 DIAGNOSIS — G70.00 MYASTHENIA GRAVIS (H): ICD-10-CM

## 2023-01-24 DIAGNOSIS — E83.42 HYPOMAGNESEMIA: ICD-10-CM

## 2023-01-24 DIAGNOSIS — E11.9 TYPE 2 DIABETES MELLITUS WITHOUT COMPLICATION, WITHOUT LONG-TERM CURRENT USE OF INSULIN (H): Primary | ICD-10-CM

## 2023-01-24 DIAGNOSIS — E03.9 HYPOTHYROIDISM, UNSPECIFIED TYPE: ICD-10-CM

## 2023-01-24 DIAGNOSIS — Z00.00 HEALTHCARE MAINTENANCE: ICD-10-CM

## 2023-01-24 DIAGNOSIS — C14.0 MALIGNANT NEOPLASM OF PHARYNX (H): ICD-10-CM

## 2023-01-24 PROBLEM — Z93.1 COMPLAINT ASSOCIATED WITH GASTRIC TUBE (H): Status: ACTIVE | Noted: 2023-01-24

## 2023-01-24 PROBLEM — R68.89 COMPLAINT ASSOCIATED WITH GASTRIC TUBE (H): Status: RESOLVED | Noted: 2023-01-24 | Resolved: 2023-01-24

## 2023-01-24 PROBLEM — R68.89 COMPLAINT ASSOCIATED WITH GASTRIC TUBE (H): Status: ACTIVE | Noted: 2023-01-24

## 2023-01-24 PROBLEM — Z93.1 COMPLAINT ASSOCIATED WITH GASTRIC TUBE (H): Status: RESOLVED | Noted: 2023-01-24 | Resolved: 2023-01-24

## 2023-01-24 LAB
ALT SERPL W P-5'-P-CCNC: 18 U/L (ref 10–50)
ANION GAP SERPL CALCULATED.3IONS-SCNC: 15 MMOL/L (ref 7–15)
BUN SERPL-MCNC: 21.7 MG/DL (ref 6–20)
CALCIUM SERPL-MCNC: 10.3 MG/DL (ref 8.6–10)
CHLORIDE SERPL-SCNC: 101 MMOL/L (ref 98–107)
CREAT SERPL-MCNC: 1.02 MG/DL (ref 0.67–1.17)
CREAT UR-MCNC: 84.4 MG/DL
DEPRECATED HCO3 PLAS-SCNC: 21 MMOL/L (ref 22–29)
GFR SERPL CREATININE-BSD FRML MDRD: 85 ML/MIN/1.73M2
GLUCOSE SERPL-MCNC: 134 MG/DL (ref 70–99)
HBA1C MFR BLD: 6.2 % (ref 0–5.6)
LDLC SERPL DIRECT ASSAY-MCNC: 70 MG/DL
MICROALBUMIN UR-MCNC: <12 MG/L
MICROALBUMIN/CREAT UR: NORMAL MG/G{CREAT}
POTASSIUM SERPL-SCNC: 4.4 MMOL/L (ref 3.4–5.3)
SODIUM SERPL-SCNC: 137 MMOL/L (ref 136–145)
TSH SERPL DL<=0.005 MIU/L-ACNC: 2.17 UIU/ML (ref 0.3–4.2)

## 2023-01-24 PROCEDURE — 82570 ASSAY OF URINE CREATININE: CPT | Performed by: FAMILY MEDICINE

## 2023-01-24 PROCEDURE — 99396 PREV VISIT EST AGE 40-64: CPT | Mod: 25 | Performed by: FAMILY MEDICINE

## 2023-01-24 PROCEDURE — 91313 COVID-19 VACCINE BIVALENT BOOSTER 18+ (MODERNA): CPT | Performed by: FAMILY MEDICINE

## 2023-01-24 PROCEDURE — 83721 ASSAY OF BLOOD LIPOPROTEIN: CPT | Performed by: FAMILY MEDICINE

## 2023-01-24 PROCEDURE — 82043 UR ALBUMIN QUANTITATIVE: CPT | Performed by: FAMILY MEDICINE

## 2023-01-24 PROCEDURE — 83036 HEMOGLOBIN GLYCOSYLATED A1C: CPT | Performed by: FAMILY MEDICINE

## 2023-01-24 PROCEDURE — 36415 COLL VENOUS BLD VENIPUNCTURE: CPT | Performed by: FAMILY MEDICINE

## 2023-01-24 PROCEDURE — 90677 PCV20 VACCINE IM: CPT | Performed by: FAMILY MEDICINE

## 2023-01-24 PROCEDURE — 0134A COVID-19 VACCINE BIVALENT BOOSTER 18+ (MODERNA): CPT | Performed by: FAMILY MEDICINE

## 2023-01-24 PROCEDURE — 80048 BASIC METABOLIC PNL TOTAL CA: CPT | Performed by: FAMILY MEDICINE

## 2023-01-24 PROCEDURE — 90471 IMMUNIZATION ADMIN: CPT | Performed by: FAMILY MEDICINE

## 2023-01-24 PROCEDURE — 84443 ASSAY THYROID STIM HORMONE: CPT | Performed by: FAMILY MEDICINE

## 2023-01-24 PROCEDURE — 99213 OFFICE O/P EST LOW 20 MIN: CPT | Mod: 25 | Performed by: FAMILY MEDICINE

## 2023-01-24 PROCEDURE — 84460 ALANINE AMINO (ALT) (SGPT): CPT | Performed by: FAMILY MEDICINE

## 2023-01-24 NOTE — PROGRESS NOTES
Adult Male Physical  A/P  Type 2 diabetes mellitus (H)  A1c done recently was 5.9.  On metformin  Atorvastatin 10 mg  Up-to-date ophthalmology, will make an appointment for the near future too        Myasthenia gravis (H)  Pyridostigmine and prednisone    Malignant neoplasm of pharynx (H)  Follows with  and [facility].  CT scan this morning    Hypothyroidism, unspecified type  100 mcg levothyroxine, check TSH    Hypomagnesemia  Recheck magnesium    Healthcare maintenance  Healthcare maintenance assessment  Immunization-second Pneumovax, COVID booster, had flu shot  Cancer screening-discussed PSA, will hold off for now, strongly encouraged him to schedule colonoscopy  Vascular-atorvastatin, diabetes.  Blood pressure controlled, patient has a strong family history                 HPI  Current Concerns/ Questions  Patient doing well, no questions.  Had CT scan to follow-up on pharyngeal carcinoma this morning.  Following with neurology for myasthenia gravis.  PFSH:  Current medications reviewed as follows:  atorvastatin (LIPITOR) 10 MG tablet, TAKE 1 TABLET(10 MG) BY MOUTH AT BEDTIME  gabapentin (NEURONTIN) 300 MG capsule, 2 capsules, 3x daily  levothyroxine (SYNTHROID/LEVOTHROID) 100 MCG tablet, daily  metFORMIN (GLUCOPHAGE) 500 MG tablet, Take 2 tablets (1,000 mg) by mouth 2 times daily (with meals)  predniSONE (DELTASONE) 10 MG tablet, 1 tablet every other day for 2 weeks, then two tablets every other day until you come back to Clinic. Do not stop it  predniSONE (DELTASONE) 10 MG tablet, Alternate 20 mg with 15 mg every other day x 1 month, then 20 mg with 10 mg every other day x 1 month  pyridostigmine (MESTINON) 60 MG tablet, TAKE 1 TABLET BY MOUTH THREE TIMES A DAY  omeprazole (PRILOSEC) 20 MG DR capsule, TAKE 1 CAPSULE BY MOUTH EVERY DAY    No current facility-administered medications on file prior to visit.     Patient Active Problem List   Diagnosis     Dehydration     Dysphagia     Essential tremor      Healthcare maintenance     Hyperlipidemia     Lymphadenopathy     Malignant neoplasm of pharynx (H)     Myasthenia gravis (H)     Obesity     Physiological tremor     Rash and nonspecific skin eruption     Type 2 diabetes mellitus (H)     Drug-induced polyneuropathy (H)     Hypothyroidism, unspecified type     Past Medical History:   Diagnosis Date     Cancer (H)      Diabetes (H)      Diabetes mellitus (H)      Hyperlipidemia      MG (myasthenia gravis) (H)      Myasthenia gravis (H)      Past Surgical History:   Procedure Laterality Date     G TUBE REPLACEMENT  2/7/2020     IR CHEST PORT PLACEMENT > 5 YRS OF AGE  12/23/2019     IR GASTROSTOMY TUBE CHANGE  2/7/2020     IR GASTROSTOMY TUBE INSERTION  1/24/2020     IR GASTROSTOMY TUBE PERCUTANEOUS PLCMNT  1/24/2020     IR PORT PLACEMENT >5 YEARS  12/23/2019     IR PORT REMOVAL  8/17/2020     IR PORT REMOVAL RIGHT  8/17/2020     IR SITE CHECK/EVALUATION  2/7/2020     IR SITE CHECK/EVALUATION  7/9/2020     IR TUBE REMOVAL  6/26/2020     IR TUBE REMOVAL  6/26/2020     TONSILLECTOMY & ADENOIDECTOMY  aged 7     WISDOM TOOTH EXTRACTION  age 16     Family History   Problem Relation Age of Onset     Dementia Mother      Hypertension Mother      Tremor Mother      Cancer Father      Diabetes Father      Myocardial Infarction Father      Hyperlipidemia Father      Sleep Apnea Father      History   Smoking Status     Never   Smokeless Tobacco     Never     Comment: no passive exposure       Social History     Social History Narrative     Not on file     Immunization History   Administered Date(s) Administered     COVID-19 Vaccine 18+ (Moderna) 02/25/2021, 03/25/2021, 11/07/2021, 04/29/2022     Influenza Vaccine 50-64 or 18-64 w/egg allergy (Flublok) 09/02/2020     Influenza Vaccine >6 months (Alfuria,Fluzone) 11/07/2021     Pneumococcal 23 valent 01/11/2022     Tdap (Adacel,Boostrix) 12/22/2014     Zoster vaccine recombinant adjuvanted (SHINGRIX) 01/11/2022, 06/20/2022     Body  "mass index is 32.21 kg/m .    ROS  As above    Objective  Physical Exam  Vitals:    01/24/23 1122   BP: 126/64   BP Location: Right arm   Patient Position: Sitting   Cuff Size: Adult Regular   Pulse: 70   Resp: 18   Temp: 98.4  F (36.9  C)   TempSrc: Oral   SpO2: 98%   Weight: 97.5 kg (215 lb)   Height: 1.74 m (5' 8.5\")       Gen- alert and oriented x3, no acute distress  HEENT- Normocephalic atraumatic   pupils equal and reactive, EOMI.    TMs visualized and normal, ear canals normal.    Mouth moist with normal mucosa no ulceration, dentition normal or in good repair.  Neck- supple, no adenopathy or thyromegaly  Chest- Normal chest wall apperance, normal inspiration and expiration.  Clear to asculation.  CV- Regular rate and rhythm, normal tones, no murmus, gallops or rubs.  Abd-  Soft, nodistended, nontender.  Normal bowel sounds, no mass or organ enlargement.  Normal prostate exam  Ext- Atraumatic,  No synovial thickening. Good perfusion, no edema. Periph pulses detected  Skin- warm and dry, no rash  Neuro- Cranial nerves grossly intact.  Normal gait, normal strength.  Reflexes symmetric.  Coordination intact.    Diagnostics  Results for orders placed or performed in visit on 01/24/23   TSH     Status: Normal   Result Value Ref Range    TSH 2.17 0.30 - 4.20 uIU/mL   ALT     Status: Normal   Result Value Ref Range    ALT 18 10 - 50 U/L   Basic metabolic panel     Status: Abnormal   Result Value Ref Range    Sodium 137 136 - 145 mmol/L    Potassium 4.4 3.4 - 5.3 mmol/L    Chloride 101 98 - 107 mmol/L    Carbon Dioxide (CO2) 21 (L) 22 - 29 mmol/L    Anion Gap 15 7 - 15 mmol/L    Urea Nitrogen 21.7 (H) 6.0 - 20.0 mg/dL    Creatinine 1.02 0.67 - 1.17 mg/dL    Calcium 10.3 (H) 8.6 - 10.0 mg/dL    Glucose 134 (H) 70 - 99 mg/dL    GFR Estimate 85 >60 mL/min/1.73m2   Hemoglobin A1c     Status: Abnormal   Result Value Ref Range    Hemoglobin A1C 6.2 (H) 0.0 - 5.6 %   LDL cholesterol direct     Status: Normal   Result Value " Ref Range    LDL Cholesterol Direct 70 <100 mg/dL   Albumin Random Urine Quantitative with Creat Ratio     Status: None   Result Value Ref Range    Creatinine Urine mg/dL 84.4 mg/dL    Albumin Urine mg/L <12.0 mg/L    Albumin Urine mg/g Cr                       Answers for HPI/ROS submitted by the patient on 1/17/2023  Frequency of exercise:: 2-3 days/week  Getting at least 3 servings of Calcium per day:: Yes  Diet:: Regular (no restrictions)  Taking medications regularly:: Yes  Medication side effects:: None  Bi-annual eye exam:: Yes  Dental care twice a year:: NO  Sleep apnea or symptoms of sleep apnea:: None  abdominal pain: No  Blood in stool: No  Blood in urine: No  chest pain: No  chills: No  congestion: No  constipation: No  cough: No  diarrhea: No  dizziness: No  ear pain: No  eye pain: No  nervous/anxious: No  fever: No  frequency: No  genital sores: No  headaches: No  hearing loss: No  heartburn: No  arthralgias: No  joint swelling: No  peripheral edema: No  mood changes: No  myalgias: No  nausea: No  dysuria: No  palpitations: No  Skin sensation changes: No  sore throat: No  urgency: No  rash: No  shortness of breath: No  visual disturbance: No  weakness: No  impotence: Yes  penile discharge: No  Additional concerns today:: No  Duration of exercise:: 15-30 minutes

## 2023-01-24 NOTE — ASSESSMENT & PLAN NOTE
Healthcare maintenance assessment  Immunization-second Pneumovax, COVID booster, had flu shot  Cancer screening-discussed PSA, will hold off for now, strongly encouraged him to schedule colonoscopy  Vascular-atorvastatin, diabetes.  Blood pressure controlled, patient has a strong family history

## 2023-01-24 NOTE — ASSESSMENT & PLAN NOTE
A1c done recently was 5.9.  On metformin  Atorvastatin 10 mg  Up-to-date ophthalmology, will make an appointment for the near future too

## 2023-01-25 DIAGNOSIS — E83.52 SERUM CALCIUM ELEVATED: Primary | ICD-10-CM

## 2023-02-01 ENCOUNTER — TRANSFERRED RECORDS (OUTPATIENT)
Dept: HEALTH INFORMATION MANAGEMENT | Facility: CLINIC | Age: 58
End: 2023-02-01

## 2023-02-01 LAB — RETINOPATHY: NEGATIVE

## 2023-02-04 DIAGNOSIS — E11.9 TYPE 2 DIABETES MELLITUS WITHOUT COMPLICATION, WITHOUT LONG-TERM CURRENT USE OF INSULIN (H): ICD-10-CM

## 2023-02-05 NOTE — TELEPHONE ENCOUNTER
"Last Written Prescription Date:  11/1/22  Last Fill Quantity: 360,  # refills: 0   Last office visit provider:  1/24/23     Requested Prescriptions   Pending Prescriptions Disp Refills     metFORMIN (GLUCOPHAGE) 500 MG tablet [Pharmacy Med Name: METFORMIN 500MG TABLETS] 360 tablet 0     Sig: TAKE 2 TABLETS(1000 MG) BY MOUTH TWICE DAILY WITH MEALS       Biguanide Agents Passed - 2/4/2023  3:20 AM        Passed - Patient is age 10 or older        Passed - Patient has documented A1c within the specified period of time.     If HgbA1C is 8 or greater, it needs to be on file within the past 3 months.  If less than 8, must be on file within the past 6 months.     Recent Labs   Lab Test 01/24/23  1135   A1C 6.2*             Passed - Patient's CR is NOT>1.4 OR Patient's EGFR is NOT<45 within past 12 mos.     Recent Labs   Lab Test 01/24/23  1135 05/04/22  1044 04/15/21  0810   GFRESTIMATED 85   < > >60   GFRESTBLACK  --   --  >60    < > = values in this interval not displayed.       Recent Labs   Lab Test 01/24/23  1135   CR 1.02             Passed - Patient does NOT have a diagnosis of CHF.        Passed - Medication is active on med list        Passed - Recent (6 mo) or future (30 days) visit within the authorizing provider's specialty     Patient had office visit in the last 6 months or has a visit in the next 30 days with authorizing provider or within the authorizing provider's specialty.  See \"Patient Info\" tab in inbasket, or \"Choose Columns\" in Meds & Orders section of the refill encounter.                 Sandy Rose, RN 02/05/23 5:38 PM  "

## 2023-02-13 DIAGNOSIS — G62.0 DRUG-INDUCED POLYNEUROPATHY (H): ICD-10-CM

## 2023-02-13 RX ORDER — GABAPENTIN 300 MG/1
CAPSULE ORAL
Qty: 180 CAPSULE | Refills: 0 | Status: SHIPPED | OUTPATIENT
Start: 2023-02-13 | End: 2023-03-20

## 2023-02-14 DIAGNOSIS — E03.9 HYPOTHYROIDISM, UNSPECIFIED TYPE: Primary | ICD-10-CM

## 2023-02-14 RX ORDER — LEVOTHYROXINE SODIUM 100 UG/1
100 TABLET ORAL DAILY
Qty: 90 TABLET | Refills: 3 | Status: SHIPPED | OUTPATIENT
Start: 2023-02-14 | End: 2024-01-23

## 2023-02-15 ENCOUNTER — ANCILLARY PROCEDURE (OUTPATIENT)
Dept: BONE DENSITY | Facility: CLINIC | Age: 58
End: 2023-02-15
Attending: PSYCHIATRY & NEUROLOGY
Payer: COMMERCIAL

## 2023-02-15 DIAGNOSIS — T38.0X5A STEROID-INDUCED OSTEOPOROSIS: ICD-10-CM

## 2023-02-15 DIAGNOSIS — M81.8 STEROID-INDUCED OSTEOPOROSIS: ICD-10-CM

## 2023-02-15 PROCEDURE — 77080 DXA BONE DENSITY AXIAL: CPT | Mod: TC | Performed by: RADIOLOGY

## 2023-02-24 ENCOUNTER — TRANSFERRED RECORDS (OUTPATIENT)
Dept: HEALTH INFORMATION MANAGEMENT | Facility: CLINIC | Age: 58
End: 2023-02-24

## 2023-03-01 DIAGNOSIS — E78.5 HYPERLIPIDEMIA, UNSPECIFIED HYPERLIPIDEMIA TYPE: ICD-10-CM

## 2023-03-01 RX ORDER — ATORVASTATIN CALCIUM 10 MG/1
TABLET, FILM COATED ORAL
Qty: 90 TABLET | Refills: 3 | Status: SHIPPED | OUTPATIENT
Start: 2023-03-01 | End: 2024-03-06

## 2023-03-02 NOTE — TELEPHONE ENCOUNTER
"Last Written Prescription Date:  12/1/22  Last Fill Quantity: 90,  # refills: 0   Last office visit provider:  1/24/23     Requested Prescriptions   Pending Prescriptions Disp Refills     atorvastatin (LIPITOR) 10 MG tablet [Pharmacy Med Name: ATORVASTATIN 10MG TABLETS] 90 tablet 0     Sig: TAKE 1 TABLET(10 MG) BY MOUTH AT BEDTIME       Statins Protocol Passed - 3/1/2023  3:22 AM        Passed - LDL on file in past 12 months     Recent Labs   Lab Test 01/24/23  1135   LDL 70             Passed - No abnormal creatine kinase in past 12 months     Recent Labs   Lab Test 04/15/21  0810   CKT 90                Passed - Recent (12 mo) or future (30 days) visit within the authorizing provider's specialty     Patient has had an office visit with the authorizing provider or a provider within the authorizing providers department within the previous 12 mos or has a future within next 30 days. See \"Patient Info\" tab in inbasket, or \"Choose Columns\" in Meds & Orders section of the refill encounter.              Passed - Medication is active on med list        Passed - Patient is age 18 or older             Sandy Rose, RN 03/01/23 7:16 PM  "

## 2023-03-15 RX ORDER — ALENDRONATE SODIUM 35 MG/1
35 TABLET ORAL
Qty: 12 TABLET | Refills: 1 | Status: SHIPPED | OUTPATIENT
Start: 2023-03-15 | End: 2023-09-08

## 2023-03-20 DIAGNOSIS — G62.0 DRUG-INDUCED POLYNEUROPATHY (H): ICD-10-CM

## 2023-03-20 RX ORDER — GABAPENTIN 300 MG/1
CAPSULE ORAL
Qty: 180 CAPSULE | Refills: 0 | Status: SHIPPED | OUTPATIENT
Start: 2023-03-20 | End: 2023-04-21

## 2023-03-25 ENCOUNTER — MYC REFILL (OUTPATIENT)
Dept: NEUROLOGY | Facility: CLINIC | Age: 58
End: 2023-03-25
Payer: COMMERCIAL

## 2023-03-25 DIAGNOSIS — G70.00 MYASTHENIA GRAVIS (H): ICD-10-CM

## 2023-03-27 RX ORDER — PYRIDOSTIGMINE BROMIDE 60 MG/1
TABLET ORAL
Qty: 270 TABLET | Refills: 0 | Status: SHIPPED | OUTPATIENT
Start: 2023-03-27 | End: 2023-06-30

## 2023-04-21 DIAGNOSIS — G62.0 DRUG-INDUCED POLYNEUROPATHY (H): ICD-10-CM

## 2023-04-21 RX ORDER — GABAPENTIN 300 MG/1
CAPSULE ORAL
Qty: 180 CAPSULE | Refills: 0 | Status: SHIPPED | OUTPATIENT
Start: 2023-04-21 | End: 2023-05-22

## 2023-04-27 ENCOUNTER — OFFICE VISIT (OUTPATIENT)
Dept: NEUROLOGY | Facility: CLINIC | Age: 58
End: 2023-04-27
Payer: COMMERCIAL

## 2023-04-27 VITALS
WEIGHT: 216.9 LBS | SYSTOLIC BLOOD PRESSURE: 143 MMHG | HEART RATE: 73 BPM | DIASTOLIC BLOOD PRESSURE: 80 MMHG | OXYGEN SATURATION: 96 % | RESPIRATION RATE: 16 BRPM | BODY MASS INDEX: 32.5 KG/M2

## 2023-04-27 DIAGNOSIS — G70.00 MYASTHENIA GRAVIS WITHOUT EXACERBATION (H): Primary | ICD-10-CM

## 2023-04-27 PROCEDURE — 99214 OFFICE O/P EST MOD 30 MIN: CPT | Performed by: PSYCHIATRY & NEUROLOGY

## 2023-04-27 ASSESSMENT — PAIN SCALES - GENERAL: PAINLEVEL: NO PAIN (0)

## 2023-04-27 NOTE — LETTER
4/27/2023       RE: Cj Morales  [address] [place] [address]       Dear Colleague,    Thank you for referring your patient, Cj Morales, to the [facility] NEUROLOGY CLINIC [place] at [facility]. Please see a copy of my visit note below.    Israel Quiroz MD (PCP)    [place], April 27, 2023    Dear Dr. 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  In the last couple of years, in addition to occasional ptosis from the myasthenia, he is experiencing voice changes with hoarseness, nasal speech quality and dysphagia.  When I last saw him, I asked him to restart prednisone at 10 mg every other day, then 20 mg every other day, which is what he is taking right now.  He is also taking pyridostigmine 60 mg twice daily.  Despite restarting the prednisone, his voice and swallow issues have not changed.  He has choking episodes occurring only a couple of times a week, but he has thick phlegm accumulating.  His speech is generally intelligible, but his friends will tell him that sometimes he will slur.  He does not acknowledge diurnal variation of those symptoms; they can be equally bad first thing in the morning or late in the evening.  Its not clear that taking pyridostigmine improves them.  He also reports ptosis that occurs on some days of every week, usually in the evening.  He has no diplopia, difficulty chewing, dyspnea on exertion, fatigue of his arms when brushing his teeth or combing his hair, or leg fatigue/weakness.    MG ADL score is 3, with one-point for ptosis, 1 for dysphagia, 1 for  speech/voice changes.  He reports eyelid twitching and fluttering that is sometimes bothersome.    He had a bone density DEXA scan 2 months ago which showed osteopenia. He is on calcium and vitamin D, as well as alendronate 35 mg weekly.  He is hemoglobin A1c levels have been between 5.9% and 6.2% in the last 4 months.  He has a prior history of diabetes type 2.    BP (!) 143/80   Pulse 73   Resp 16   Wt 98.4 kg (216 lb 14.4 oz)   SpO2 96%   BMI 32.50 kg/m      Exam: He has no aggravation of the ptosis after 1 minute of sustained upward gaze.  What mostly I observed in his eyelid exam is twitching, which could be a side effect of pyridostigmine.  There is no horizontal diplopia in any gaze position.  However after sustained upward gaze for 20 to 25 seconds, he developed some horizontal diplopia.  There is no weakness of orbicularis oculi orbicularis oris, jaw or tongue.  Tongue does not show atrophy or fasciculations.  Speech is still a little hoarse and hypernasal.  Neck flexion extension strength are full.  Strength is 5/5 in upper and lower extremities proximally.    In summary, Mr. Michelle has acetylcholine receptor antibody positive myasthenia and he still has mild ocular symptoms.  It is a bit unclear to me at this point if his hypernasal speech, hoarseness and dysphagia are complications of radiation therapy he received 3 years ago, versus bulbar myasthenia.  It is imperative to clarify this because the management is different.  If this is related to bulbar MG, he will need higher dose of prednisone, with consideration of additional oral immunosuppression.  I will request a single-fiber EMG to assess the status of his neuromuscular transmission; he must stop the pyridostigmine 24 hours prior to the test, and this was provided to him in written, and he will be reminded by our EMG techs before the appointment.  I will review the results and formulate the appropriate plan.  He agrees with these  instructions.    Follow-up in 4 months, sooner if needed.  Billing MDM level 4 (moderate) based on: #1 problems assessed: One chronic disorder with progression, exacerbation, or side effects of treatment (myasthenia gravis, not clear if it is fully controlled yet), and #2 risk: Prescription drug management, see above.          Again, thank you for allowing me to participate in the care of your patient.      Sincerely,    Eliud Page MD

## 2023-04-27 NOTE — PATIENT INSTRUCTIONS
At this point, it is very important to understand if your problems with your voice and swallowing are related to myasthenia, or radiation therapy of the head and neck.  I am frankly not sure.    To clarify this, we will have to get a test called single-fiber EMG.  We will schedule it today and it will be done with my colleague Dr. Johnson.  You must stop the pyridostigmine 24 hours prior to the test, or the test will canceled and will have to be rescheduled.    Follow-up in 4 months.  No changes in your medications right now.

## 2023-04-27 NOTE — PROGRESS NOTES
Israel Quiroz MD (PCP)    [place], April 27, 2023    Dear Dr. 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  In the last couple of years, in addition to occasional ptosis from the myasthenia, he is experiencing voice changes with hoarseness, nasal speech quality and dysphagia.  When I last saw him, I asked him to restart prednisone at 10 mg every other day, then 20 mg every other day, which is what he is taking right now.  He is also taking pyridostigmine 60 mg twice daily.  Despite restarting the prednisone, his voice and swallow issues have not changed.  He has choking episodes occurring only a couple of times a week, but he has thick phlegm accumulating.  His speech is generally intelligible, but his friends will tell him that sometimes he will slur.  He does not acknowledge diurnal variation of those symptoms; they can be equally bad first thing in the morning or late in the evening.  Its not clear that taking pyridostigmine improves them.  He also reports ptosis that occurs on some days of every week, usually in the evening.  He has no diplopia, difficulty chewing, dyspnea on exertion, fatigue of his arms when brushing his teeth or combing his hair, or leg fatigue/weakness.    MG ADL score is 3, with one-point for ptosis, 1 for dysphagia, 1 for speech/voice changes.  He reports eyelid twitching and fluttering that is sometimes bothersome.    He had a bone density DEXA scan 2 months ago which showed osteopenia. He is on calcium and vitamin D, as well as alendronate 35 mg weekly.  He is hemoglobin A1c levels have been between 5.9% and 6.2% in the last 4 months.  He has a prior  history of diabetes type 2.    BP (!) 143/80   Pulse 73   Resp 16   Wt 98.4 kg (216 lb 14.4 oz)   SpO2 96%   BMI 32.50 kg/m      Exam: He has no aggravation of the ptosis after 1 minute of sustained upward gaze.  What mostly I observed in his eyelid exam is twitching, which could be a side effect of pyridostigmine.  There is no horizontal diplopia in any gaze position.  However after sustained upward gaze for 20 to 25 seconds, he developed some horizontal diplopia.  There is no weakness of orbicularis oculi orbicularis oris, jaw or tongue.  Tongue does not show atrophy or fasciculations.  Speech is still a little hoarse and hypernasal.  Neck flexion extension strength are full.  Strength is 5/5 in upper and lower extremities proximally.    In summary, Mr. Michelle has acetylcholine receptor antibody positive myasthenia and he still has mild ocular symptoms.  It is a bit unclear to me at this point if his hypernasal speech, hoarseness and dysphagia are complications of radiation therapy he received 3 years ago, versus bulbar myasthenia.  It is imperative to clarify this because the management is different.  If this is related to bulbar MG, he will need higher dose of prednisone, with consideration of additional oral immunosuppression.  I will request a single-fiber EMG to assess the status of his neuromuscular transmission; he must stop the pyridostigmine 24 hours prior to the test, and this was provided to him in written, and he will be reminded by our EMG techs before the appointment.  I will review the results and formulate the appropriate plan.  He agrees with these instructions.    Follow-up in 4 months, sooner if needed.  Billing MDM level 4 (moderate) based on: #1 problems assessed: One chronic disorder with progression, exacerbation, or side effects of treatment (myasthenia gravis, not clear if it is fully controlled yet), and #2 risk: Prescription drug management, see above.    Sincerely,    Eliud  MD Kaylee, FAAN

## 2023-05-22 DIAGNOSIS — G62.0 DRUG-INDUCED POLYNEUROPATHY (H): ICD-10-CM

## 2023-05-23 RX ORDER — GABAPENTIN 300 MG/1
CAPSULE ORAL
Qty: 180 CAPSULE | Refills: 1 | Status: SHIPPED | OUTPATIENT
Start: 2023-05-23 | End: 2023-11-13

## 2023-06-20 DIAGNOSIS — G70.00 MYASTHENIA GRAVIS WITHOUT EXACERBATION (H): ICD-10-CM

## 2023-06-20 RX ORDER — PREDNISONE 10 MG/1
TABLET ORAL
Qty: 90 TABLET | Refills: 1 | Status: SHIPPED | OUTPATIENT
Start: 2023-06-20 | End: 2023-10-23 | Stop reason: DRUGHIGH

## 2023-06-30 DIAGNOSIS — G70.00 MYASTHENIA GRAVIS (H): ICD-10-CM

## 2023-06-30 RX ORDER — PYRIDOSTIGMINE BROMIDE 60 MG/1
TABLET ORAL
Qty: 270 TABLET | Refills: 0 | Status: SHIPPED | OUTPATIENT
Start: 2023-06-30 | End: 2023-11-01

## 2023-07-12 ENCOUNTER — TELEPHONE (OUTPATIENT)
Dept: NEUROLOGY | Facility: CLINIC | Age: 58
End: 2023-07-12
Payer: COMMERCIAL

## 2023-07-12 NOTE — TELEPHONE ENCOUNTER
LVM: First EMG appointment (8/24) was an error and offered a correct Single Fiber appointment with Dr. Johnson on 8/28 @ 11:45AM. Waiting for his confirmation.

## 2023-08-08 DIAGNOSIS — E11.9 TYPE 2 DIABETES MELLITUS WITHOUT COMPLICATION, WITHOUT LONG-TERM CURRENT USE OF INSULIN (H): ICD-10-CM

## 2023-08-08 NOTE — TELEPHONE ENCOUNTER
"Routing refill request to provider for review/approval because:  Labs not current:  1/24/2023 for A1c  Patient needs to be seen because:  over 6 months     Last Written Prescription Date:  2/5/2023  Last Fill Quantity: 360,  # refills: 1   Last office visit provider:  1/24/2023     Requested Prescriptions   Pending Prescriptions Disp Refills    metFORMIN (GLUCOPHAGE) 500 MG tablet [Pharmacy Med Name: METFORMIN 500MG TABLETS] 360 tablet 1     Sig: TAKE 2 TABLETS(1000 MG) BY MOUTH TWICE DAILY WITH MEALS       Biguanide Agents Failed - 8/8/2023  9:08 AM        Failed - Patient has documented A1c within the specified period of time.     If HgbA1C is 8 or greater, it needs to be on file within the past 3 months.  If less than 8, must be on file within the past 6 months.     Recent Labs   Lab Test 01/24/23  1135   A1C 6.2*             Failed - Recent (6 mo) or future (30 days) visit within the authorizing provider's specialty     Patient had office visit in the last 6 months or has a visit in the next 30 days with authorizing provider or within the authorizing provider's specialty.  See \"Patient Info\" tab in inbasket, or \"Choose Columns\" in Meds & Orders section of the refill encounter.            Passed - Patient is age 10 or older        Passed - Patient's CR is NOT>1.4 OR Patient's EGFR is NOT<45 within past 12 mos.     Recent Labs   Lab Test 01/24/23  1135 05/04/22  1044 04/15/21  0810   GFRESTIMATED 85   < > >60   GFRESTBLACK  --   --  >60    < > = values in this interval not displayed.       Recent Labs   Lab Test 01/24/23  1135   CR 1.02             Passed - Patient does NOT have a diagnosis of CHF.        Passed - Medication is active on med list             Regine Guerrier RN 08/08/23 9:09 AM  "

## 2023-08-09 ENCOUNTER — TRANSFERRED RECORDS (OUTPATIENT)
Dept: HEALTH INFORMATION MANAGEMENT | Facility: CLINIC | Age: 58
End: 2023-08-09
Payer: COMMERCIAL

## 2023-08-11 ENCOUNTER — TRANSFERRED RECORDS (OUTPATIENT)
Dept: HEALTH INFORMATION MANAGEMENT | Facility: CLINIC | Age: 58
End: 2023-08-11
Payer: COMMERCIAL

## 2023-08-27 ENCOUNTER — HEALTH MAINTENANCE LETTER (OUTPATIENT)
Age: 58
End: 2023-08-27

## 2023-09-08 ENCOUNTER — TRANSFERRED RECORDS (OUTPATIENT)
Dept: HEALTH INFORMATION MANAGEMENT | Facility: CLINIC | Age: 58
End: 2023-09-08
Payer: COMMERCIAL

## 2023-09-08 DIAGNOSIS — M81.8 STEROID-INDUCED OSTEOPOROSIS: ICD-10-CM

## 2023-09-08 DIAGNOSIS — T38.0X5A STEROID-INDUCED OSTEOPOROSIS: ICD-10-CM

## 2023-09-08 RX ORDER — ALENDRONATE SODIUM 35 MG/1
35 TABLET ORAL
Qty: 12 TABLET | Refills: 1 | Status: SHIPPED | OUTPATIENT
Start: 2023-09-08 | End: 2024-03-01

## 2023-09-21 ENCOUNTER — TELEPHONE (OUTPATIENT)
Dept: FAMILY MEDICINE | Facility: CLINIC | Age: 58
End: 2023-09-21
Payer: COMMERCIAL

## 2023-09-21 DIAGNOSIS — Z12.11 COLON CANCER SCREENING: Primary | ICD-10-CM

## 2023-09-21 NOTE — TELEPHONE ENCOUNTER
Patient Quality Outreach    Patient is due for the following:   Diabetes -  A1C and Foot Exam  Colon Cancer Screening      Topic Date Due    Hepatitis B Vaccine (1 of 3 - 3-dose series) Never done    Flu Vaccine (1) 09/01/2023       Next Steps:   Schedule a office visit for diabetes follow up    Type of outreach:    Sent [employer] message. And reminder to complete colonoscopy      Questions for provider review:    None           Nina Chaudhry MA

## 2023-10-23 ENCOUNTER — OFFICE VISIT (OUTPATIENT)
Dept: NEUROLOGY | Facility: CLINIC | Age: 58
End: 2023-10-23
Payer: COMMERCIAL

## 2023-10-23 VITALS
BODY MASS INDEX: 31.76 KG/M2 | DIASTOLIC BLOOD PRESSURE: 75 MMHG | SYSTOLIC BLOOD PRESSURE: 117 MMHG | WEIGHT: 212 LBS | HEART RATE: 79 BPM | OXYGEN SATURATION: 98 %

## 2023-10-23 DIAGNOSIS — Z79.52 CURRENT CHRONIC USE OF SYSTEMIC STEROIDS: Primary | ICD-10-CM

## 2023-10-23 DIAGNOSIS — G70.00 MYASTHENIA GRAVIS WITHOUT EXACERBATION (H): ICD-10-CM

## 2023-10-23 PROCEDURE — 99214 OFFICE O/P EST MOD 30 MIN: CPT | Performed by: PSYCHIATRY & NEUROLOGY

## 2023-10-23 RX ORDER — PREDNISONE 5 MG/1
TABLET ORAL
Qty: 75 TABLET | Refills: 1 | Status: SHIPPED | OUTPATIENT
Start: 2023-10-23

## 2023-10-23 RX ORDER — IBUPROFEN 200 MG
200 TABLET ORAL EVERY 6 HOURS PRN
COMMUNITY

## 2023-10-23 ASSESSMENT — PAIN SCALES - GENERAL: PAINLEVEL: NO PAIN (0)

## 2023-10-23 NOTE — PATIENT INSTRUCTIONS
Reduce your prednisone dose as follows:    15 mg (three tablets of 5 mg) every other day for 1 month, then  10 mg (two tablets of 5 mg) every other day until you return to Clinic    Please contact me if any of your myasthenia symptoms (droopy eye, double vision, swallowing problems, etc) worsen after you reduce the dose    Follow up 4 months    I have ordered lab tests, you can do it next week when you see Dr Quiroz if you don't want to stop by the lab today

## 2023-10-23 NOTE — NURSING NOTE
Chief Complaint   Patient presents with    RECHECK    Myasthenia Gravis       Vitals were taken and medications were reconciled.    Abilio Christopher, Technician  4:02 PM  October 23, 2023

## 2023-10-23 NOTE — LETTER
10/23/2023       RE: Cj Morales  [address] [place] [address]     Dear Colleague,    Thank you for referring your patient, Cj Morales, to the [facility] NEUROLOGY CLINIC [place] at [facility]. Please see a copy of my visit note below.    Israel Quiroz MD  [place], October 23, 2023    Dear Dr. Quiroz,    I had the pleasure to see Mr. Morales in follow-up at the [facility]/neuromuscular clinic for his acetylcholine receptor antibody positive, predominantly ocular myasthenia, without thymoma.  He also has a history of head and neck cancer status postradiation therapy that was completed in 2020.  In the last visit, he was complaining of dysphonia and dysphagia, and I was not sure to what extent those were MG related versus complications of radiation therapy.  For that reason, I asked for a single-fiber EMG to assess the status of his neuromuscular junction, but he never did it.  He stopped pyridostigmine a few months ago, and he actually feels better off it.  He continues on prednisone 20 mg every other day.  He reports no bothersome ptosis, diplopia, dysphagia, dysarthria, sialorrhea, difficulty chewing, shortness of breath on exertion, fatigue of his arms when brushing teeth or combing hair, or leg fatigue/weakness.      His MG ADL score at this point is 0, and better than prior visit.    /75 (BP Location: Right arm, Patient Position: Right side, Cuff Size: Adult Regular)   Pulse 79   Wt 96.2 kg (212 lb)   SpO2 98%   BMI 31.76 kg/m      EXAM: He does have mild eyelid ptosis bilaterally, more so on the right, after sustained upward gaze for 20 seconds.  I do not see any weakness of orbicularis oculi, or orbicularis oris today.  There is no diplopia in any cardinal gaze position, and eye ductions and versions are normal.  He has no weakness of neck flexion, neck extension, tongue, or jaw.  His speech  is a bit nasal, indicating mild velopalatal insufficiency.  His strength is 5/5 in proximal upper and lower extremities.    In summary, Mr. Morales's myasthenia appears to be doing very well now, and he is almost symptom-free.  He feels even better off pyridostigmine.  At this point, I do not think that repetitive nerve stimulation or single-fiber EMG are strongly needed, and we will defer them.  I would recommend a very slow taper of his prednisone dose as follows: 15 mg every other day for 1 month, then 10 mg every other day, until follow-up in clinic.  He should notify me if any of his previous symptoms, including ptosis, dysphagia, dysphonia, etc. get worse during the steroid taper.  He understands this recommendation.    He had a DEXA bone density scan in February 2023 showing osteopenia and he is on alendronate 35 mg weekly for it. Today we will recheck hemoglobin A1c and basic metabolic panel, for surveillance reasons (chronic steroid use)    Follow-up in clinic in 4 months, sooner if needed.    Billing MDM level 4 (moderate) based on 1) Amount/Complexity: Ordering 2 unique tests today (hemoglobin A1c, BMP), and reviewing the results of 1 unique test (DEXA scan), and 2) Risk: Prescription drug management.          Again, thank you for allowing me to participate in the care of your patient.      Sincerely,    Eliud Page MD

## 2023-11-01 ENCOUNTER — OFFICE VISIT (OUTPATIENT)
Dept: FAMILY MEDICINE | Facility: CLINIC | Age: 58
End: 2023-11-01
Payer: COMMERCIAL

## 2023-11-01 VITALS
BODY MASS INDEX: 32.96 KG/M2 | HEART RATE: 79 BPM | RESPIRATION RATE: 15 BRPM | WEIGHT: 217.5 LBS | DIASTOLIC BLOOD PRESSURE: 58 MMHG | SYSTOLIC BLOOD PRESSURE: 116 MMHG | HEIGHT: 68 IN | TEMPERATURE: 98.9 F | OXYGEN SATURATION: 97 %

## 2023-11-01 DIAGNOSIS — N52.9 ERECTILE DYSFUNCTION, UNSPECIFIED ERECTILE DYSFUNCTION TYPE: ICD-10-CM

## 2023-11-01 DIAGNOSIS — G62.9 NEUROPATHY: ICD-10-CM

## 2023-11-01 DIAGNOSIS — G70.00 MYASTHENIA GRAVIS (H): ICD-10-CM

## 2023-11-01 DIAGNOSIS — R25.2 MUSCLE CRAMPS: ICD-10-CM

## 2023-11-01 DIAGNOSIS — Z00.00 HEALTHCARE MAINTENANCE: ICD-10-CM

## 2023-11-01 DIAGNOSIS — E03.9 HYPOTHYROIDISM, UNSPECIFIED TYPE: Primary | ICD-10-CM

## 2023-11-01 DIAGNOSIS — C14.0 MALIGNANT NEOPLASM OF PHARYNX (H): ICD-10-CM

## 2023-11-01 DIAGNOSIS — E11.9 TYPE 2 DIABETES MELLITUS WITHOUT COMPLICATION, WITHOUT LONG-TERM CURRENT USE OF INSULIN (H): ICD-10-CM

## 2023-11-01 LAB
ANION GAP SERPL CALCULATED.3IONS-SCNC: 10 MMOL/L (ref 7–15)
BUN SERPL-MCNC: 22 MG/DL (ref 8–23)
CALCIUM SERPL-MCNC: 9.8 MG/DL (ref 8.6–10)
CHLORIDE SERPL-SCNC: 104 MMOL/L (ref 98–107)
CK SERPL-CCNC: 54 U/L (ref 39–308)
CREAT SERPL-MCNC: 0.95 MG/DL (ref 0.67–1.17)
DEPRECATED HCO3 PLAS-SCNC: 27 MMOL/L (ref 22–29)
EGFRCR SERPLBLD CKD-EPI 2021: >90 ML/MIN/1.73M2
GLUCOSE SERPL-MCNC: 135 MG/DL (ref 70–99)
HBA1C MFR BLD: 6 % (ref 0–5.6)
MAGNESIUM SERPL-MCNC: 1.9 MG/DL (ref 1.7–2.3)
POTASSIUM SERPL-SCNC: 4.9 MMOL/L (ref 3.4–5.3)
SODIUM SERPL-SCNC: 141 MMOL/L (ref 135–145)
TSH SERPL DL<=0.005 MIU/L-ACNC: 2.59 UIU/ML (ref 0.3–4.2)

## 2023-11-01 PROCEDURE — 90480 ADMN SARSCOV2 VAC 1/ONLY CMP: CPT | Performed by: FAMILY MEDICINE

## 2023-11-01 PROCEDURE — 90471 IMMUNIZATION ADMIN: CPT | Performed by: FAMILY MEDICINE

## 2023-11-01 PROCEDURE — 82550 ASSAY OF CK (CPK): CPT | Performed by: FAMILY MEDICINE

## 2023-11-01 PROCEDURE — 91320 SARSCV2 VAC 30MCG TRS-SUC IM: CPT | Performed by: FAMILY MEDICINE

## 2023-11-01 PROCEDURE — 36415 COLL VENOUS BLD VENIPUNCTURE: CPT | Performed by: FAMILY MEDICINE

## 2023-11-01 PROCEDURE — 83735 ASSAY OF MAGNESIUM: CPT | Performed by: FAMILY MEDICINE

## 2023-11-01 PROCEDURE — 90686 IIV4 VACC NO PRSV 0.5 ML IM: CPT | Performed by: FAMILY MEDICINE

## 2023-11-01 PROCEDURE — 99207 PR FOOT EXAM NO CHARGE: CPT | Performed by: FAMILY MEDICINE

## 2023-11-01 PROCEDURE — 80048 BASIC METABOLIC PNL TOTAL CA: CPT | Performed by: FAMILY MEDICINE

## 2023-11-01 PROCEDURE — 83036 HEMOGLOBIN GLYCOSYLATED A1C: CPT | Performed by: FAMILY MEDICINE

## 2023-11-01 PROCEDURE — 99214 OFFICE O/P EST MOD 30 MIN: CPT | Mod: 25 | Performed by: FAMILY MEDICINE

## 2023-11-01 PROCEDURE — 84443 ASSAY THYROID STIM HORMONE: CPT | Performed by: FAMILY MEDICINE

## 2023-11-01 RX ORDER — SILDENAFIL 100 MG/1
100 TABLET, FILM COATED ORAL DAILY PRN
Qty: 10 TABLET | Refills: 11 | Status: SHIPPED | OUTPATIENT
Start: 2023-11-01

## 2023-11-01 RX ORDER — MAGNESIUM OXIDE 400 MG/1
400 TABLET ORAL
Qty: 90 TABLET | Refills: 3 | Status: SHIPPED | OUTPATIENT
Start: 2023-11-01

## 2023-11-01 NOTE — ASSESSMENT & PLAN NOTE
Metformin alone, A1c 6.0.  Wondering if he should go back on glipizide to make this lower still, discussed that I do not think so, certainly even at aggressive goal currently.  Atorvastatin, blood pressure good.  No change

## 2023-11-01 NOTE — PATIENT INSTRUCTIONS
.    This is an excerpt from the American Family Physician Medical Journal where a doctor, who thinks we should be doing the psa writes to argue with someone who wrote an editorial saying we should not (2017):     One would have to screen approximately 800 men (with PSA test)to prevent one from dying of prostate cancer.  I do not believe that this supports the authors' conclusion that  prostate cancer screening provides a very small benefit, which is outweighed by significant potential harms of screening and associated follow-up treatment.   This conclusion will no doubt impact the decision making of many practicing physicians, but there is a danger of impersonalization inherent in reaching conclusions based solely on statistical data.  For another perspective, imagine sitting in a crowded football stadium with 100,000 men in the stands who have all been screened for prostate cancer. At halftime, all men whose lives were saved in the past 12 months because of prostate cancer screening are invited to come onto the field. According to the statistics derived from The Lancet study, there are now 125 men (100,000/800) enjoying a football game who would have been dead without screening. How many of the other men in the crowd would conclude that screening is only of little benefit and be inclined to forgo future screening for themselves? I hope family physicians reject practice-changing conclusions based only on  valid data  that ignore the value of individual lives lost because of nifty statistical analysis.  PAYAL ZENDEJAS MD  .  Reply : Dr. Zendejas focuses entirely on the potential benefits of prostate cancer screening at the population level and disregards the harms. This is inappropriate, because when we are beginning with a population of asymptomatic men, it is particularly important that the process of screening, biopsy, and treatment does not do more harm than good. Unfortunately, the potential harms of screening for  prostate cancer are well established. Based on data those 125 men would be joined on the field by more than 10,000 men who undergo treatment for prostate cancer, of whom about one-third never would have experienced symptoms or illness from the disease. Of those men, approximately 1,700 will experience urinary incontinence, 2,800 will experience long-term erectile dysfunction, and 40 to 50 will die from complications of prostate cancer treatment. These harms may be partially mitigated if more men opt for active surveillance or watchful waiting, although it is unclear to what extent.  Finally, we are not aware of any organization that recommends population screening for prostate cancer in all men in a certain age range. The American Urological Association2 and the American College of Physicians3 recommend that physicians discuss the potential benefits and harms of screening in men 55 to 69 years of age, but they do not recommend screening for all men in that age range. The [employer] Task Force1 and the U.S. Preventive Services Task Force4 recommend against screening for prostate cancer.    -The Author of the Original Article

## 2023-11-01 NOTE — ASSESSMENT & PLAN NOTE
Daljit Bazzi, needs follow-up with radiation oncology.  Having some issues with chronic upper pharyngeal congestion, needing to clear her throat-no sign of recurrence of tumor.

## 2023-11-01 NOTE — ASSESSMENT & PLAN NOTE
Following with neurology, seems to be in remission.  Stopped pyridostigmine and seemed better.  Neurologist did not understand this by his account but allowed him to stay off of it since he seems better.  Prednisone at 15 mg every other day, tapering slowly.

## 2023-11-01 NOTE — ASSESSMENT & PLAN NOTE
Significant discomfort good sensation on monofilament testing, 600 3 times daily gabapentin.  No change for now.

## 2023-11-01 NOTE — ASSESSMENT & PLAN NOTE
COVID, flu  Discussed PSA, declined  Encouraged to make colonoscopy appointment, has not done that yet

## 2023-11-01 NOTE — ASSESSMENT & PLAN NOTE
For now continue atorvastatin, check CK, check magnesium and BMP, trial of magnesium oxide before bed

## 2023-11-01 NOTE — PROGRESS NOTES
Assessment/ Plan  Healthcare maintenance  COVID, flu  Discussed PSA, declined  Encouraged to make colonoscopy appointment, has not done that yet    Malignant neoplasm of pharynx (H)  Daljit Bazzi, needs follow-up with radiation oncology.  Having some issues with chronic upper pharyngeal congestion, needing to clear her throat-no sign of recurrence of tumor.    Myasthenia gravis (H)  Following with neurology, seems to be in remission.  Stopped pyridostigmine and seemed better.  Neurologist did not understand this by his account but allowed him to stay off of it since he seems better.  Prednisone at 15 mg every other day, tapering slowly.    Type 2 diabetes mellitus (H)  Metformin alone, A1c 6.0.  Wondering if he should go back on glipizide to make this lower still, discussed that I do not think so, certainly even at aggressive goal currently.  Atorvastatin, blood pressure good.  No change    Hypothyroidism, unspecified type  Check TSH today, this is iatrogenic hypothyroidism.  On 100 mcg    Neuropathy  Significant discomfort good sensation on monofilament testing, 600 3 times daily gabapentin.  No change for now.    Muscle cramps  For now continue atorvastatin, check CK, check magnesium and BMP, trial of magnesium oxide before bed   Subjective  CC:  chief complaint  HPI:      58-year-old, history of myasthenia gravis, malignant pharyngeal neoplasm, type 2 diabetes, hypothyroidism, presents for follow-up on diabetes.  Currently takes atorvastatin, gabapentin, levothyroxine, metformin 1000 twice daily.  Also on alendronate.  Last visit with me was 1/24/2023.  A1c was 5.9 at that point..  Discussed PSA wanted to hold off, encouraged him to schedule colonoscopy.  Due for COVID booster, flu, consider hepatitis B  PFSH:  Patient Active Problem List   Diagnosis    Dehydration    Dysphagia    Essential tremor    Healthcare maintenance    Hyperlipidemia    Lymphadenopathy    Malignant neoplasm of pharynx (H)    Myasthenia  gravis (H)    Obesity    Physiological tremor    Rash and nonspecific skin eruption    Type 2 diabetes mellitus (H)    Drug-induced polyneuropathy (H24)    Hypothyroidism, unspecified type    Neuropathy    Muscle cramps     alendronate (FOSAMAX) 35 MG tablet, Take 1 tablet (35 mg) by mouth every 7 days  atorvastatin (LIPITOR) 10 MG tablet, TAKE 1 TABLET(10 MG) BY MOUTH AT BEDTIME  gabapentin (NEURONTIN) 300 MG capsule, 2 capsules, 3x daily  ibuprofen (ADVIL/MOTRIN) 200 MG tablet, Take 200 mg by mouth every 6 hours as needed  levothyroxine (SYNTHROID/LEVOTHROID) 100 MCG tablet, Take 1 tablet (100 mcg) by mouth daily daily  metFORMIN (GLUCOPHAGE) 500 MG tablet, TAKE 2 TABLETS(1000 MG) BY MOUTH TWICE DAILY WITH MEALS  omeprazole (PRILOSEC) 20 MG DR capsule, Take 20 mg by mouth daily  predniSONE (DELTASONE) 5 MG tablet, 3 tablets every other day for 1 month, then 2 tablets every other day (Patient taking differently: 3 tablets every other day for a couple of more months then 2 tablets every other day- tapering off)  pyridostigmine (MESTINON) 60 MG tablet, TAKE 1 TABLET BY MOUTH THREE TIMES A DAY (Patient not taking: Reported on 11/1/2023)    No current facility-administered medications on file prior to visit.       History   Smoking Status    Never   Smokeless Tobacco    Never     Social History     Social History Narrative    Not on file     Patient Care Team:  Israel Quiroz MD as PCP - General (Family Medicine)  Mimi Padron, PharmD as Pharmacist (Pharmacist)  Vinod Stephens MD as MD (Neurology)  Israel Quiroz MD as Assigned PCP  Eliud Page MD as MD (Neurology)  Eliud Page MD as Assigned Neuroscience Provider        Objective  Physical Exam  Vitals:    11/01/23 0825   BP: 116/58   BP Location: Right arm   Patient Position: Sitting   Cuff Size: Adult Regular   Pulse: 79   Resp: 15   Temp: 98.9  F (37.2  C)   TempSrc: Oral   SpO2: 97%   Weight: 98.7 kg (217 lb 8 oz)  "  Height: 1.727 m (5' 8\")     Body mass index is 33.07 kg/m .  Gen- alert, oriented  No acute distress  HEENT- normal cephalic, atraumatic.   Chest- Normal inspiration and expiration.    Clear to ascultation.    No chest wall deformity or scar.    CV- Heart regular rate and rhythm  normal tones, no murmurs   No gallops or rubs.  abdomen is not tender on palpation  Ext-no cyanosis   No edema  Skin- warm and dry,   no visualized rash    Diagnostics:   Results for orders placed or performed in visit on 11/01/23   Basic metabolic panel     Status: Abnormal   Result Value Ref Range    Sodium 141 135 - 145 mmol/L    Potassium 4.9 3.4 - 5.3 mmol/L    Chloride 104 98 - 107 mmol/L    Carbon Dioxide (CO2) 27 22 - 29 mmol/L    Anion Gap 10 7 - 15 mmol/L    Urea Nitrogen 22.0 8.0 - 23.0 mg/dL    Creatinine 0.95 0.67 - 1.17 mg/dL    GFR Estimate >90 >60 mL/min/1.73m2    Calcium 9.8 8.6 - 10.0 mg/dL    Glucose 135 (H) 70 - 99 mg/dL   Hemoglobin A1c     Status: Abnormal   Result Value Ref Range    Hemoglobin A1C 6.0 (H) 0.0 - 5.6 %   TSH     Status: Normal   Result Value Ref Range    TSH 2.59 0.30 - 4.20 uIU/mL   Magnesium     Status: Normal   Result Value Ref Range    Magnesium 1.9 1.7 - 2.3 mg/dL   CK total     Status: Normal   Result Value Ref Range    CK 54 39 - 308 U/L           Please note: Voice recognition software was used in this dictation.  It may therefore contain typographical errors.        Wt Readings from Last 3 Encounters:   11/01/23 98.7 kg (217 lb 8 oz)   10/23/23 96.2 kg (212 lb)   04/27/23 98.4 kg (216 lb 14.4 oz)        Screening Questionnaire for Adult Immunization    Are you sick today?   No   Do you have allergies to medications, food, a vaccine component or latex?   Yes   Have you ever had a serious reaction after receiving a vaccination?   No   Do you have a long-term health problem with heart, lung, kidney, or metabolic disease (e.g., diabetes), asthma, a blood disorder, no spleen, complement component " deficiency, a cochlear implant, or a spinal fluid leak?  Are you on long-term aspirin therapy?   Yes   Do you have cancer, leukemia, HIV/AIDS, or any other immune system problem?   No   Do you have a parent, brother, or sister with an immune system problem?   Yes   In the past 3 months, have you taken medications that affect  your immune system, such as prednisone, other steroids, or anticancer drugs; drugs for the treatment of rheumatoid arthritis, Crohn s disease, or psoriasis; or have you had radiation treatments?   Yes   Have you had a seizure, or a brain or other nervous system problem?   No   During the past year, have you received a transfusion of blood or blood    products, or been given immune (gamma) globulin or antiviral drug?   No   For women: Are you pregnant or is there a chance you could become       pregnant during the next month?   No   Have you received any vaccinations in the past 4 weeks?   No     Immunization questionnaire was positive for at least one answer.  Notified Dr. Quiroz.  A total of 50 minutes was spent on this visit reviewing previous notes, counseling patient, ordering tests , adjusting meds (see below) and documenting the findings in this note      Screening performed by Nina Chaudhry MA on 11/1/2023 at 8:34 AM.        Answers submitted by the patient for this visit:  Diabetes Visit (Submitted on 11/1/2023)  Chief Complaint: Chronic problems general questions HPI Form  Frequency of checking blood sugars:: a few times a month  What time of day are you checking your blood sugars : before meals  Have you had any blood sugars above 200?: No  Have you had any blood sugars below 70?: No  Hypoglycemia symptoms:: none  Diabetic concerns:: none  Paraesthesia present:: numbness in feet, burning in feet, weight gain  General Questionnaire (Submitted on 11/1/2023)  Chief Complaint: Chronic problems general questions HPI Form  How many servings of fruits and vegetables do you eat daily?:  0-1  On average, how many sweetened beverages do you drink each day (Examples: soda, juice, sweet tea, etc.  Do NOT count diet or artificially sweetened beverages)?: 0  How many minutes a day do you exercise enough to make your heart beat faster?: 30 to 60  How many days per week do you miss taking your medication?: 0

## 2023-11-13 DIAGNOSIS — G62.0 DRUG-INDUCED POLYNEUROPATHY (H): ICD-10-CM

## 2023-11-13 RX ORDER — GABAPENTIN 300 MG/1
CAPSULE ORAL
Qty: 180 CAPSULE | Refills: 1 | Status: SHIPPED | OUTPATIENT
Start: 2023-11-13 | End: 2024-01-19

## 2023-12-04 ENCOUNTER — OFFICE VISIT (OUTPATIENT)
Dept: NEUROLOGY | Facility: CLINIC | Age: 58
End: 2023-12-04
Attending: PSYCHIATRY & NEUROLOGY
Payer: COMMERCIAL

## 2023-12-04 DIAGNOSIS — G70.00 MYASTHENIA GRAVIS WITHOUT EXACERBATION (H): ICD-10-CM

## 2023-12-04 PROCEDURE — 95872 NDL EMG SINGLE FIBER ELTRD: CPT | Performed by: PSYCHIATRY & NEUROLOGY

## 2023-12-04 PROCEDURE — 95937 NEUROMUSCULAR JUNCTION TEST: CPT | Performed by: PSYCHIATRY & NEUROLOGY

## 2023-12-04 NOTE — LETTER
2023       RE: jC Morales  [address] [place] [address]       Dear Colleague,    Thank you for referring your patient, Cj Morales, to the [facility] [place] at [facility]. Please see a copy of my visit note below.                            [facility]  Electrodiagnostic Laboratory                 Department of Neurology                                                                                                         Test Date:  2023    Patient: Albert Morales : 1965 Physician: Vinod Johnson MD   Sex: Male AGE: 58 year Ref Phys:    ID#: [id number]   Technician: Jerzy Márquez     History & Examination: This 58 year-old man presents with myasthenia gravis This study was requested to evaluate status of neuromuscular transmission.    Techniques:  Single fiber EMG was done with a disposable Ambu Neuroline 25g concentric needle electrode, and voluntary activation of motor units. Repetitive nerve stimulation was done with the surface electrodes.    Voluntary activation:  OO       Individual 45       Mean  31     Abnormal if 3 outliers - 20 pairs and 4 outliers - 30 pairs    Reference:  Jovany Montanez et al. Reference values for jitter recorded by concentric needle electrodes in healthy controls: A multicenter study. 2016. Muscle Nerve 53: 351-362    Results:  Low frequency repetitive nerve stimulation of the left ulnar and facial nerves showed no abnormal decrement at rest and after short exercise.  Single fiber EMG was of a good technical quality.  The orbicularis oculi muscle was studied with 20 individual pairs. All individual pairs demonstrated jitter ranging from 16  s to 34  s (<45  s).  The mean jitter of the 20 pairs was 24.7  s (nl< 31  s). There was no transmission blocking.    Interpretation:  There is no electrodiagnostic evidence for neuromuscular transmission  defect.    Comment: This study does not provide evidence to support diagnosis of myasthenia gravis.       Vinod Johnson MD  Neuromuscular medicine        Nerve Conduction Studies  Motor Sites      Latency Amplitude Neg. Amp Diff Segment Distance Velocity Neg. Dur Neg Area Diff Temperature Comment   Site (ms) Norm (mV) Norm (%)  cm m/s Norm (ms) (%) ( C)    Left Ulnar (ADM) Motor   Wrist 3.2  < 3.5 7.5  > 5.0  Wrist-ADM 8   5.9  31.3    Bel Elbow 7.1 - 6.9 - -8 Bel Elbow-Wrist 20 51  > 48 6.2 -2 31.3    Abv Elbow 8.5 - 6.9 - 0 Abv Elbow-Bel Elbow 8 57  > 48 6.3 0 31.3      RNS     Trial # Label Amp 1 (mV)  O-P Amp 4 (mV)  O-P Amp % Dif Area 1 (mV ms) Area 4 (mV ms) Area % Dif Rep Rate Train Length Pause Time (min:sec) Comments   Left Abductor Digiti Minimi   Tr 1 Baseline 8.34 8.59 3.0 31.24 29.36 -6.0 3.00 6 00:30    Tr 2 Post Exercise 8.87 9.38 5.8 34.69 31.28 -9.8 3.00 6 01:00    Tr 3 1 min Post 8.90 8.94 0.5 34.10 33.13 -2.8 3.00 6 01:00    Tr 4 2 min Post 8.66 8.89 2.6 34.30 32.83 -4.3 3.00 6 01:00    Tr 5 3 min Post 9.04 9.35 3.5 34.61 33.35 -3.6 3.00 6 00:00    Left Nasalis   Tr 1 Baseline 2.27 2.26 -0.4 7.29 7.19 -1.4 3.00 6 00:30    Tr 2 Post Exercise 3.00 2.93 -2.1 8.73 8.55 -2.1 3.00 6 01:00    Tr 3 1 min Post 2.67 2.66 -0.4 8.69 8.60 -1.0 3.00 6 01:00    Tr 4 2 min Post 2.68 2.65 -1.0 8.85 8.68 -1.9 3.00 6 01:00    Tr 5 3 min Post 2.63 2.64 0.4 8.73 8.52 -2.4 3.00 6 00:00          Left Orbicularis Oculi     Run  Pair  Samples  Blocks  IPI( s) Jitter( s) Jitter Norm( s) GEENA( s) MSD( s) Freq(Hz)   2 1 102 No 380 27.8 <55.3 27.8 29.4 15.1   3 1 82 No 479 23.1  23.1 30.8 19.6   4 1 74 No 323 13.7  13.7 18.8 24.3   6 1 74 No 396 32.2  35.9 32.2 24.4     2 65 No 706 22.4  23.9 22.4 24.4   7 1 53 No 375 21.0  21.0 23.0 22.5   8 1 60 No 267 23.3  23.3 25.7 16.1   9 1 193 No 247 36.2  36.2 36.9 9.3   10 1 54 No 424 16.3  16.8 16.3 16.2   11 1 47 No 301 29.9  29.9 31.4 22.3   12 1 38 No 431 24.0  24.0 33.5  19.0   13 1 94 No 344 17.6  17.6 27.9 18.3   14 1 76 No 424 18.2  18.2 20.8 22.5   15 1 102 No 440 22.0  22.0 37.2 7.3   16 1 95 No 431 25.0  25.0 97.7 15.0   17 1 37 No 474 24.6  24.6 32.1 22.3   18 1 75 No 436 33.2  33.2 41.3 20.4   21 1 54 No 601 34.2  34.2 34.2 20.8   22 1 45 No 632 19.2  19.2 20.3 34.7   23 1 75 No 915 29.3  31.9 29.3 31.5   Mean       451 24.7 <41.8 25.1 32.1 20.1   SD       159 6.3   6.6 16.8 6.5   No Pairs:20       Mean Jitter:24.7  s  Fiber Density:    Blocks:   0.0 %  Jitter:          0.0 %    Normal:          100.0 %           NCS Waveforms:    Motor                                                       Ultrasound Images:        Again, thank you for allowing me to participate in the care of your patient.      Sincerely,    Vinod Johnson MD

## 2023-12-04 NOTE — PROGRESS NOTES
[facility]  Electrodiagnostic Laboratory                 Department of Neurology                                                                                                         Test Date:  2023    Patient: Albert Sifuentes : 1965 Physician: Vinod Johnson MD   Sex: Male AGE: 58 year Ref Phys:    ID#: [id number]   Technician: Jerzy Márquez     History & Examination: This 58 year-old man presents with myasthenia gravis This study was requested to evaluate status of neuromuscular transmission.    Techniques:  Single fiber EMG was done with a disposable Ambu Neuroline 25g concentric needle electrode, and voluntary activation of motor units. Repetitive nerve stimulation was done with the surface electrodes.    Voluntary activation:  OO       Individual 45       Mean  31     Abnormal if 3 outliers - 20 pairs and 4 outliers - 30 pairs    Reference:  Jovany Montanez et al. Reference values for jitter recorded by concentric needle electrodes in healthy controls: A multicenter study. 2016. Muscle Nerve 53: 351-362    Results:  Low frequency repetitive nerve stimulation of the left ulnar and facial nerves showed no abnormal decrement at rest and after short exercise.  Single fiber EMG was of a good technical quality.  The orbicularis oculi muscle was studied with 20 individual pairs. All individual pairs demonstrated jitter ranging from 16  s to 34  s (<45  s).  The mean jitter of the 20 pairs was 24.7  s (nl< 31  s). There was no transmission blocking.    Interpretation:  There is no electrodiagnostic evidence for neuromuscular transmission defect.    Comment: This study does not provide evidence to support diagnosis of myasthenia gravis.       Vinod Johnson MD  Neuromuscular medicine        Nerve Conduction Studies  Motor Sites      Latency Amplitude Neg. Amp Diff Segment Distance Velocity Neg. Dur Neg Area Diff Temperature Comment   Site (ms) Norm (mV) Norm (%)   cm m/s Norm (ms) (%) ( C)    Left Ulnar (ADM) Motor   Wrist 3.2  < 3.5 7.5  > 5.0  Wrist-ADM 8   5.9  31.3    Bel Elbow 7.1 - 6.9 - -8 Bel Elbow-Wrist 20 51  > 48 6.2 -2 31.3    Abv Elbow 8.5 - 6.9 - 0 Abv Elbow-Bel Elbow 8 57  > 48 6.3 0 31.3      RNS     Trial # Label Amp 1 (mV)  O-P Amp 4 (mV)  O-P Amp % Dif Area 1 (mV ms) Area 4 (mV ms) Area % Dif Rep Rate Train Length Pause Time (min:sec) Comments   Left Abductor Digiti Minimi   Tr 1 Baseline 8.34 8.59 3.0 31.24 29.36 -6.0 3.00 6 00:30    Tr 2 Post Exercise 8.87 9.38 5.8 34.69 31.28 -9.8 3.00 6 01:00    Tr 3 1 min Post 8.90 8.94 0.5 34.10 33.13 -2.8 3.00 6 01:00    Tr 4 2 min Post 8.66 8.89 2.6 34.30 32.83 -4.3 3.00 6 01:00    Tr 5 3 min Post 9.04 9.35 3.5 34.61 33.35 -3.6 3.00 6 00:00    Left Nasalis   Tr 1 Baseline 2.27 2.26 -0.4 7.29 7.19 -1.4 3.00 6 00:30    Tr 2 Post Exercise 3.00 2.93 -2.1 8.73 8.55 -2.1 3.00 6 01:00    Tr 3 1 min Post 2.67 2.66 -0.4 8.69 8.60 -1.0 3.00 6 01:00    Tr 4 2 min Post 2.68 2.65 -1.0 8.85 8.68 -1.9 3.00 6 01:00    Tr 5 3 min Post 2.63 2.64 0.4 8.73 8.52 -2.4 3.00 6 00:00          Left Orbicularis Oculi     Run  Pair  Samples  Blocks  IPI( s) Jitter( s) Jitter Norm( s) GEENA( s) MSD( s) Freq(Hz)   2 1 102 No 380 27.8 <55.3 27.8 29.4 15.1   3 1 82 No 479 23.1  23.1 30.8 19.6   4 1 74 No 323 13.7  13.7 18.8 24.3   6 1 74 No 396 32.2  35.9 32.2 24.4     2 65 No 706 22.4  23.9 22.4 24.4   7 1 53 No 375 21.0  21.0 23.0 22.5   8 1 60 No 267 23.3  23.3 25.7 16.1   9 1 193 No 247 36.2  36.2 36.9 9.3   10 1 54 No 424 16.3  16.8 16.3 16.2   11 1 47 No 301 29.9  29.9 31.4 22.3   12 1 38 No 431 24.0  24.0 33.5 19.0   13 1 94 No 344 17.6  17.6 27.9 18.3   14 1 76 No 424 18.2  18.2 20.8 22.5   15 1 102 No 440 22.0  22.0 37.2 7.3   16 1 95 No 431 25.0  25.0 97.7 15.0   17 1 37 No 474 24.6  24.6 32.1 22.3   18 1 75 No 436 33.2  33.2 41.3 20.4   21 1 54 No 601 34.2  34.2 34.2 20.8   22 1 45 No 632 19.2  19.2 20.3 34.7   23 1 75 No 915 29.3  31.9  29.3 31.5   Mean       451 24.7 <41.8 25.1 32.1 20.1   SD       159 6.3   6.6 16.8 6.5   No Pairs:20       Mean Jitter:24.7  s  Fiber Density:    Blocks:   0.0 %  Jitter:          0.0 %    Normal:          100.0 %           NCS Waveforms:    Motor                                                       Ultrasound Images:

## 2024-01-16 ASSESSMENT — ENCOUNTER SYMPTOMS
HEARTBURN: 1
DIARRHEA: 0
PALPITATIONS: 0
SHORTNESS OF BREATH: 0
CONSTIPATION: 0
DYSURIA: 0
EYE PAIN: 0
NERVOUS/ANXIOUS: 0
ARTHRALGIAS: 0
HEADACHES: 0
NAUSEA: 0
DIZZINESS: 0
FREQUENCY: 0
FEVER: 0
WEAKNESS: 0
COUGH: 0
HEMATURIA: 0
SORE THROAT: 0
MYALGIAS: 1
JOINT SWELLING: 0
CHILLS: 0
HEMATOCHEZIA: 0
PARESTHESIAS: 0
ABDOMINAL PAIN: 0

## 2024-01-18 DIAGNOSIS — G62.0 DRUG-INDUCED POLYNEUROPATHY (H): ICD-10-CM

## 2024-01-19 RX ORDER — GABAPENTIN 300 MG/1
CAPSULE ORAL
Qty: 180 CAPSULE | Refills: 1 | Status: SHIPPED | OUTPATIENT
Start: 2024-01-19 | End: 2024-03-19

## 2024-01-23 ENCOUNTER — OFFICE VISIT (OUTPATIENT)
Dept: FAMILY MEDICINE | Facility: CLINIC | Age: 59
End: 2024-01-23
Payer: COMMERCIAL

## 2024-01-23 VITALS
OXYGEN SATURATION: 98 % | BODY MASS INDEX: 33.36 KG/M2 | HEART RATE: 74 BPM | DIASTOLIC BLOOD PRESSURE: 86 MMHG | TEMPERATURE: 97.6 F | RESPIRATION RATE: 17 BRPM | WEIGHT: 225.25 LBS | HEIGHT: 69 IN | SYSTOLIC BLOOD PRESSURE: 138 MMHG

## 2024-01-23 DIAGNOSIS — N52.9 ERECTILE DYSFUNCTION, UNSPECIFIED ERECTILE DYSFUNCTION TYPE: ICD-10-CM

## 2024-01-23 DIAGNOSIS — R25.2 EXERCISE-INDUCED LEG CRAMPS: ICD-10-CM

## 2024-01-23 DIAGNOSIS — Z12.11 SCREEN FOR COLON CANCER: ICD-10-CM

## 2024-01-23 DIAGNOSIS — E11.9 TYPE 2 DIABETES MELLITUS WITHOUT COMPLICATION, WITHOUT LONG-TERM CURRENT USE OF INSULIN (H): Primary | ICD-10-CM

## 2024-01-23 DIAGNOSIS — Z82.49 FAMILY HISTORY OF CORONARY ARTERY DISEASE: ICD-10-CM

## 2024-01-23 DIAGNOSIS — Z00.00 HEALTHCARE MAINTENANCE: ICD-10-CM

## 2024-01-23 DIAGNOSIS — E03.9 HYPOTHYROIDISM, UNSPECIFIED TYPE: ICD-10-CM

## 2024-01-23 LAB
ALT SERPL W P-5'-P-CCNC: 26 U/L (ref 0–70)
CREAT UR-MCNC: 107 MG/DL
HBA1C MFR BLD: 6.3 % (ref 0–5.6)
LDLC SERPL DIRECT ASSAY-MCNC: 89 MG/DL
MICROALBUMIN UR-MCNC: <12 MG/L
MICROALBUMIN/CREAT UR: NORMAL MG/G{CREAT}
TSH SERPL DL<=0.005 MIU/L-ACNC: 1.46 UIU/ML (ref 0.3–4.2)

## 2024-01-23 PROCEDURE — 82570 ASSAY OF URINE CREATININE: CPT | Performed by: FAMILY MEDICINE

## 2024-01-23 PROCEDURE — 99213 OFFICE O/P EST LOW 20 MIN: CPT | Mod: 25 | Performed by: FAMILY MEDICINE

## 2024-01-23 PROCEDURE — 36415 COLL VENOUS BLD VENIPUNCTURE: CPT | Performed by: FAMILY MEDICINE

## 2024-01-23 PROCEDURE — 82043 UR ALBUMIN QUANTITATIVE: CPT | Performed by: FAMILY MEDICINE

## 2024-01-23 PROCEDURE — 83721 ASSAY OF BLOOD LIPOPROTEIN: CPT | Performed by: FAMILY MEDICINE

## 2024-01-23 PROCEDURE — 99396 PREV VISIT EST AGE 40-64: CPT | Performed by: FAMILY MEDICINE

## 2024-01-23 PROCEDURE — 83036 HEMOGLOBIN GLYCOSYLATED A1C: CPT | Performed by: FAMILY MEDICINE

## 2024-01-23 PROCEDURE — 84443 ASSAY THYROID STIM HORMONE: CPT | Performed by: FAMILY MEDICINE

## 2024-01-23 PROCEDURE — 84460 ALANINE AMINO (ALT) (SGPT): CPT | Performed by: FAMILY MEDICINE

## 2024-01-23 RX ORDER — LEVOTHYROXINE SODIUM 100 UG/1
100 TABLET ORAL DAILY
Qty: 90 TABLET | Refills: 3 | Status: SHIPPED | OUTPATIENT
Start: 2024-01-23

## 2024-01-23 RX ORDER — TADALAFIL 20 MG/1
20 TABLET ORAL EVERY 24 HOURS
Qty: 6 TABLET | Refills: 1 | Status: SHIPPED | OUTPATIENT
Start: 2024-01-23

## 2024-01-23 NOTE — ASSESSMENT & PLAN NOTE
Pledges to schedule colonoscopy when possible in the near future.  Not interested in other forms of colon cancer screening instead.  Confirms that he wishes to forego PSA screening       left upper arm left upper arm

## 2024-01-23 NOTE — ASSESSMENT & PLAN NOTE
No symptoms, discussed current practice of not doing stress testing unless having symptoms.  Further, he tolerates exercise well without chest pain or discomfort.  He already takes an aspirin a day and a statin.  Discussed option of calcium coronary artery scoring as an adjunct.  For now declined

## 2024-01-23 NOTE — ASSESSMENT & PLAN NOTE
Patient with left-sided leg discomfort with exercise, had onset of pressure type pain, sounds similar to claudication, left lower extremity.    Pulses normal in lower extremities and ABIs 1.26 on both legs.  Reassured.

## 2024-01-23 NOTE — ACP (ADVANCE CARE PLANNING)
Have you ever done Advance Care Planning? (For example, a Health Directive, POLST, or a discussion with a medical provider or your loved ones about your wishes): Yes, patient states has an Advance Care Planning document and will bring a copy to the clinic.

## 2024-01-23 NOTE — PROGRESS NOTES
Adult Male Physical  A/P  Healthcare maintenance  Pledges to schedule colonoscopy when possible in the near future.  Not interested in other forms of colon cancer screening instead.  Confirms that he wishes to forego PSA screening        Type 2 diabetes mellitus (H)  A1c 6.3, gradually creeping up as weight comes back on.  Currently on metformin alone.  Also atorvastatin    No change for now, exercising regularly, discussed options for weight management.      Hypothyroidism, unspecified type  Recheck TSH given recent weight gain.    Exercise-induced leg cramps  Patient with left-sided leg discomfort with exercise, had onset of pressure type pain, sounds similar to claudication, left lower extremity.    Pulses normal in lower extremities and ABIs 1.26 on both legs.  Reassured.    Family history of coronary artery disease  No symptoms, discussed current practice of not doing stress testing unless having symptoms.  Further, he tolerates exercise well without chest pain or discomfort.  He already takes an aspirin a day and a statin.  Discussed option of calcium coronary artery scoring as an adjunct.  For now declined         HPI  Current Concerns/ Questions  Concerned about left greater than right leg discomfort with exercise.  Description above.    Concerned about weight gain.  Lost huge amount with his cancer, now sober has cut back.  He has diabetes and wife is on Ozempic.  Not really interested in treatment like that.  PFSH:  Current medications reviewed as follows:  alendronate (FOSAMAX) 35 MG tablet, Take 1 tablet (35 mg) by mouth every 7 days  atorvastatin (LIPITOR) 10 MG tablet, TAKE 1 TABLET(10 MG) BY MOUTH AT BEDTIME  gabapentin (NEURONTIN) 300 MG capsule, TAKE 2 CAPSULES BY MOUTH THREE TIMES DAILY  ibuprofen (ADVIL/MOTRIN) 200 MG tablet, Take 200 mg by mouth every 6 hours as needed  magnesium oxide (MAG-OX) 400 MG tablet, Take 1 tablet (400 mg) by mouth nightly as needed  metFORMIN (GLUCOPHAGE) 500 MG tablet,  TAKE 2 TABLETS(1000 MG) BY MOUTH TWICE DAILY WITH MEALS  omeprazole (PRILOSEC) 20 MG DR capsule, Take 20 mg by mouth daily  predniSONE (DELTASONE) 5 MG tablet, 3 tablets every other day for 1 month, then 2 tablets every other day (Patient taking differently: 2 tablets every other day- tapering off)  sildenafil (VIAGRA) 100 MG tablet, Take 1 tablet (100 mg) by mouth daily as needed    No current facility-administered medications on file prior to visit.     Patient Active Problem List   Diagnosis    Dehydration    Dysphagia    Essential tremor    Healthcare maintenance    Hyperlipidemia    Lymphadenopathy    Malignant neoplasm of pharynx (H)    Myasthenia gravis (H)    Obesity    Physiological tremor    Rash and nonspecific skin eruption    Type 2 diabetes mellitus (H)    Drug-induced polyneuropathy (H24)    Hypothyroidism, unspecified type    Neuropathy    Exercise-induced leg cramps    Erectile dysfunction, unspecified erectile dysfunction type    Family history of coronary artery disease     Past Medical History:   Diagnosis Date    Cancer (H)     Diabetes (H)     Diabetes mellitus (H)     Hyperlipidemia     MG (myasthenia gravis) (H)     Myasthenia gravis (H)      Past Surgical History:   Procedure Laterality Date    G TUBE REPLACEMENT  2/7/2020    IR CHEST PORT PLACEMENT > 5 YRS OF AGE  12/23/2019    IR GASTROSTOMY TUBE CHANGE  2/7/2020    IR GASTROSTOMY TUBE INSERTION  1/24/2020    IR GASTROSTOMY TUBE PERCUTANEOUS PLCMNT  1/24/2020    IR PORT PLACEMENT >5 YEARS  12/23/2019    IR PORT REMOVAL  8/17/2020    IR PORT REMOVAL RIGHT  8/17/2020    IR SITE CHECK/EVALUATION  2/7/2020    IR SITE CHECK/EVALUATION  7/9/2020    IR TUBE REMOVAL  6/26/2020    IR TUBE REMOVAL  6/26/2020    TONSILLECTOMY & ADENOIDECTOMY  aged 7    WISDOM TOOTH EXTRACTION  age 16     Family History   Problem Relation Age of Onset    Dementia Mother     Hypertension Mother     Tremor Mother     Cancer Father     Diabetes Father     Myocardial  "Infarction Father     Hyperlipidemia Father     Sleep Apnea Father      History   Smoking Status    Never   Smokeless Tobacco    Never       Social History     Social History Narrative    Not on file     Immunization History   Administered Date(s) Administered    COVID-19 12+ (2023-24) (Pfizer) 11/01/2023    COVID-19 Bivalent 18+ (Moderna) 01/24/2023    COVID-19 Monovalent 18+ (Moderna) 02/25/2021, 03/25/2021, 11/07/2021, 04/29/2022    Influenza Vaccine 18-64 (Flublok) 09/02/2020    Influenza Vaccine >6 months,quad, PF 11/07/2021, 11/01/2023    Pneumococcal 20 valent Conjugate (Prevnar 20) 01/24/2023    Pneumococcal 23 valent 01/11/2022    TDAP (Adacel,Boostrix) 12/22/2014    Zoster recombinant adjuvanted (SHINGRIX) 01/11/2022, 06/20/2022     Body mass index is 33.75 kg/m .        Objective  Physical Exam  Vitals:    01/23/24 1019 01/23/24 1027   BP: (!) 147/85 138/86   BP Location: Left arm    Patient Position: Sitting    Cuff Size: Adult Large    Pulse: 74    Resp: 17    Temp: 97.6  F (36.4  C)    SpO2: 98%    Weight: 102.2 kg (225 lb 4 oz)    Height: 1.74 m (5' 8.5\")        Gen- alert and oriented x3, no acute distress  HEENT- Normocephalic atraumatic   pupils equal and reactive, EOMI.    TMs visualized and normal, ear canals normal.    Mouth moist with normal mucosa no ulceration, dentition normal or in good repair.  Neck- supple, no adenopathy or thyromegaly  Chest- Normal chest wall apperance, normal inspiration and expiration.  Clear to asculation.  CV- Regular rate and rhythm, normal tones, no murmus, gallops or rubs.  Abd-  Soft, nodistended, nontender.  Normal bowel sounds, no mass or organ enlargement.  Genitalia-  was not done  FRANTZ: was not done  Ext- Atraumatic,  No synovial thickening. Good perfusion, no edema. Periph pulses detected  Skin- warm and dry, no rash  Neuro- Cranial nerves grossly intact.  Normal gait, normal strength.  Reflexes symmetric.  Coordination intact.    Diagnostics  Results for " orders placed or performed in visit on 01/23/24   Hemoglobin A1c     Status: Abnormal   Result Value Ref Range    Hemoglobin A1C 6.3 (H) 0.0 - 5.6 %

## 2024-01-23 NOTE — ASSESSMENT & PLAN NOTE
A1c 6.3, gradually creeping up as weight comes back on.  Currently on metformin alone.  Also atorvastatin    No change for now, exercising regularly, discussed options for weight management.

## 2024-01-30 NOTE — NURSING NOTE
Chief Complaint   Patient presents with     RECHECK     RETURN MYASTHENIA GRAVIS     Sabino Levy    
17

## 2024-02-09 DIAGNOSIS — E11.9 TYPE 2 DIABETES MELLITUS WITHOUT COMPLICATION, WITHOUT LONG-TERM CURRENT USE OF INSULIN (H): ICD-10-CM

## 2024-02-12 ENCOUNTER — OFFICE VISIT (OUTPATIENT)
Dept: NEUROLOGY | Facility: CLINIC | Age: 59
End: 2024-02-12
Payer: COMMERCIAL

## 2024-02-12 VITALS
HEIGHT: 69 IN | BODY MASS INDEX: 33.84 KG/M2 | SYSTOLIC BLOOD PRESSURE: 131 MMHG | WEIGHT: 228.5 LBS | HEART RATE: 88 BPM | OXYGEN SATURATION: 99 % | DIASTOLIC BLOOD PRESSURE: 82 MMHG

## 2024-02-12 DIAGNOSIS — T38.0X5A STEROID-INDUCED OSTEOPENIA: Primary | ICD-10-CM

## 2024-02-12 DIAGNOSIS — G70.00 MYASTHENIA GRAVIS IN REMISSION (H): ICD-10-CM

## 2024-02-12 DIAGNOSIS — M85.80 STEROID-INDUCED OSTEOPENIA: Primary | ICD-10-CM

## 2024-02-12 PROCEDURE — 99213 OFFICE O/P EST LOW 20 MIN: CPT | Performed by: PSYCHIATRY & NEUROLOGY

## 2024-02-12 ASSESSMENT — PAIN SCALES - GENERAL: PAINLEVEL: NO PAIN (0)

## 2024-02-12 NOTE — PROGRESS NOTES
"Israel Quiroz MD  [place], February 12, 2024    Dear Dr. Quiroz,    I had the pleasure to see Mr. 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  He is MG is currently in remission.  He had a recent single-fiber EMG of the orbicularis oculi that was negative.  His MG ADL score is currently 0 and he does not complain of any MG symptoms.  He is on a low-dose prednisone, 10 mg every other day, tolerating it overall well.  However, in the last year his hemoglobin A1c has increased a little bit and is now 6.3%, close to the cutoff for diagnosis of diabetes.  2 years ago, it was 5.4%.  He is not on pyridostigmine or nonsteroidal immunosuppressant drugs, at this point.    He had a DEXA scan last year showing mild osteopenia and he is taking alendronate 35 mg weekly.    Neuro exam was not repeated.    /82 (BP Location: Left arm, Patient Position: Sitting, Cuff Size: Adult Regular)   Pulse 88   Ht 1.745 m (5' 8.7\")   Wt 103.6 kg (228 lb 8 oz)   SpO2 99%   BMI 34.04 kg/m      In summary, Mr. Morales's MG is in remission.  We discussed continuing a low-dose prednisone indefinitely, versus stopping it.  Because of the recent rise in his A1c, he would favor stopping it.  We will reduce it to 5 mg every other day for the next 6 weeks, and then discontinue.  I told him that it is always possible that the MG could relapse after steroid discontinuation, but even in that case, it should be manageable.  He knows to contact me if he experience any recurrence of his ptosis, diplopia, dysphagia, dysarthria, sialorrhea, or weak voice.    I will repeat a DEXA scan this year.  He should continue his current alendronate dose    Follow-up in 1 year, sooner if needed.    Sincerely,    Eliud Page MD, FAAN    Billing is [facility] level 3 (low) based on: #1 problems assessed: One stable chronic illness (MG), #2 " Risk: Prescription drug management.    The longitudinal plan of care for the condition(s) below were addressed during this visit. Due to the added complexity in care, I will continue to support Albert in the subsequent management of this condition(s) and with the ongoing continuity of care of this condition(s): myasthenia gravis.

## 2024-02-12 NOTE — NURSING NOTE
Chief Complaint   Patient presents with    Myasthenia Gravis     Return          Vitals were taken and medications were reconciled.  Misael Goodrich, EMT  9:31 AM

## 2024-02-12 NOTE — PATIENT INSTRUCTIONS
Your myasthenia is in remission right now.      Please reduce your prednisone dose to 5 mg every other day for the next 6 weeks, and then you can stop it.    If during 2024, you notice any worsening droopy eye, double vision, change in your speech, swallowing, or voice, please let me know immediately.    Repeat bone density scan this year (not urgent).  For now I would continue your weekly Fosamax tablet.

## 2024-02-12 NOTE — LETTER
"2/12/2024       RE: Cj Morales  [address] [place] [address]       Dear Colleague,    Thank you for referring your patient, Cj Morales, to the [facility] NEUROLOGY CLINIC [place] at [facility]. Please see a copy of my visit note below.    Israel Quiroz MD  [place], February 12, 2024    Dear Dr. Quiroz,    I had the pleasure to see Mr. 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  He is MG is currently in remission.  He had a recent single-fiber EMG of the orbicularis oculi that was negative.  His MG ADL score is currently 0 and he does not complain of any MG symptoms.  He is on a low-dose prednisone, 10 mg every other day, tolerating it overall well.  However, in the last year his hemoglobin A1c has increased a little bit and is now 6.3%, close to the cutoff for diagnosis of diabetes.  2 years ago, it was 5.4%.  He is not on pyridostigmine or nonsteroidal immunosuppressant drugs, at this point.    He had a DEXA scan last year showing mild osteopenia and he is taking alendronate 35 mg weekly.    Neuro exam was not repeated.    /82 (BP Location: Left arm, Patient Position: Sitting, Cuff Size: Adult Regular)   Pulse 88   Ht 1.745 m (5' 8.7\")   Wt 103.6 kg (228 lb 8 oz)   SpO2 99%   BMI 34.04 kg/m      In summary, Mr. Morales's MG is in remission.  We discussed continuing a low-dose prednisone indefinitely, versus stopping it.  Because of the recent rise in his A1c, he would favor stopping it.  We will reduce it to 5 mg every other day for the next 6 weeks, and then discontinue.  I told him that it is always possible that the MG could relapse after steroid discontinuation, but even in that case, it should be manageable.  He knows to contact me if he experience any recurrence of his ptosis, diplopia, " dysphagia, dysarthria, sialorrhea, or weak voice.    I will repeat a DEXA scan this year.  He should continue his current alendronate dose    Follow-up in 1 year, sooner if needed.        Billing is MDM level 3 (low) based on: #1 problems assessed: One stable chronic illness (MG), #2 Risk: Prescription drug management.    The longitudinal plan of care for the condition(s) below were addressed during this visit. Due to the added complexity in care, I will continue to support Albert in the subsequent management of this condition(s) and with the ongoing continuity of care of this condition(s): myasthenia gravis.            Again, thank you for allowing me to participate in the care of your patient.      Sincerely,    Eliud Page MD

## 2024-02-29 DIAGNOSIS — M81.8 STEROID-INDUCED OSTEOPOROSIS: ICD-10-CM

## 2024-02-29 DIAGNOSIS — T38.0X5A STEROID-INDUCED OSTEOPOROSIS: ICD-10-CM

## 2024-03-01 RX ORDER — ALENDRONATE SODIUM 35 MG/1
TABLET ORAL
Qty: 12 TABLET | Refills: 1 | Status: SHIPPED | OUTPATIENT
Start: 2024-03-01 | End: 2024-08-20

## 2024-03-06 DIAGNOSIS — E78.5 HYPERLIPIDEMIA, UNSPECIFIED HYPERLIPIDEMIA TYPE: ICD-10-CM

## 2024-03-06 RX ORDER — ATORVASTATIN CALCIUM 10 MG/1
TABLET, FILM COATED ORAL
Qty: 90 TABLET | Refills: 3 | Status: SHIPPED | OUTPATIENT
Start: 2024-03-06

## 2024-03-19 DIAGNOSIS — G62.0 DRUG-INDUCED POLYNEUROPATHY (H): ICD-10-CM

## 2024-03-19 RX ORDER — GABAPENTIN 300 MG/1
CAPSULE ORAL
Qty: 180 CAPSULE | Refills: 1 | Status: SHIPPED | OUTPATIENT
Start: 2024-03-19 | End: 2024-05-28

## 2024-05-11 DIAGNOSIS — E11.9 TYPE 2 DIABETES MELLITUS WITHOUT COMPLICATION, WITHOUT LONG-TERM CURRENT USE OF INSULIN (H): ICD-10-CM

## 2024-05-13 ENCOUNTER — ANCILLARY PROCEDURE (OUTPATIENT)
Dept: BONE DENSITY | Facility: CLINIC | Age: 59
End: 2024-05-13
Attending: PSYCHIATRY & NEUROLOGY
Payer: COMMERCIAL

## 2024-05-13 DIAGNOSIS — T38.0X5A STEROID-INDUCED OSTEOPENIA: ICD-10-CM

## 2024-05-13 DIAGNOSIS — M85.80 STEROID-INDUCED OSTEOPENIA: ICD-10-CM

## 2024-05-13 PROCEDURE — 77085 DXA BONE DENSITY AXL VRT FX: CPT | Mod: TC | Performed by: RADIOLOGY

## 2024-05-25 DIAGNOSIS — G62.0 DRUG-INDUCED POLYNEUROPATHY (H): ICD-10-CM

## 2024-05-28 RX ORDER — GABAPENTIN 300 MG/1
CAPSULE ORAL
Qty: 180 CAPSULE | Refills: 3 | Status: SHIPPED | OUTPATIENT
Start: 2024-05-28

## 2024-06-27 ENCOUNTER — TRANSFERRED RECORDS (OUTPATIENT)
Dept: HEALTH INFORMATION MANAGEMENT | Facility: CLINIC | Age: 59
End: 2024-06-27
Payer: COMMERCIAL

## 2024-07-08 PROBLEM — D12.3 ADENOMATOUS POLYP OF TRANSVERSE COLON: Status: ACTIVE | Noted: 2024-07-08

## 2024-08-11 ENCOUNTER — HEALTH MAINTENANCE LETTER (OUTPATIENT)
Age: 59
End: 2024-08-11

## 2024-08-12 ENCOUNTER — TRANSFERRED RECORDS (OUTPATIENT)
Dept: HEALTH INFORMATION MANAGEMENT | Facility: CLINIC | Age: 59
End: 2024-08-12
Payer: COMMERCIAL

## 2024-08-14 DIAGNOSIS — E11.9 TYPE 2 DIABETES MELLITUS WITHOUT COMPLICATION, WITHOUT LONG-TERM CURRENT USE OF INSULIN (H): ICD-10-CM

## 2024-08-20 DIAGNOSIS — M81.8 STEROID-INDUCED OSTEOPOROSIS: ICD-10-CM

## 2024-08-20 DIAGNOSIS — T38.0X5A STEROID-INDUCED OSTEOPOROSIS: ICD-10-CM

## 2024-08-20 RX ORDER — ALENDRONATE SODIUM 35 MG/1
TABLET ORAL
Qty: 12 TABLET | Refills: 1 | Status: SHIPPED | OUTPATIENT
Start: 2024-08-20

## 2024-08-26 ENCOUNTER — TRANSFERRED RECORDS (OUTPATIENT)
Dept: HEALTH INFORMATION MANAGEMENT | Facility: CLINIC | Age: 59
End: 2024-08-26
Payer: COMMERCIAL

## 2024-09-24 ENCOUNTER — TRANSFERRED RECORDS (OUTPATIENT)
Dept: HEALTH INFORMATION MANAGEMENT | Facility: CLINIC | Age: 59
End: 2024-09-24
Payer: COMMERCIAL

## 2024-09-24 LAB — RETINOPATHY: NEGATIVE

## 2024-10-18 ENCOUNTER — IMMUNIZATION (OUTPATIENT)
Dept: FAMILY MEDICINE | Facility: CLINIC | Age: 59
End: 2024-10-18
Payer: COMMERCIAL

## 2024-10-18 PROCEDURE — 90471 IMMUNIZATION ADMIN: CPT

## 2024-10-18 PROCEDURE — 90480 ADMN SARSCOV2 VAC 1/ONLY CMP: CPT

## 2024-10-18 PROCEDURE — 90673 RIV3 VACCINE NO PRESERV IM: CPT

## 2024-10-18 PROCEDURE — 91320 SARSCV2 VAC 30MCG TRS-SUC IM: CPT

## 2024-11-11 DIAGNOSIS — E11.9 TYPE 2 DIABETES MELLITUS WITHOUT COMPLICATION, WITHOUT LONG-TERM CURRENT USE OF INSULIN (H): ICD-10-CM

## 2025-01-22 DIAGNOSIS — E03.9 HYPOTHYROIDISM, UNSPECIFIED TYPE: ICD-10-CM

## 2025-01-22 RX ORDER — LEVOTHYROXINE SODIUM 100 UG/1
100 TABLET ORAL DAILY
Qty: 90 TABLET | Refills: 0 | Status: SHIPPED | OUTPATIENT
Start: 2025-01-22

## 2025-02-02 ASSESSMENT — ACTIVITIES OF DAILY LIVING (ADL)
MYC_MG-ADL-TOTAL_SCORE: 0
BREATHING: NORMAL
TALKING: INTERMITTENT SLURRING OR NASAL SPEECH
IMPAIRMENT OF ABILITY TO RISE FROM CHAIR: NORMAL
SWALLOWING: RARE CHOKING
CHEWING: NORMAL
IMPAIRMENT OF ABILITY TO BRUSH TEETH/COMB HAIR: NORMAL
EYELID DROOP: PRESENT, BUT NOT DAILY

## 2025-02-03 ENCOUNTER — OFFICE VISIT (OUTPATIENT)
Dept: NEUROLOGY | Facility: CLINIC | Age: 60
End: 2025-02-03
Payer: COMMERCIAL

## 2025-02-03 VITALS
OXYGEN SATURATION: 99 % | SYSTOLIC BLOOD PRESSURE: 138 MMHG | HEART RATE: 71 BPM | DIASTOLIC BLOOD PRESSURE: 78 MMHG | RESPIRATION RATE: 16 BRPM

## 2025-02-03 DIAGNOSIS — G70.01 MYASTHENIA GRAVIS WITH EXACERBATION (H): Primary | ICD-10-CM

## 2025-02-03 PROCEDURE — 99214 OFFICE O/P EST MOD 30 MIN: CPT | Performed by: PSYCHIATRY & NEUROLOGY

## 2025-02-03 PROCEDURE — G2211 COMPLEX E/M VISIT ADD ON: HCPCS | Performed by: PSYCHIATRY & NEUROLOGY

## 2025-02-03 NOTE — PROGRESS NOTES
Israel Quiroz MD  [place], February 3, 2025    Dear Dr. Quiroz,     I had the pleasure to see Mr. Morales in follow-up at the [facility]/neuromuscular clinic for his acetylcholine receptor antibody positive myasthenia.  In the past he had predominantly ocular symptoms that were very well-controlled with a very low dose of prednisone of 10 mg every other day.  Because of prolonged remission and his concerns about his borderline hemoglobin A1c in 2024 (6.3%), we decided to gradually stop the prednisone on February 2024.  At that time his MG ADL score was 0.  In the past he was given pyridostigmine but he did not like the drug and he stopped it, and in fact felt better without it.  He has not taken nonsteroidal immunosuppressant such as azathioprine either.    After stopping the prednisone, some of his myasthenia symptoms returned.  He is complaining of occasional eyelid fluttering/twitching, and more ptosis on the right side that occurs daily.  He denies diplopia.  He is having an increased frequency of episodes of throat clearing or tension of his throat, possible very mild dysphagia.  He also develops occasional episodes of slurred speech a couple times a month.  Denies dyspnea on exertion, jaw fatigue when chewing, weakness of his upper extremities or lower extremities.  He has no difficulty getting up from a chair and he does not have to slow down or stop when brushing his teeth or combing his hair.    MG ADL score is 4 today with 2 points for ptosis, 1 for dysphagia, and 1 for slurred speech.  Previously was 0.    He had a DEXA scan in May 2024 showing improvement of bone density compared to the previous scan from 2023 that showed mild osteopenia.  He is still taking alendronate 35 mg weekly.     Current Outpatient Medications   Medication Sig Dispense Refill    alendronate (FOSAMAX) 35 MG tablet TAKE 1 TABLET(35 MG) BY MOUTH EVERY 7 DAYS 12 tablet 1    atorvastatin (LIPITOR) 10 MG tablet  TAKE 1 TABLET(10 MG) BY MOUTH AT BEDTIME 90 tablet 3    gabapentin (NEURONTIN) 300 MG capsule TAKE 2 CAPSULES BY MOUTH THREE TIMES DAILY 180 capsule 3    ibuprofen (ADVIL/MOTRIN) 200 MG tablet Take 200 mg by mouth every 6 hours as needed      levothyroxine (SYNTHROID/LEVOTHROID) 100 MCG tablet TAKE 1 TABLET(100 MCG) BY MOUTH DAILY 90 tablet 0    magnesium oxide (MAG-OX) 400 MG tablet Take 1 tablet (400 mg) by mouth nightly as needed 90 tablet 3    metFORMIN (GLUCOPHAGE) 500 MG tablet TAKE 2 TABLETS(1000 MG) BY MOUTH TWICE DAILY WITH MEALS 360 tablet 0    omeprazole (PRILOSEC) 20 MG DR capsule Take 20 mg by mouth daily      predniSONE (DELTASONE) 5 MG tablet 3 tablets every other day for 1 month, then 2 tablets every other day (Patient taking differently: 2 tablets every other day- tapering off) 75 tablet 1    sildenafil (VIAGRA) 100 MG tablet Take 1 tablet (100 mg) by mouth daily as needed 10 tablet 11    tadalafil (ADCIRCA/CIALIS) 20 MG tablet Take 1 tablet (20 mg) by mouth every 24 hours 6 tablet 1     No current facility-administered medications for this visit.       EXAM: He is awake, alert, oriented x 3.  I did not see any ptosis with sustained upward gaze today but I did see some right eyelid fasciculations and mild ptosis after he gazed to the left or right several times.  Extraocular muscles were intact; there was no diplopia in any gaze direction.  There was no weakness of orbicularis oculi, or orbicularis oris.  There was no definite weakness of uvula, jaw, or tongue today but his speech was a bit nasal indicating velopalatal insufficiency.  Neck flexion and extension were full strength.  Strength of deltoids, biceps, triceps, handgrip, hip flexion was normal today.    /78 (BP Location: Right arm, Patient Position: Sitting, Cuff Size: Adult Large)   Pulse 71   Resp 16   SpO2 99%        In summary Mr. Morales's myasthenia has mildly relapsed since he discontinued the prednisone.  He has more nasal  speech than the last visit, and complains of occasional dysarthria, sensation of throat tightening/clearing, and right eyelid twitching or ptosis.  I think he should be put back on prednisone 10 mg daily.  We both understand that this can increase the risk of developing type 2 diabetes, and may cause some hypertension and other side effects, but I think that with close attention from his primary care doctor, most of those side effects can be manageable if the dose is kept low.    We will repeat his DEXA bone density scan later this year and I would like him to return to clinic in 4 months, sooner if needed.  I asked him to send me a [employer] message in about a month to make sure that the symptoms he reported today have improved by taking prednisone 10 mg daily.     Sincerely,     Eliud Page MD, FAAN     Billing is MDM level 4 (moderate) based on: #1 problems assessed: 1 chronic disorder with progression, exacerbation, or side effects of treatment (myasthenia gravis), and #2 Risk: Prescription drug management.     The longitudinal plan of care for the condition(s) below were addressed during this visit. Due to the added complexity in care, I will continue to support Albert in the subsequent management of this condition(s) and with the ongoing continuity of care of this condition(s): myasthenia gravis.

## 2025-02-03 NOTE — NURSING NOTE
Chief Complaint   Patient presents with    RECHECK     Here for a follow up, confirmed with patient     Maria M Jimenez

## 2025-02-03 NOTE — PATIENT INSTRUCTIONS
I think that you would do overall a little better if we would restart your prednisone at 10 mg daily.  That is what I recommend, and I will let Dr. Quiroz know about this as well.    We should repeat your bone density scan by the middle of the year.    Please send me a [employer] message in about a month to make sure you are doing okay with the prednisone, and to make sure that the symptoms you reported to me today have improved by taking it.    Follow-up with me in 4 months (June 2025)

## 2025-02-03 NOTE — LETTER
2/3/2025       RE: Cj Morales  [address] [place] [address]     Dear Colleague,    Thank you for referring your patient, Cj Morales, to the [facility] NEUROLOGY CLINIC [place] at [facility]. Please see a copy of my visit note below.    Israel Quiroz MD  [place], February 3, 2025    Dear Dr. Quiroz,     I had the pleasure to see Mr. Morales in follow-up at the [facility]/neuromuscular clinic for his acetylcholine receptor antibody positive myasthenia.  In the past he had predominantly ocular symptoms that were very well-controlled with a very low dose of prednisone of 10 mg every other day.  Because of prolonged remission and his concerns about his borderline hemoglobin A1c in 2024 (6.3%), we decided to gradually stop the prednisone on February 2024.  At that time his MG ADL score was 0.  In the past he was given pyridostigmine but he did not like the drug and he stopped it, and in fact felt better without it.  He has not taken nonsteroidal immunosuppressant such as azathioprine either.    After stopping the prednisone, some of his myasthenia symptoms returned.  He is complaining of occasional eyelid fluttering/twitching, and more ptosis on the right side that occurs daily.  He denies diplopia.  He is having an increased frequency of episodes of throat clearing or tension of his throat, possible very mild dysphagia.  He also develops occasional episodes of slurred speech a couple times a month.  Denies dyspnea on exertion, jaw fatigue when chewing, weakness of his upper extremities or lower extremities.  He has no difficulty getting up from a chair and he does not have to slow down or stop when brushing his teeth or combing his hair.    MG ADL score is 4 today with 2 points for ptosis, 1 for dysphagia, and 1 for slurred speech.  Previously was 0.    He had a DEXA scan in May 2024 showing improvement of  bone density compared to the previous scan from 2023 that showed mild osteopenia.  He is still taking alendronate 35 mg weekly.     Current Outpatient Medications   Medication Sig Dispense Refill     alendronate (FOSAMAX) 35 MG tablet TAKE 1 TABLET(35 MG) BY MOUTH EVERY 7 DAYS 12 tablet 1     atorvastatin (LIPITOR) 10 MG tablet TAKE 1 TABLET(10 MG) BY MOUTH AT BEDTIME 90 tablet 3     gabapentin (NEURONTIN) 300 MG capsule TAKE 2 CAPSULES BY MOUTH THREE TIMES DAILY 180 capsule 3     ibuprofen (ADVIL/MOTRIN) 200 MG tablet Take 200 mg by mouth every 6 hours as needed       levothyroxine (SYNTHROID/LEVOTHROID) 100 MCG tablet TAKE 1 TABLET(100 MCG) BY MOUTH DAILY 90 tablet 0     magnesium oxide (MAG-OX) 400 MG tablet Take 1 tablet (400 mg) by mouth nightly as needed 90 tablet 3     metFORMIN (GLUCOPHAGE) 500 MG tablet TAKE 2 TABLETS(1000 MG) BY MOUTH TWICE DAILY WITH MEALS 360 tablet 0     omeprazole (PRILOSEC) 20 MG DR capsule Take 20 mg by mouth daily       predniSONE (DELTASONE) 5 MG tablet 3 tablets every other day for 1 month, then 2 tablets every other day (Patient taking differently: 2 tablets every other day- tapering off) 75 tablet 1     sildenafil (VIAGRA) 100 MG tablet Take 1 tablet (100 mg) by mouth daily as needed 10 tablet 11     tadalafil (ADCIRCA/CIALIS) 20 MG tablet Take 1 tablet (20 mg) by mouth every 24 hours 6 tablet 1     No current facility-administered medications for this visit.       EXAM: He is awake, alert, oriented x 3.  I did not see any ptosis with sustained upward gaze today but I did see some right eyelid fasciculations and mild ptosis after he gazed to the left or right several times.  Extraocular muscles were intact; there was no diplopia in any gaze direction.  There was no weakness of orbicularis oculi, or orbicularis oris.  There was no definite weakness of uvula, jaw, or tongue today but his speech was a bit nasal indicating velopalatal insufficiency.  Neck flexion and extension were  full strength.  Strength of deltoids, biceps, triceps, handgrip, hip flexion was normal today.    /78 (BP Location: Right arm, Patient Position: Sitting, Cuff Size: Adult Large)   Pulse 71   Resp 16   SpO2 99%        In summary Mr. Morales's myasthenia has mildly relapsed since he discontinued the prednisone.  He has more nasal speech than the last visit, and complains of occasional dysarthria, sensation of throat tightening/clearing, and right eyelid twitching or ptosis.  I think he should be put back on prednisone 10 mg daily.  We both understand that this can increase the risk of developing type 2 diabetes, and may cause some hypertension and other side effects, but I think that with close attention from his primary care doctor, most of those side effects can be manageable if the dose is kept low.    We will repeat his DEXA bone density scan later this year and I would like him to return to clinic in 4 months, sooner if needed.  I asked him to send me a "[employer] message in about a month to make sure that the symptoms he reported today have improved by taking prednisone 10 mg daily.     Sincerely,     Eliud Page MD, FAAN     Billing is MDM level 4 (moderate) based on: #1 problems assessed: 1 chronic disorder with progression, exacerbation, or side effects of treatment (myasthenia gravis), and #2 Risk: Prescription drug management.     The longitudinal plan of care for the condition(s) below were addressed during this visit. Due to the added complexity in care, I will continue to support Albert in the subsequent management of this condition(s) and with the ongoing continuity of care of this condition(s): myasthenia gravis.       Again, thank you for allowing me to participate in the care of your patient.      Sincerely,    Eliud Page MD

## 2025-02-05 RX ORDER — PREDNISONE 10 MG/1
10 TABLET ORAL DAILY
Qty: 90 TABLET | Refills: 2 | Status: SHIPPED | OUTPATIENT
Start: 2025-02-05

## 2025-02-09 DIAGNOSIS — E11.9 TYPE 2 DIABETES MELLITUS WITHOUT COMPLICATION, WITHOUT LONG-TERM CURRENT USE OF INSULIN (H): ICD-10-CM

## 2025-02-20 DIAGNOSIS — G62.0 DRUG-INDUCED POLYNEUROPATHY: ICD-10-CM

## 2025-02-20 RX ORDER — GABAPENTIN 300 MG/1
CAPSULE ORAL
Qty: 180 CAPSULE | Refills: 3 | Status: SHIPPED | OUTPATIENT
Start: 2025-02-20

## 2025-02-23 ENCOUNTER — HEALTH MAINTENANCE LETTER (OUTPATIENT)
Age: 60
End: 2025-02-23

## 2025-03-20 SDOH — HEALTH STABILITY: PHYSICAL HEALTH: ON AVERAGE, HOW MANY MINUTES DO YOU ENGAGE IN EXERCISE AT THIS LEVEL?: 30 MIN

## 2025-03-20 SDOH — HEALTH STABILITY: PHYSICAL HEALTH: ON AVERAGE, HOW MANY DAYS PER WEEK DO YOU ENGAGE IN MODERATE TO STRENUOUS EXERCISE (LIKE A BRISK WALK)?: 3 DAYS

## 2025-03-20 ASSESSMENT — SOCIAL DETERMINANTS OF HEALTH (SDOH): HOW OFTEN DO YOU GET TOGETHER WITH FRIENDS OR RELATIVES?: ONCE A WEEK

## 2025-03-21 DIAGNOSIS — E78.5 HYPERLIPIDEMIA, UNSPECIFIED HYPERLIPIDEMIA TYPE: ICD-10-CM

## 2025-03-24 PROBLEM — E86.0 DEHYDRATION: Status: RESOLVED | Noted: 2020-01-18 | Resolved: 2025-03-24

## 2025-03-24 PROBLEM — R13.10 DYSPHAGIA: Status: RESOLVED | Noted: 2020-01-18 | Resolved: 2025-03-24

## 2025-03-24 RX ORDER — ATORVASTATIN CALCIUM 10 MG/1
TABLET, FILM COATED ORAL
Qty: 90 TABLET | Refills: 3 | Status: SHIPPED | OUTPATIENT
Start: 2025-03-24

## 2025-03-25 ENCOUNTER — OFFICE VISIT (OUTPATIENT)
Dept: FAMILY MEDICINE | Facility: CLINIC | Age: 60
End: 2025-03-25
Payer: COMMERCIAL

## 2025-03-25 VITALS
BODY MASS INDEX: 31.99 KG/M2 | TEMPERATURE: 97.2 F | HEART RATE: 64 BPM | SYSTOLIC BLOOD PRESSURE: 122 MMHG | OXYGEN SATURATION: 100 % | HEIGHT: 69 IN | WEIGHT: 216 LBS | DIASTOLIC BLOOD PRESSURE: 77 MMHG | RESPIRATION RATE: 14 BRPM

## 2025-03-25 DIAGNOSIS — G25.0 ESSENTIAL TREMOR: ICD-10-CM

## 2025-03-25 DIAGNOSIS — E78.5 HYPERLIPIDEMIA, UNSPECIFIED HYPERLIPIDEMIA TYPE: ICD-10-CM

## 2025-03-25 DIAGNOSIS — D12.3 ADENOMATOUS POLYP OF TRANSVERSE COLON: ICD-10-CM

## 2025-03-25 DIAGNOSIS — N52.9 ERECTILE DYSFUNCTION, UNSPECIFIED ERECTILE DYSFUNCTION TYPE: ICD-10-CM

## 2025-03-25 DIAGNOSIS — C14.0 MALIGNANT NEOPLASM OF PHARYNX (H): ICD-10-CM

## 2025-03-25 DIAGNOSIS — E11.9 TYPE 2 DIABETES MELLITUS WITHOUT COMPLICATION, WITHOUT LONG-TERM CURRENT USE OF INSULIN (H): ICD-10-CM

## 2025-03-25 DIAGNOSIS — Z00.00 HEALTHCARE MAINTENANCE: Primary | ICD-10-CM

## 2025-03-25 DIAGNOSIS — E03.9 HYPOTHYROIDISM, UNSPECIFIED TYPE: ICD-10-CM

## 2025-03-25 DIAGNOSIS — G70.00 MYASTHENIA GRAVIS (H): ICD-10-CM

## 2025-03-25 LAB
ALT SERPL W P-5'-P-CCNC: 25 U/L (ref 0–70)
ANION GAP SERPL CALCULATED.3IONS-SCNC: 9 MMOL/L (ref 7–15)
BUN SERPL-MCNC: 23.6 MG/DL (ref 8–23)
CALCIUM SERPL-MCNC: 9.6 MG/DL (ref 8.8–10.4)
CHLORIDE SERPL-SCNC: 102 MMOL/L (ref 98–107)
CHOLEST SERPL-MCNC: 169 MG/DL
CREAT SERPL-MCNC: 0.97 MG/DL (ref 0.67–1.17)
CREAT UR-MCNC: 109 MG/DL
EGFRCR SERPLBLD CKD-EPI 2021: 89 ML/MIN/1.73M2
EST. AVERAGE GLUCOSE BLD GHB EST-MCNC: 140 MG/DL
FASTING STATUS PATIENT QL REPORTED: YES
FASTING STATUS PATIENT QL REPORTED: YES
GLUCOSE SERPL-MCNC: 129 MG/DL (ref 70–99)
HBA1C MFR BLD: 6.5 % (ref 0–5.6)
HCO3 SERPL-SCNC: 30 MMOL/L (ref 22–29)
HDLC SERPL-MCNC: 45 MG/DL
LDLC SERPL CALC-MCNC: 92 MG/DL
MICROALBUMIN UR-MCNC: <12 MG/L
MICROALBUMIN/CREAT UR: NORMAL MG/G{CREAT}
NONHDLC SERPL-MCNC: 124 MG/DL
POTASSIUM SERPL-SCNC: 4.8 MMOL/L (ref 3.4–5.3)
SODIUM SERPL-SCNC: 141 MMOL/L (ref 135–145)
TRIGL SERPL-MCNC: 161 MG/DL
TSH SERPL DL<=0.005 MIU/L-ACNC: 4.81 UIU/ML (ref 0.3–4.2)

## 2025-03-25 PROCEDURE — 82043 UR ALBUMIN QUANTITATIVE: CPT | Performed by: FAMILY MEDICINE

## 2025-03-25 PROCEDURE — 3078F DIAST BP <80 MM HG: CPT | Performed by: FAMILY MEDICINE

## 2025-03-25 PROCEDURE — 99213 OFFICE O/P EST LOW 20 MIN: CPT | Mod: 25 | Performed by: FAMILY MEDICINE

## 2025-03-25 PROCEDURE — 3074F SYST BP LT 130 MM HG: CPT | Performed by: FAMILY MEDICINE

## 2025-03-25 PROCEDURE — 99396 PREV VISIT EST AGE 40-64: CPT | Performed by: FAMILY MEDICINE

## 2025-03-25 PROCEDURE — 36415 COLL VENOUS BLD VENIPUNCTURE: CPT | Performed by: FAMILY MEDICINE

## 2025-03-25 PROCEDURE — 84443 ASSAY THYROID STIM HORMONE: CPT | Performed by: FAMILY MEDICINE

## 2025-03-25 PROCEDURE — 83036 HEMOGLOBIN GLYCOSYLATED A1C: CPT | Performed by: FAMILY MEDICINE

## 2025-03-25 PROCEDURE — 84460 ALANINE AMINO (ALT) (SGPT): CPT | Performed by: FAMILY MEDICINE

## 2025-03-25 PROCEDURE — 80048 BASIC METABOLIC PNL TOTAL CA: CPT | Performed by: FAMILY MEDICINE

## 2025-03-25 PROCEDURE — 80061 LIPID PANEL: CPT | Performed by: FAMILY MEDICINE

## 2025-03-25 PROCEDURE — 82570 ASSAY OF URINE CREATININE: CPT | Performed by: FAMILY MEDICINE

## 2025-03-25 RX ORDER — TADALAFIL 20 MG/1
20 TABLET ORAL EVERY 24 HOURS
Qty: 6 TABLET | Refills: 1 | Status: SHIPPED | OUTPATIENT
Start: 2025-03-25

## 2025-03-25 NOTE — ASSESSMENT & PLAN NOTE
A1c back up to 6.5.  On metformin alone, thousand twice daily, previously on glipizide but stopped because of low blood sugars.  On prednisone, will be for the next year    Recommend more close surveillance, check A1c 3-month  If trending up, consider additional medication, perhaps GLP-1 given weight  Up-to-date ophthalmology  Foot exam done today  On statin  Blood pressure control

## 2025-03-25 NOTE — PROGRESS NOTES
Prior to immunization administration, verified patients identity using patient s name and date of birth. Please see Immunization Activity for additional information.     Screening Questionnaire for Adult Immunization    Are you sick today?   No   Do you have allergies to medications, food, a vaccine component or latex?   Yes   Have you ever had a serious reaction after receiving a vaccination?   No   Do you have a long-term health problem with heart, lung, kidney, or metabolic disease (e.g., diabetes), asthma, a blood disorder, no spleen, complement component deficiency, a cochlear implant, or a spinal fluid leak?  Are you on long-term aspirin therapy?   Yes   Do you have cancer, leukemia, HIV/AIDS, or any other immune system problem?   No   Do you have a parent, brother, or sister with an immune system problem?   Yes   In the past 3 months, have you taken medications that affect  your immune system, such as prednisone, other steroids, or anticancer drugs; drugs for the treatment of rheumatoid arthritis, Crohn s disease, or psoriasis; or have you had radiation treatments?   Yes   Have you had a seizure, or a brain or other nervous system problem?   No   During the past year, have you received a transfusion of blood or blood    products, or been given immune (gamma) globulin or antiviral drug?   No   For women: Are you pregnant or is there a chance you could become       pregnant during the next month?   No   Have you received any vaccinations in the past 4 weeks?   No     Immunization questionnaire was positive for at least one answer.  Notified provider.      Patient instructed to remain in clinic for 15 minutes afterwards, and to report any adverse reactions.     Screening performed by Robert Cormier MA on 3/25/2025 at 10:05 AM.

## 2025-03-25 NOTE — ASSESSMENT & PLAN NOTE
Follows with neurology, symptoms worse over the last months/year, on prednisone again.  Chief presenting symptoms when things worsen include eye fluttering and needing to clear her throat due to swallow dysfunction and occasionally bolus of food getting stuck in the back of the throat.

## 2025-03-25 NOTE — ASSESSMENT & PLAN NOTE
Colonoscopy done in October, 3-year follow-up.  Discussed vaccines, just due now for tetanus and on prednisone probably will be for the next year or so.  Will hold off on tetanus on this basis.  Otherwise up-to-date vaccines.

## 2025-03-25 NOTE — PROGRESS NOTES
Adult Male Physical  A/P  Healthcare maintenance  Colonoscopy done in October, 3-year follow-up.  Discussed vaccines, just due now for tetanus and on prednisone probably will be for the next year or so.  Will hold off on tetanus on this basis.  Otherwise up-to-date vaccines.    Hyperlipidemia  Atorvastatin 10, check lipid    Malignant neoplasm of pharynx (H)  Patient reports that he is his final CT coming up for surveillance, assuming it is normal.  Cancer originally diagnosed in 2019    Myasthenia gravis (H)  Follows with neurology, symptoms worse over the last months/year, on prednisone again.  Chief presenting symptoms when things worsen include eye fluttering and needing to clear her throat due to swallow dysfunction and occasionally bolus of food getting stuck in the back of the throat.      Type 2 diabetes mellitus (H)  A1c back up to 6.5.  On metformin alone, thousand twice daily, previously on glipizide but stopped because of low blood sugars.  On prednisone, will be for the next year    Recommend more close surveillance, check A1c 3-month  If trending up, consider additional medication, perhaps GLP-1 given weight  Up-to-date ophthalmology  Foot exam done today  On statin  Blood pressure control        Hypothyroidism, unspecified type  Check TSH    Erectile dysfunction, unspecified erectile dysfunction type  Nothing really works for this.  Will try Cialis 20 mg again    Adenomatous polyp of transverse colon  3-year follow-up, 2027         HPI  Current Concerns/ Questions  59-year-old, here for physical exam.  Patient with history of myasthenia gravis, pharyngeal cancer, type 2 diabetes,  Last visit was 1/23/2024 for physical.  Had plan to do colonoscopy at that time which was done, A1c was well-controlled on metformin alone, 6.3, and atorvastatin, TSH H was checked, he had some leg cramps, KELLIE was normal, family history of coronary disease, decided to not pursue any diet not sticks because he was already  on high-dose atorvastatin and not having symptoms.  Colonoscopy done 6/27/2024 showed adenomas, recommend repeat in 2027.  Patient is due for tetanus booster.  A1c today is 6.5 generally,      PFSH:  Current medications reviewed as follows:  Current Outpatient Medications   Medication Sig Dispense Refill    alendronate (FOSAMAX) 35 MG tablet TAKE 1 TABLET(35 MG) BY MOUTH EVERY 7 DAYS 12 tablet 1    atorvastatin (LIPITOR) 10 MG tablet TAKE 1 TABLET(10 MG) BY MOUTH AT BEDTIME 90 tablet 3    gabapentin (NEURONTIN) 300 MG capsule TAKE 2 CAPSULES BY MOUTH THREE TIMES DAILY 180 capsule 3    ibuprofen (ADVIL/MOTRIN) 200 MG tablet Take 200 mg by mouth every 6 hours as needed      levothyroxine (SYNTHROID/LEVOTHROID) 100 MCG tablet TAKE 1 TABLET(100 MCG) BY MOUTH DAILY 90 tablet 0    magnesium oxide (MAG-OX) 400 MG tablet Take 1 tablet (400 mg) by mouth nightly as needed 90 tablet 3    metFORMIN (GLUCOPHAGE) 500 MG tablet TAKE 2 TABLETS(1000 MG) BY MOUTH TWICE DAILY WITH MEALS 360 tablet 0    omeprazole (PRILOSEC) 20 MG DR capsule Take 20 mg by mouth daily      predniSONE (DELTASONE) 10 MG tablet Take 2 tablets (20 mg) by mouth daily. 60 tablet 2    predniSONE (DELTASONE) 10 MG tablet Take 1 tablet (10 mg) by mouth daily. 90 tablet 2    predniSONE (DELTASONE) 5 MG tablet 3 tablets every other day for 1 month, then 2 tablets every other day (Patient taking differently: 2 tablets every other day- tapering off) 75 tablet 1    sildenafil (VIAGRA) 100 MG tablet Take 1 tablet (100 mg) by mouth daily as needed 10 tablet 11    tadalafil (ADCIRCA/CIALIS) 20 MG tablet Take 1 tablet (20 mg) by mouth every 24 hours 6 tablet 1     No current facility-administered medications for this visit.      Patient Active Problem List   Diagnosis    Essential tremor    Healthcare maintenance    Hyperlipidemia    Lymphadenopathy    Malignant neoplasm of pharynx (H)    Myasthenia gravis (H)    Obesity    Physiological tremor    Rash and nonspecific  skin eruption    Type 2 diabetes mellitus (H)    Drug-induced polyneuropathy    Hypothyroidism, unspecified type    Neuropathy    Exercise-induced leg cramps    Erectile dysfunction, unspecified erectile dysfunction type    Family history of coronary artery disease    Adenomatous polyp of transverse colon     Past Medical History:   Diagnosis Date    Cancer (H)     Diabetes (H)     Diabetes mellitus (H)     Hyperlipidemia     MG (myasthenia gravis) (H)     Myasthenia gravis (H)      Past Surgical History:   Procedure Laterality Date    G TUBE REPLACEMENT  2/7/2020    IR CHEST PORT PLACEMENT > 5 YRS OF AGE  12/23/2019    IR GASTROSTOMY TUBE CHANGE  2/7/2020    IR GASTROSTOMY TUBE INSERTION  1/24/2020    IR GASTROSTOMY TUBE PERCUTANEOUS PLCMNT  1/24/2020    IR PORT PLACEMENT >5 YEARS  12/23/2019    IR PORT REMOVAL  8/17/2020    IR PORT REMOVAL RIGHT  8/17/2020    IR SITE CHECK/EVALUATION  2/7/2020    IR SITE CHECK/EVALUATION  7/9/2020    IR TUBE REMOVAL  6/26/2020    IR TUBE REMOVAL  6/26/2020    TONSILLECTOMY & ADENOIDECTOMY  aged 7    WISDOM TOOTH EXTRACTION  age 16     Family History   Problem Relation Age of Onset    Dementia Mother     Hypertension Mother     Tremor Mother     Cancer Father     Diabetes Father     Myocardial Infarction Father     Hyperlipidemia Father     Sleep Apnea Father      History   Smoking Status    Never   Smokeless Tobacco    Never       Social History     Social History Narrative    Not on file     Immunization History   Administered Date(s) Administered    COVID-19 12+ (Pfizer) 11/01/2023, 10/18/2024    COVID-19 Bivalent 18+ (Moderna) 01/24/2023    COVID-19 Monovalent 18+ (Moderna) 02/25/2021, 03/25/2021, 11/07/2021, 04/29/2022    Influenza Vaccine 18-64 (Flublok) 09/02/2020    Influenza Vaccine >6 months,quad, PF 11/07/2021, 11/01/2023    Influenza Vaccine Trivalent (FluBlok) 10/18/2024    Pneumococcal 20 valent Conjugate (Prevnar 20) 01/24/2023    Pneumococcal 23 valent 01/11/2022     "TDAP (Adacel,Boostrix) 12/22/2014    Zoster recombinant adjuvanted (Shingrix) 01/11/2022, 06/20/2022     Body mass index is 32.36 kg/m .        Objective  Physical Exam  Vitals:    03/25/25 1003   BP: 122/77   BP Location: Left arm   Patient Position: Sitting   Cuff Size: Adult Large   Pulse: 64   Resp: 14   Temp: 97.2  F (36.2  C)   TempSrc: Temporal   SpO2: 100%   Weight: 98 kg (216 lb)   Height: 1.74 m (5' 8.5\")       Gen- alert and oriented x3, no acute distress  HEENT- Normocephalic atraumatic   pupils equal and reactive, EOMI.    TMs visualized and normal, ear canals normal.    Mouth moist with normal mucosa no ulceration, dentition normal or in good repair.  Neck- supple, no adenopathy or thyromegaly  Chest- Normal chest wall apperance, normal inspiration and expiration.  Clear to asculation.  CV- Regular rate and rhythm, normal tones, no murmus, gallops or rubs.  Abd-  Soft, nodistended, nontender.  Normal bowel sounds, no mass or organ enlargement.  Genitalia-not done  FRANTZ: was not done  Ext- Atraumatic,  No synovial thickening. Good perfusion, no edema. Periph pulses detected  Skin- warm and dry, no rash  Neuro- Cranial nerves grossly intact.  Normal gait, normal strength.  Coordination intact.    Diagnostics  Results for orders placed or performed in visit on 03/25/25   Hemoglobin A1c     Status: Abnormal   Result Value Ref Range    Estimated Average Glucose 140 (H) <117 mg/dL    Hemoglobin A1C 6.5 (H) 0.0 - 5.6 %                     "

## 2025-03-25 NOTE — PROGRESS NOTES
Preventive Care Visit  [facility]  Israel Quiroz MD, Family Medicine  Mar 25, 2025  {Provider  Link to SmartSet :831759}    {PROVIDER CHARTING PREFERENCE:080102}    Subjective   Albert is a 59 year old, presenting for the following:  Physical        3/25/2025    10:03 AM   Additional Questions   Roomed by hser   Accompanied by self   {ROOMER positive Fall Risk- Gait Speed Test is required click here to document the Gait Speed Test and then refresh the note to pull in results  :852275}      HPI  ***   {MA/LPN/RN Pre-Provider Visit Orders- hCG/UA/Strep (Optional):323916}  {SUPERLIST (Optional):133550}  {additonal problems for provider to add (Optional):582360}  Advance Care Planning  Patient does not have a Health Care Directive: {ADVANCE_DIRECTIVE_STATUS:228750}      3/20/2025   General Health   How would you rate your overall physical health? Good   Feel stress (tense, anxious, or unable to sleep) Only a little   (!) STRESS CONCERN      3/20/2025   Nutrition   Three or more servings of calcium each day? Yes   Diet: Regular (no restrictions)   How many servings of fruit and vegetables per day? (!) 2-3   How many sweetened beverages each day? 0-1         3/20/2025   Exercise   Days per week of moderate/strenous exercise 3 days   Average minutes spent exercising at this level 30 min         3/20/2025   Social Factors   Frequency of gathering with friends or relatives Once a week   Worry food won't last until get money to buy more No   Food not last or not have enough money for food? No   Do you have housing? (Housing is defined as stable permanent housing and does not include staying ouside in a car, in a tent, in an abandoned building, in an overnight shelter, or couch-surfing.) Yes   Are you worried about losing your housing? No   Lack of transportation? No   Unable to get utilities (heat,electricity)? No         3/20/2025   Fall Risk   Fallen 2 or more times in the past year? Yes   Trouble  "with walking or balance? No     {Positive Fall Risk- Gait Speed Test is required and was not documented before note was started.  If results do not appear, ask staff to complete.  Once completed, refresh note to pull in results Click here to link Gait Speed Test  :037054}      3/20/2025   Dental   Dentist two times every year? (!) NO           Today's PHQ-2 Score:       3/24/2025    12:06 PM   PHQ-2 ( 1999 Pfizer)   Q1: Little interest or pleasure in doing things 0   Q2: Feeling down, depressed or hopeless 0   PHQ-2 Score 0    Q1: Little interest or pleasure in doing things Not at all   Q2: Feeling down, depressed or hopeless Not at all   PHQ-2 Score 0       Patient-reported           3/20/2025   Substance Use   Alcohol more than 3/day or more than 7/wk No   Do you use any other substances recreationally? No     Social History     Tobacco Use    Smoking status: Never     Passive exposure: Past    Smokeless tobacco: Never    Tobacco comments:     no passive exposure   Vaping Use    Vaping status: Never Used   Substance Use Topics    Alcohol use: Not Currently     Comment: rare    Drug use: Never     {Provider  If there are gaps in the social history shown above, please follow the link to update and then refresh the note Link to Social and Substance History :634842}      3/20/2025   STI Screening   New sexual partner(s) since last STI/HIV test? No   Last PSA: No results found for: \"PSA\"  ASCVD Risk   The ASCVD Risk score (Ej CASEY, et al., 2019) failed to calculate for the following reasons:    The valid total cholesterol range is 130 to 320 mg/dL    {Link to Fracture Risk Assessment Tool (Optional):191838}    {Provider  REQUIRED FOR AWV Use the storyboard to review patient history, after sections have been marked as reviewed, refresh note to capture documentation:578653}   Reviewed and updated as needed this visit by Provider                    {HISTORY OPTIONS (Optional):263837}    {ROS Picklists " "(Optional):684020}     Objective    Exam  /77 (BP Location: Left arm, Patient Position: Sitting, Cuff Size: Adult Large)   Pulse 64   Temp 97.2  F (36.2  C) (Temporal)   Resp 14   Ht 1.74 m (5' 8.5\")   Wt 98 kg (216 lb)   SpO2 100%   BMI 32.36 kg/m     Estimated body mass index is 32.36 kg/m  as calculated from the following:    Height as of this encounter: 1.74 m (5' 8.5\").    Weight as of this encounter: 98 kg (216 lb).    Physical Exam  {Exam Choices (Optional):036974}        Signed Electronically by: Israel Quiroz MD  {Email feedback regarding this note to [email or web address]   :968529}  "

## 2025-03-25 NOTE — ASSESSMENT & PLAN NOTE
Patient reports that he is his final CT coming up for surveillance, assuming it is normal.  Cancer originally diagnosed in 2019

## 2025-03-26 ENCOUNTER — MYC MEDICAL ADVICE (OUTPATIENT)
Dept: FAMILY MEDICINE | Facility: CLINIC | Age: 60
End: 2025-03-26
Payer: COMMERCIAL

## 2025-03-26 RX ORDER — LEVOTHYROXINE SODIUM 112 UG/1
112 TABLET ORAL
Qty: 90 TABLET | Refills: 2 | Status: SHIPPED | OUTPATIENT
Start: 2025-03-26

## 2025-03-26 NOTE — RESULT ENCOUNTER NOTE
Albert, thyroid test was a little high, suggesting slightly low thyroid.  Lets bump up your thyroid dose 1 notch and recheck in 6 weeks.  You make a lab appointment, I put in the order.

## 2025-04-13 ENCOUNTER — HEALTH MAINTENANCE LETTER (OUTPATIENT)
Age: 60
End: 2025-04-13

## 2025-05-11 DIAGNOSIS — E11.9 TYPE 2 DIABETES MELLITUS WITHOUT COMPLICATION, WITHOUT LONG-TERM CURRENT USE OF INSULIN (H): ICD-10-CM

## 2025-06-04 ASSESSMENT — ACTIVITIES OF DAILY LIVING (ADL)
EYELID DROOP: PRESENT, BUT NOT DAILY
IMPAIRMENT OF ABILITY TO RISE FROM CHAIR: NORMAL
TALKING: INTERMITTENT SLURRING OR NASAL SPEECH
BREATHING: NORMAL
MYC_MG-ADL-TOTAL_SCORE: 0
SWALLOWING: NORMAL
IMPAIRMENT OF ABILITY TO BRUSH TEETH/COMB HAIR: NORMAL
CHEWING: NORMAL

## 2025-06-09 ENCOUNTER — OFFICE VISIT (OUTPATIENT)
Dept: NEUROLOGY | Facility: CLINIC | Age: 60
End: 2025-06-09
Payer: COMMERCIAL

## 2025-06-09 VITALS — SYSTOLIC BLOOD PRESSURE: 134 MMHG | DIASTOLIC BLOOD PRESSURE: 73 MMHG | OXYGEN SATURATION: 96 % | HEART RATE: 80 BPM

## 2025-06-09 DIAGNOSIS — G70.01 MYASTHENIA GRAVIS WITH EXACERBATION (H): ICD-10-CM

## 2025-06-09 DIAGNOSIS — Z79.60 LONG TERM CURRENT USE OF IMMUNOSUPPRESSIVE DRUG: ICD-10-CM

## 2025-06-09 DIAGNOSIS — Z29.89 NEED FOR PNEUMOCYSTIS PROPHYLAXIS: ICD-10-CM

## 2025-06-09 DIAGNOSIS — T38.0X5A STEROID-INDUCED OSTEOPENIA: Primary | ICD-10-CM

## 2025-06-09 DIAGNOSIS — M85.80 STEROID-INDUCED OSTEOPENIA: Primary | ICD-10-CM

## 2025-06-09 PROCEDURE — 1126F AMNT PAIN NOTED NONE PRSNT: CPT | Performed by: PSYCHIATRY & NEUROLOGY

## 2025-06-09 PROCEDURE — 3078F DIAST BP <80 MM HG: CPT | Performed by: PSYCHIATRY & NEUROLOGY

## 2025-06-09 PROCEDURE — 3075F SYST BP GE 130 - 139MM HG: CPT | Performed by: PSYCHIATRY & NEUROLOGY

## 2025-06-09 RX ORDER — SULFAMETHOXAZOLE AND TRIMETHOPRIM 800; 160 MG/1; MG/1
1 TABLET ORAL
Qty: 12 TABLET | Refills: 3 | Status: SHIPPED | OUTPATIENT
Start: 2025-06-09

## 2025-06-09 RX ORDER — PREDNISONE 10 MG/1
TABLET ORAL
Qty: 90 TABLET | Refills: 3 | Status: SHIPPED | OUTPATIENT
Start: 2025-06-09

## 2025-06-09 ASSESSMENT — PAIN SCALES - GENERAL: PAINLEVEL_OUTOF10: NO PAIN (0)

## 2025-06-09 NOTE — NURSING NOTE
Chief Complaint   Patient presents with    RECHECK     Return Myasthenia Gravis        /73 (BP Location: Right arm, Patient Position: Sitting, Cuff Size: Adult Regular)   Pulse 80   SpO2 96%     Gina Torres

## 2025-06-09 NOTE — LETTER
6/9/2025       RE: Cj Morales  [address] [place] [address]     Dear Colleague,    Thank you for referring your patient, Cj Morales, to the [facility] [place] at [facility]. Please see a copy of my visit note below.    Israel Quiroz MD  [place], June 9, 2025     Dear Dr. Quiroz,     I had the pleasure to see Mr. Morales in follow-up at the [facility]/neuromuscular clinic for his acetylcholine receptor antibody positive myasthenia.  In the past he had predominantly ocular symptoms that were very well-controlled with a very low dose of prednisone of 10 mg every other day.  Because of prolonged remission and his concerns about his borderline hemoglobin A1c in 2024 (6.3%), we decided to gradually stop the prednisone on February 2024.  At that time his MG ADL score was 0.  In the past he was given pyridostigmine but he did not like the drug and he stopped it, and in fact felt better without it.  He reminded me that several years ago he took azathioprine through the office of my colleague Dr. Stephens, but this drug was stopped when he was diagnosed with head and neck cancer and underwent radiation therapy in 2019.     After stopping the prednisone, some of his myasthenia symptoms returned in the second half of year 2024, and he was complaining of occasional eyelid fluttering/twitching, daily right-sided ptois, frequent episodes of throat clearing or tension of his throat, and occasional dysphagia and slurred speech a couple times a month.  This report behooved me to restart his prednisone after the last visit in February 2025.  He is currently on 20 mg daily.  He reports improvement of his dysphagia which has largely resolved. He can eat any food he wants.  He still however gets some left eyelid ptosis that is bothersome.  It is occurring less frequently compared to 4 months ago, but it still  happens on some days (1 point on MG ADL score).  He denies dysarthria/slurred speech, although he does not feel that his voice is quite back to normal yet.  He denies dyspnea on exertion, or chewing fatigue.  Denies diplopia, or fatigue of his arms when brushing his teeth or combing his hair.  He also has no difficulties getting up from a chair (does not need to use his arms).      MG ADL total score is now 1 for ptosis, significantly improved from prior when it was 4.     He had a DEXA scan in May 2024 showing improvement of bone density compared to the previous scan from 2023 that showed mild osteopenia.  He is still taking alendronate 35 mg weekly.     He had labs at his PCP office in March 25, 2025.  Hemoglobin A1c was 6.5%; on January 2024 it was 6.3%.  Basic metabolic panel showed glucose of 129 mg per DL, and mildly elevated BUN at 23.6, otherwise unremarkable.  ALT was normal.  Lipid profile showed mildly elevated triglycerides at 161 mg per DL, otherwise normal.  TSH was mildly elevated at 4.81.    Current Outpatient Medications   Medication Sig Dispense Refill     alendronate (FOSAMAX) 35 MG tablet TAKE 1 TABLET(35 MG) BY MOUTH EVERY 7 DAYS 12 tablet 1     atorvastatin (LIPITOR) 10 MG tablet TAKE 1 TABLET(10 MG) BY MOUTH AT BEDTIME 90 tablet 3     gabapentin (NEURONTIN) 300 MG capsule TAKE 2 CAPSULES BY MOUTH THREE TIMES DAILY 180 capsule 3     ibuprofen (ADVIL/MOTRIN) 200 MG tablet Take 200 mg by mouth every 6 hours as needed       levothyroxine (SYNTHROID/LEVOTHROID) 112 MCG tablet Take 1 tablet (112 mcg) by mouth every morning (before breakfast). 90 tablet 2     magnesium oxide (MAG-OX) 400 MG tablet Take 1 tablet (400 mg) by mouth nightly as needed 90 tablet 3     metFORMIN (GLUCOPHAGE) 500 MG tablet TAKE 2 TABLETS(1000 MG) BY MOUTH TWICE DAILY WITH MEALS 360 tablet 0     omeprazole (PRILOSEC) 20 MG DR capsule Take 20 mg by mouth daily       predniSONE (DELTASONE) 10 MG tablet Take 2 tablets (20 mg) by  mouth daily. 60 tablet 2     tadalafil (ADCIRCA/CIALIS) 20 MG tablet Take 1 tablet (20 mg) by mouth every 24 hours. 6 tablet 1     No current facility-administered medications for this visit.        VITALS: /73 (BP Location: Right arm, Patient Position: Sitting, Cuff Size: Adult Regular)   Pulse 80   SpO2 96%        EXAM: He is awake, alert, oriented x 3.  Eye some mild bilateral eyelid ptosis with sustained upward gaze, more so on the left.  There was minimal weakness of the orbicularis oculi on both sides.    IMPRESSION: Mr Sifuentes's MG symptoms have improved after starting prednisone and he is currently at 20 mg daily.  However he is not in complete remission yet, and the left eye continues to bother him (ptosis), plus he is experiencing episodes of throat clearing, congestion and dysphonia (which are not captured on the MG ADL score!).  For that reason, I proposed a more aggressive treatment strategy.  We will increase the prednisone to 30 mg daily now and he should give me an update by Nesha in 2 to 3 weeks.  If he is better controlled I would stay on that dose and then very slowly taper.  If not, I would consider pushing up to 40 mg daily.  I generally prescribe pneumocystis prophylaxis with Bactrim for prednisone maintenance dose over 20 mg daily especially when we consider a second immunosuppressant (below).  I will order this.  I am ordering a repeat bone density scan to reevaluate for osteoporosis.  Hemoglobin A1c is closely monitored by his primary care doctor.  He should also have an annual eye exam to evaluate for cataracts and glaucoma, watch the salt in his diet, and exercise regularly.    Given the requirement for more than 20 mg of prednisone daily to control his symptoms, I think he should restart a nonsteroid immunosuppressant drug.  I need permission from [facility] to prescribe him azathioprine, as he had head and neck cancer diagnosed in 2019.  It is 6 years after the  diagnosis and treatment without relapse, which is reassuring and I think it is okay to prescribe this drug, but I would appreciate oncology's permission first.  If we end up restarting it, the patient understands that he needs to very closely monitor his CBC, AST, and ALT (every 2 weeks for the first month, then monthly thereafter).  Note that he will be on a trip out of town from July 13 July 30.    He will return to our clinic for follow-up in 4 months, sooner if needed.  He agrees with the above plan.     Sincerely,     Eliud Page MD, FAAN     Billing is MDM level 4 (moderate) based on: #1 problems assessed: 1 chronic disorder with progression, exacerbation, or side effects of treatment (myasthenia gravis), and #2 Risk: Prescription drug management.     The longitudinal plan of care for the condition(s) below were addressed during this visit. Due to the added complexity in care, I will continue to support Albert in the subsequent management of this condition(s) and with the ongoing continuity of care of this condition(s): myasthenia gravis.     Again, thank you for allowing me to participate in the care of your patient.      Sincerely,    Eliud Page MD

## 2025-06-09 NOTE — PATIENT INSTRUCTIONS
Lets increase your prednisone dose to 30 mg daily now.    I would like you to contact us by MyChart in 2 to 3 weeks to let me know how your left eye is doing and whether your throat clearing and voice issues have improved or not.  That way, I can determine if you can stay on this dose or if you need even more prednisone.    I would like you to restart the azathioprine but I need to get permission from [facility] first to prescribe this drug.  If we end up prescribing the azathioprine, you will need to have blood draws every 2 weeks for the first month, then monthly thereafter.    Steroids (prednisone)  come with a very long list of side effects. Those include:    High blood pressure, weight gain, swelling, increase in blood sugars (diabetes), increased risk of infection, skin bruising, stomach upset, and occasionally bleed,  Osteoporosis (loss of bone density), cataracts, glaucoma (high eye pressure), mild weakness of the core leg muscles (difficulty rising from a chair), and trouble sleeping/mood changes.  To minimize those problems I recommend:    1) Take it all in the morning. Taking it in the evening can cause insomnia. If you still have hard time sleeping, consider taking over the counter melatonin 3 mg 2 hours before going to bed.  2) Take some type of over the counter medication for stomach protection (you are already on omeprazole).  3) Check with an eye doctor at least once a year for cataracts and glaucoma.  4) Try to exercise regularly, three times a week.  5) Take an antibiotic called Bactrim, three times a week, to minimize the risk of a serious infection called PCP pneumonia.  6) Take calcium and vitamin D daily, and I am ordering a repeat bone density scan to evaluate for osteoporosis.    7) Report any signs of infection (fever, chills, malaise, etc) to your primary care doctor promptly.  8) Check your blood pressure once a week  9) Avoid refined sugars in your diet, and Dr Quiroz will be  periodically checking a blood test called hemoglobin A1c to see how your diabetes is doing    Follow up 4 months

## 2025-06-09 NOTE — PROGRESS NOTES
Israel Quiroz MD  [place], June 9, 2025     Dear Dr. Quiroz,     I had the pleasure to see Mr. Morales in follow-up at the [facility]/neuromuscular clinic for his acetylcholine receptor antibody positive myasthenia.  In the past he had predominantly ocular symptoms that were very well-controlled with a very low dose of prednisone of 10 mg every other day.  Because of prolonged remission and his concerns about his borderline hemoglobin A1c in 2024 (6.3%), we decided to gradually stop the prednisone on February 2024.  At that time his MG ADL score was 0.  In the past he was given pyridostigmine but he did not like the drug and he stopped it, and in fact felt better without it.  He reminded me that several years ago he took azathioprine through the office of my colleague Dr. Stephens, but this drug was stopped when he was diagnosed with head and neck cancer and underwent radiation therapy in 2019.     After stopping the prednisone, some of his myasthenia symptoms returned in the second half of year 2024, and he was complaining of occasional eyelid fluttering/twitching, daily right-sided ptois, frequent episodes of throat clearing or tension of his throat, and occasional dysphagia and slurred speech a couple times a month.  This report behooved me to restart his prednisone after the last visit in February 2025.  He is currently on 20 mg daily.  He reports improvement of his dysphagia which has largely resolved. He can eat any food he wants.  He still however gets some left eyelid ptosis that is bothersome.  It is occurring less frequently compared to 4 months ago, but it still happens on some days (1 point on MG ADL score).  He denies dysarthria/slurred speech, although he does not feel that his voice is quite back to normal yet.  He denies dyspnea on exertion, or chewing fatigue.  Denies diplopia, or fatigue of his arms when brushing his teeth or combing his hair.  He also has no difficulties  getting up from a chair (does not need to use his arms).      MG ADL total score is now 1 for ptosis, significantly improved from prior when it was 4.     He had a DEXA scan in May 2024 showing improvement of bone density compared to the previous scan from 2023 that showed mild osteopenia.  He is still taking alendronate 35 mg weekly.     He had labs at his PCP office in March 25, 2025.  Hemoglobin A1c was 6.5%; on January 2024 it was 6.3%.  Basic metabolic panel showed glucose of 129 mg per DL, and mildly elevated BUN at 23.6, otherwise unremarkable.  ALT was normal.  Lipid profile showed mildly elevated triglycerides at 161 mg per DL, otherwise normal.  TSH was mildly elevated at 4.81.    Current Outpatient Medications   Medication Sig Dispense Refill    alendronate (FOSAMAX) 35 MG tablet TAKE 1 TABLET(35 MG) BY MOUTH EVERY 7 DAYS 12 tablet 1    atorvastatin (LIPITOR) 10 MG tablet TAKE 1 TABLET(10 MG) BY MOUTH AT BEDTIME 90 tablet 3    gabapentin (NEURONTIN) 300 MG capsule TAKE 2 CAPSULES BY MOUTH THREE TIMES DAILY 180 capsule 3    ibuprofen (ADVIL/MOTRIN) 200 MG tablet Take 200 mg by mouth every 6 hours as needed      levothyroxine (SYNTHROID/LEVOTHROID) 112 MCG tablet Take 1 tablet (112 mcg) by mouth every morning (before breakfast). 90 tablet 2    magnesium oxide (MAG-OX) 400 MG tablet Take 1 tablet (400 mg) by mouth nightly as needed 90 tablet 3    metFORMIN (GLUCOPHAGE) 500 MG tablet TAKE 2 TABLETS(1000 MG) BY MOUTH TWICE DAILY WITH MEALS 360 tablet 0    omeprazole (PRILOSEC) 20 MG DR capsule Take 20 mg by mouth daily      predniSONE (DELTASONE) 10 MG tablet Take 2 tablets (20 mg) by mouth daily. 60 tablet 2    tadalafil (ADCIRCA/CIALIS) 20 MG tablet Take 1 tablet (20 mg) by mouth every 24 hours. 6 tablet 1     No current facility-administered medications for this visit.        VITALS: /73 (BP Location: Right arm, Patient Position: Sitting, Cuff Size: Adult Regular)   Pulse 80   SpO2 96%        EXAM:  He is awake, alert, oriented x 3.  Eye some mild bilateral eyelid ptosis with sustained upward gaze, more so on the left.  There was minimal weakness of the orbicularis oculi on both sides.    IMPRESSION: Mr Sifuentes's MG symptoms have improved after starting prednisone and he is currently at 20 mg daily.  However he is not in complete remission yet, and the left eye continues to bother him (ptosis), plus he is experiencing episodes of throat clearing, congestion and dysphonia (which are not captured on the MG ADL score!).  For that reason, I proposed a more aggressive treatment strategy.  We will increase the prednisone to 30 mg daily now and he should give me an update by Nesha in 2 to 3 weeks.  If he is better controlled I would stay on that dose and then very slowly taper.  If not, I would consider pushing up to 40 mg daily.  I generally prescribe pneumocystis prophylaxis with Bactrim for prednisone maintenance dose over 20 mg daily especially when we consider a second immunosuppressant (below).  I will order this.  I am ordering a repeat bone density scan to reevaluate for osteoporosis.  Hemoglobin A1c is closely monitored by his primary care doctor.  He should also have an annual eye exam to evaluate for cataracts and glaucoma, watch the salt in his diet, and exercise regularly.    Given the requirement for more than 20 mg of prednisone daily to control his symptoms, I think he should restart a nonsteroid immunosuppressant drug.  I need permission from [facility] to prescribe him azathioprine, as he had head and neck cancer diagnosed in 2019.  It is 6 years after the diagnosis and treatment without relapse, which is reassuring and I think it is okay to prescribe this drug, but I would appreciate oncology's permission first.  If we end up restarting it, the patient understands that he needs to very closely monitor his CBC, AST, and ALT (every 2 weeks for the first month, then monthly thereafter).   Note that he will be on a trip out of town from July 13 July 30.    He will return to our clinic for follow-up in 4 months, sooner if needed.  He agrees with the above plan.     Sincerely,     Eliud Page MD, FAAN     Billing is MDM level 4 (moderate) based on: #1 problems assessed: 1 chronic disorder with progression, exacerbation, or side effects of treatment (myasthenia gravis), and #2 Risk: Prescription drug management.     The longitudinal plan of care for the condition(s) below were addressed during this visit. Due to the added complexity in care, I will continue to support Albert in the subsequent management of this condition(s) and with the ongoing continuity of care of this condition(s): myasthenia gravis.

## 2025-06-12 RX ORDER — AZATHIOPRINE 50 MG/1
TABLET ORAL
Qty: 90 TABLET | Refills: 2 | Status: SHIPPED | OUTPATIENT
Start: 2025-06-12

## 2025-06-30 ENCOUNTER — RESULTS FOLLOW-UP (OUTPATIENT)
Dept: FAMILY MEDICINE | Facility: CLINIC | Age: 60
End: 2025-06-30

## 2025-06-30 ENCOUNTER — LAB (OUTPATIENT)
Dept: LAB | Facility: CLINIC | Age: 60
End: 2025-06-30
Payer: COMMERCIAL

## 2025-06-30 DIAGNOSIS — Z79.60 LONG TERM CURRENT USE OF IMMUNOSUPPRESSIVE DRUG: ICD-10-CM

## 2025-06-30 DIAGNOSIS — E03.9 HYPOTHYROIDISM, UNSPECIFIED TYPE: ICD-10-CM

## 2025-06-30 DIAGNOSIS — E11.9 TYPE 2 DIABETES MELLITUS WITHOUT COMPLICATION, WITHOUT LONG-TERM CURRENT USE OF INSULIN (H): ICD-10-CM

## 2025-06-30 DIAGNOSIS — G70.01 MYASTHENIA GRAVIS WITH EXACERBATION (H): ICD-10-CM

## 2025-06-30 DIAGNOSIS — R25.2 CRAMPS, MUSCLE, GENERAL: ICD-10-CM

## 2025-06-30 LAB
ALT SERPL W P-5'-P-CCNC: 30 U/L (ref 0–70)
AST SERPL W P-5'-P-CCNC: 19 U/L (ref 0–45)
BASOPHILS # BLD MANUAL: 0 10E3/UL (ref 0–0.2)
BASOPHILS NFR BLD MANUAL: 0 %
EOSINOPHIL # BLD MANUAL: 0.2 10E3/UL (ref 0–0.7)
EOSINOPHIL NFR BLD MANUAL: 2 %
ERYTHROCYTE [DISTWIDTH] IN BLOOD BY AUTOMATED COUNT: 13.2 % (ref 10–15)
EST. AVERAGE GLUCOSE BLD GHB EST-MCNC: 200 MG/DL
HBA1C MFR BLD: 8.6 % (ref 0–5.6)
HCT VFR BLD AUTO: 42.8 % (ref 40–53)
HGB BLD-MCNC: 14.7 G/DL (ref 13.3–17.7)
LYMPHOCYTES # BLD MANUAL: 1.2 10E3/UL (ref 0.8–5.3)
LYMPHOCYTES NFR BLD MANUAL: 11 %
MCH RBC QN AUTO: 31.2 PG (ref 26.5–33)
MCHC RBC AUTO-ENTMCNC: 34.3 G/DL (ref 31.5–36.5)
MCV RBC AUTO: 91 FL (ref 78–100)
METAMYELOCYTES # BLD MANUAL: 0.3 10E3/UL
METAMYELOCYTES NFR BLD MANUAL: 3 %
MONOCYTES # BLD MANUAL: 0.6 10E3/UL (ref 0–1.3)
MONOCYTES NFR BLD MANUAL: 6 %
NEUTROPHILS # BLD MANUAL: 8.2 10E3/UL (ref 1.6–8.3)
NEUTROPHILS NFR BLD MANUAL: 78 %
PLAT MORPH BLD: NORMAL
PLATELET # BLD AUTO: 184 10E3/UL (ref 150–450)
RBC # BLD AUTO: 4.71 10E6/UL (ref 4.4–5.9)
RBC MORPH BLD: NORMAL
TSH SERPL DL<=0.005 MIU/L-ACNC: 2.05 UIU/ML (ref 0.3–4.2)
WBC # BLD AUTO: 10.5 10E3/UL (ref 4–11)

## 2025-06-30 PROCEDURE — 84443 ASSAY THYROID STIM HORMONE: CPT

## 2025-06-30 PROCEDURE — 83036 HEMOGLOBIN GLYCOSYLATED A1C: CPT

## 2025-07-01 ENCOUNTER — MYC MEDICAL ADVICE (OUTPATIENT)
Dept: FAMILY MEDICINE | Facility: CLINIC | Age: 60
End: 2025-07-01
Payer: COMMERCIAL

## 2025-07-01 LAB
ANION GAP SERPL CALCULATED.3IONS-SCNC: 16 MMOL/L (ref 7–15)
BUN SERPL-MCNC: 29.5 MG/DL (ref 8–23)
CALCIUM SERPL-MCNC: 9.9 MG/DL (ref 8.8–10.4)
CHLORIDE SERPL-SCNC: 101 MMOL/L (ref 98–107)
CREAT SERPL-MCNC: 0.96 MG/DL (ref 0.67–1.17)
EGFRCR SERPLBLD CKD-EPI 2021: 90 ML/MIN/1.73M2
GLUCOSE SERPL-MCNC: 159 MG/DL (ref 70–99)
HCO3 SERPL-SCNC: 23 MMOL/L (ref 22–29)
MAGNESIUM SERPL-MCNC: 2 MG/DL (ref 1.7–2.3)
PHOSPHATE SERPL-MCNC: 3.4 MG/DL (ref 2.5–4.5)
POTASSIUM SERPL-SCNC: 4.7 MMOL/L (ref 3.4–5.3)
SODIUM SERPL-SCNC: 140 MMOL/L (ref 135–145)

## 2025-07-02 NOTE — TELEPHONE ENCOUNTER
Please have pt come in for a visit to discuss med adjustment and review and blood sugar readings within the next week.  Myranda Cedillo PA-C on 7/2/2025 at 5:17 PM

## 2025-07-02 NOTE — TELEPHONE ENCOUNTER
DOD - See MyChart from patient, please advise on blood sugar control. Visit to discuss?    Per last visit with Krystamarshall on 3/25/25:  Type 2 diabetes mellitus (H)  A1c back up to 6.5.  On metformin alone, thousand twice daily, previously on glipizide but stopped because of low blood sugars.  On prednisone, will be for the next year     Recommend more close surveillance, check A1c 3-month  If trending up, consider additional medication, perhaps GLP-1 given weight  Up-to-date ophthalmology  Foot exam done today  On statin  Blood pressure control    Tatum Granados RN  [facility]

## 2025-07-09 ENCOUNTER — OFFICE VISIT (OUTPATIENT)
Dept: FAMILY MEDICINE | Facility: CLINIC | Age: 60
End: 2025-07-09
Payer: COMMERCIAL

## 2025-07-09 ENCOUNTER — MYC MEDICAL ADVICE (OUTPATIENT)
Dept: FAMILY MEDICINE | Facility: CLINIC | Age: 60
End: 2025-07-09

## 2025-07-09 VITALS
TEMPERATURE: 98.3 F | BODY MASS INDEX: 33.11 KG/M2 | DIASTOLIC BLOOD PRESSURE: 71 MMHG | HEART RATE: 85 BPM | RESPIRATION RATE: 16 BRPM | WEIGHT: 221 LBS | OXYGEN SATURATION: 97 % | SYSTOLIC BLOOD PRESSURE: 138 MMHG

## 2025-07-09 DIAGNOSIS — G70.00 MYASTHENIA GRAVIS (H): ICD-10-CM

## 2025-07-09 DIAGNOSIS — E11.9 TYPE 2 DIABETES MELLITUS WITHOUT COMPLICATION, WITHOUT LONG-TERM CURRENT USE OF INSULIN (H): Primary | ICD-10-CM

## 2025-07-09 PROCEDURE — G2211 COMPLEX E/M VISIT ADD ON: HCPCS | Performed by: PHYSICIAN ASSISTANT

## 2025-07-09 PROCEDURE — 3075F SYST BP GE 130 - 139MM HG: CPT | Performed by: PHYSICIAN ASSISTANT

## 2025-07-09 PROCEDURE — 3078F DIAST BP <80 MM HG: CPT | Performed by: PHYSICIAN ASSISTANT

## 2025-07-09 PROCEDURE — 99214 OFFICE O/P EST MOD 30 MIN: CPT | Performed by: PHYSICIAN ASSISTANT

## 2025-07-09 RX ORDER — GLIPIZIDE 2.5 MG/1
2.5 TABLET, EXTENDED RELEASE ORAL DAILY
Qty: 90 TABLET | Refills: 1 | Status: SHIPPED | OUTPATIENT
Start: 2025-07-09

## 2025-07-09 NOTE — PROGRESS NOTES
"  Assessment & Plan     Type 2 diabetes mellitus without complication, without long-term current use of insulin (H)  -add back Glipizide XL, pt did well with this in the past and prefers this to any other medication at this time  -discussed importance of regular meals and when to hold Glipizide  -will check glucose 1-2 times daily, had not been monitoring often  -is weaning down on prednisone, will plan to monitor ongoing need for Glipizide.  He will follow-up with PCP in 1-2 months, sooner with concerns  - glipiZIDE (GLUCOTROL XL) 2.5 MG 24 hr tablet; Take 1 tablet (2.5 mg) by mouth daily.    Myasthenia gravis (H)  -follows with Neurology    BMI  Estimated body mass index is 33.11 kg/m  as calculated from the following:    Height as of 3/25/25: 1.74 m (5' 8.5\").    Weight as of this encounter: 100.2 kg (221 lb).           Subjective   Albert is a 60 year old, presenting for the following health issues:  Follow up diabetes      7/9/2025     1:05 PM   Additional Questions   Roomed by Chon SAUER   Accompanied by self     -h/o Myasthenia Gravis and is followed by Neurology.  Also has a h/o T2DM and has noticed blood sugars increasing with his increased prednisone dose  -is taking 30mg daily and working toward tapering soon  -h/o good control of blood sugars when off of the prednisone.  Taking only Metformin.  Took Glipizide in the past and tolerated this well.  Would like to go back on this if needed.      Lab Results       Component                Value               Date                       A1C                      8.6                 06/30/2025                 A1C                      6.5                 03/25/2025                 A1C                      6.3                 01/23/2024                 A1C                      6.0                 11/01/2023                 A1C                      6.2                 01/24/2023                 History of Present Illness       Diabetes:   He presents for follow up of " diabetes.    He is not checking blood glucose.        He is concerned about other.   He is having numbness in feet and weight gain.            He eats 0-1 servings of fruits and vegetables daily.He consumes 0 sweetened beverage(s) daily.He exercises with enough effort to increase his heart rate 20 to 29 minutes per day.  He exercises with enough effort to increase his heart rate 3 or less days per week.   He is taking medications regularly.            Objective    /71 (BP Location: Right arm, Patient Position: Sitting, Cuff Size: Adult Regular)   Pulse 85   Temp 98.3  F (36.8  C) (Oral)   Resp 16   Wt 100.2 kg (221 lb)   SpO2 97%   BMI 33.11 kg/m    Body mass index is 33.11 kg/m .  Physical Exam   GENERAL: alert and no distress  NECK: no adenopathy, no asymmetry, masses, or scars  RESP: lungs clear to auscultation - no rales, rhonchi or wheezes  CV: regular rate and rhythm, normal S1 S2, no S3 or S4, no murmur, click or rub, no peripheral edema  ABDOMEN: soft, nontender, no hepatosplenomegaly, no masses and bowel sounds normal  MS: no gross musculoskeletal defects noted, no edema          Signed Electronically by: Myranda Cedillo PA-C

## 2025-07-09 NOTE — PROGRESS NOTES
Prior to immunization administration, verified patients identity using patient s name and date of birth. Please see Immunization Activity for additional information.     Screening Questionnaire for Adult Immunization    Are you sick today?   No   Do you have allergies to medications, food, a vaccine component or latex?   Yes   Have you ever had a serious reaction after receiving a vaccination?   No   Do you have a long-term health problem with heart, lung, kidney, or metabolic disease (e.g., diabetes), asthma, a blood disorder, no spleen, complement component deficiency, a cochlear implant, or a spinal fluid leak?  Are you on long-term aspirin therapy?   Yes   Do you have cancer, leukemia, HIV/AIDS, or any other immune system problem?   Yes   Do you have a parent, brother, or sister with an immune system problem?   Yes   In the past 3 months, have you taken medications that affect  your immune system, such as prednisone, other steroids, or anticancer drugs; drugs for the treatment of rheumatoid arthritis, Crohn s disease, or psoriasis; or have you had radiation treatments?   Yes   Have you had a seizure, or a brain or other nervous system problem?   No   During the past year, have you received a transfusion of blood or blood    products, or been given immune (gamma) globulin or antiviral drug?   No   For women: Are you pregnant or is there a chance you could become       pregnant during the next month?   No   Have you received any vaccinations in the past 4 weeks?   No     Immunization questionnaire was positive for at least one answer.  Notified provider.      Patient instructed to remain in clinic for 15 minutes afterwards, and to report any adverse reactions.     Screening performed by Chon Velásquez MA on 7/9/2025 at 1:09 PM.        Answers submitted by the patient for this visit:  Diabetes Visit (Submitted on 7/4/2025)  Chief Complaint: Chronic problems general questions HPI Form  Frequency of checking blood  sugars:: not at all  Diabetic concerns:: other  Paraesthesia present:: numbness in feet, weight gain  General Questionnaire (Submitted on 7/4/2025)  Chief Complaint: Chronic problems general questions HPI Form  How many servings of fruits and vegetables do you eat daily?: 0-1  On average, how many sweetened beverages do you drink each day (Examples: soda, juice, sweet tea, etc.  Do NOT count diet or artificially sweetened beverages)?: 0  How many minutes a day do you exercise enough to make your heart beat faster?: 20 to 29  How many days a week do you exercise enough to make your heart beat faster?: 3 or less  How many days per week do you miss taking your medication?: 0  Questionnaire about: Chronic problems general questions HPI Form (Submitted on 7/4/2025)  Chief Complaint: Chronic problems general questions HPI Form

## 2025-07-11 PROBLEM — Z23 ENCOUNTER FOR IMMUNIZATION: Status: ACTIVE | Noted: 2025-07-11

## 2025-07-31 ENCOUNTER — LAB (OUTPATIENT)
Dept: LAB | Facility: CLINIC | Age: 60
End: 2025-07-31
Payer: COMMERCIAL

## 2025-07-31 DIAGNOSIS — G70.01 MYASTHENIA GRAVIS WITH EXACERBATION (H): ICD-10-CM

## 2025-07-31 DIAGNOSIS — Z79.60 LONG TERM CURRENT USE OF IMMUNOSUPPRESSIVE DRUG: ICD-10-CM

## 2025-07-31 LAB
ALT SERPL W P-5'-P-CCNC: 60 U/L (ref 0–70)
AST SERPL W P-5'-P-CCNC: 33 U/L (ref 0–45)
BASOPHILS # BLD MANUAL: 0 10E3/UL (ref 0–0.2)
BASOPHILS NFR BLD MANUAL: 0 %
EOSINOPHIL # BLD MANUAL: 0 10E3/UL (ref 0–0.7)
EOSINOPHIL NFR BLD MANUAL: 0 %
ERYTHROCYTE [DISTWIDTH] IN BLOOD BY AUTOMATED COUNT: 15 % (ref 10–15)
HCT VFR BLD AUTO: 41.6 % (ref 40–53)
HGB BLD-MCNC: 13.9 G/DL (ref 13.3–17.7)
LYMPHOCYTES # BLD MANUAL: 0.7 10E3/UL (ref 0.8–5.3)
LYMPHOCYTES NFR BLD MANUAL: 9 %
MCH RBC QN AUTO: 32.5 PG (ref 26.5–33)
MCHC RBC AUTO-ENTMCNC: 33.4 G/DL (ref 31.5–36.5)
MCV RBC AUTO: 97 FL (ref 78–100)
MONOCYTES # BLD MANUAL: 0.7 10E3/UL (ref 0–1.3)
MONOCYTES NFR BLD MANUAL: 9 %
NEUTROPHILS # BLD MANUAL: 6 10E3/UL (ref 1.6–8.3)
NEUTROPHILS NFR BLD MANUAL: 82 %
PLAT MORPH BLD: NORMAL
PLATELET # BLD AUTO: 209 10E3/UL (ref 150–450)
RBC # BLD AUTO: 4.28 10E6/UL (ref 4.4–5.9)
RBC MORPH BLD: NORMAL
WBC # BLD AUTO: 7.3 10E3/UL (ref 4–11)

## 2025-08-04 ENCOUNTER — ANCILLARY PROCEDURE (OUTPATIENT)
Dept: BONE DENSITY | Facility: CLINIC | Age: 60
End: 2025-08-04
Attending: PSYCHIATRY & NEUROLOGY
Payer: COMMERCIAL

## 2025-08-04 DIAGNOSIS — T38.0X5A STEROID-INDUCED OSTEOPENIA: ICD-10-CM

## 2025-08-04 DIAGNOSIS — M85.80 STEROID-INDUCED OSTEOPENIA: ICD-10-CM

## 2025-08-04 PROCEDURE — 77081 DXA BONE DENSITY APPENDICULR: CPT | Mod: TC | Performed by: RADIOLOGY

## 2025-08-04 PROCEDURE — 77080 DXA BONE DENSITY AXIAL: CPT | Mod: TC | Performed by: RADIOLOGY

## 2025-08-04 PROCEDURE — 77091 TBS TECHL CALCULATION ONLY: CPT | Performed by: RADIOLOGY

## 2025-08-11 DIAGNOSIS — E11.9 TYPE 2 DIABETES MELLITUS WITHOUT COMPLICATION, WITHOUT LONG-TERM CURRENT USE OF INSULIN (H): ICD-10-CM

## 2025-08-13 ENCOUNTER — MYC MEDICAL ADVICE (OUTPATIENT)
Dept: FAMILY MEDICINE | Facility: CLINIC | Age: 60
End: 2025-08-13
Payer: COMMERCIAL

## 2025-08-13 DIAGNOSIS — E11.9 TYPE 2 DIABETES MELLITUS WITHOUT COMPLICATION, WITHOUT LONG-TERM CURRENT USE OF INSULIN (H): Primary | ICD-10-CM

## 2025-08-14 ENCOUNTER — LAB (OUTPATIENT)
Dept: LAB | Facility: CLINIC | Age: 60
End: 2025-08-14
Payer: COMMERCIAL

## 2025-08-14 DIAGNOSIS — Z79.60 LONG TERM CURRENT USE OF IMMUNOSUPPRESSIVE DRUG: ICD-10-CM

## 2025-08-14 DIAGNOSIS — G70.01 MYASTHENIA GRAVIS WITH EXACERBATION (H): ICD-10-CM

## 2025-08-14 LAB
ALT SERPL W P-5'-P-CCNC: 97 U/L (ref 0–70)
AST SERPL W P-5'-P-CCNC: 57 U/L (ref 0–45)
BASOPHILS # BLD AUTO: <0.04 10E3/UL (ref 0–0.2)
BASOPHILS NFR BLD AUTO: 0.3 %
EOSINOPHIL # BLD AUTO: 0.1 10E3/UL (ref 0–0.7)
EOSINOPHIL NFR BLD AUTO: 1.5 %
ERYTHROCYTE [DISTWIDTH] IN BLOOD BY AUTOMATED COUNT: 14.9 % (ref 10–15)
HCT VFR BLD AUTO: 39.1 % (ref 40–53)
HGB BLD-MCNC: 13.2 G/DL (ref 13.3–17.7)
IMM GRANULOCYTES # BLD: 0.26 10E3/UL
IMM GRANULOCYTES NFR BLD: 4 %
LYMPHOCYTES # BLD AUTO: 0.79 10E3/UL (ref 0.8–5.3)
LYMPHOCYTES NFR BLD AUTO: 12.1 %
MCH RBC QN AUTO: 32.4 PG (ref 26.5–33)
MCHC RBC AUTO-ENTMCNC: 33.8 G/DL (ref 31.5–36.5)
MCV RBC AUTO: 95.8 FL (ref 78–100)
MONOCYTES # BLD AUTO: 0.35 10E3/UL (ref 0–1.3)
MONOCYTES NFR BLD AUTO: 5.3 %
NEUTROPHILS # BLD AUTO: 5.03 10E3/UL (ref 1.6–8.3)
NEUTROPHILS NFR BLD AUTO: 76.8 %
PLATELET # BLD AUTO: 178 10E3/UL (ref 150–450)
RBC # BLD AUTO: 4.08 10E6/UL (ref 4.4–5.9)
WBC # BLD AUTO: 6.55 10E3/UL (ref 4–11)

## 2025-08-20 DIAGNOSIS — G70.01 MYASTHENIA GRAVIS WITH EXACERBATION (H): ICD-10-CM

## 2025-08-20 RX ORDER — AZATHIOPRINE 50 MG/1
TABLET ORAL
Qty: 60 TABLET | Refills: 2 | Status: SHIPPED | OUTPATIENT
Start: 2025-08-20

## 2025-08-26 ENCOUNTER — TRANSFERRED RECORDS (OUTPATIENT)
Dept: HEALTH INFORMATION MANAGEMENT | Facility: CLINIC | Age: 60
End: 2025-08-26
Payer: COMMERCIAL

## 2025-08-27 ENCOUNTER — VIRTUAL VISIT (OUTPATIENT)
Dept: FAMILY MEDICINE | Facility: CLINIC | Age: 60
End: 2025-08-27
Payer: COMMERCIAL

## 2025-08-27 DIAGNOSIS — E11.9 TYPE 2 DIABETES MELLITUS WITHOUT COMPLICATION, WITHOUT LONG-TERM CURRENT USE OF INSULIN (H): Primary | ICD-10-CM

## 2025-08-27 DIAGNOSIS — R00.0 TACHYCARDIA: ICD-10-CM

## 2025-08-27 PROCEDURE — 98005 SYNCH AUDIO-VIDEO EST LOW 20: CPT | Performed by: FAMILY MEDICINE

## 2025-08-27 RX ORDER — GLIPIZIDE 10 MG/1
10 TABLET ORAL EVERY MORNING
Qty: 90 TABLET | Refills: 3 | Status: SHIPPED | OUTPATIENT
Start: 2025-08-27